# Patient Record
Sex: FEMALE | Race: WHITE | NOT HISPANIC OR LATINO | Employment: UNEMPLOYED | URBAN - METROPOLITAN AREA
[De-identification: names, ages, dates, MRNs, and addresses within clinical notes are randomized per-mention and may not be internally consistent; named-entity substitution may affect disease eponyms.]

---

## 2017-01-18 ENCOUNTER — LAB VISIT (OUTPATIENT)
Dept: LAB | Facility: HOSPITAL | Age: 32
End: 2017-01-18
Attending: INTERNAL MEDICINE
Payer: COMMERCIAL

## 2017-01-18 ENCOUNTER — OFFICE VISIT (OUTPATIENT)
Dept: INTERNAL MEDICINE | Facility: CLINIC | Age: 32
End: 2017-01-18
Payer: COMMERCIAL

## 2017-01-18 VITALS
SYSTOLIC BLOOD PRESSURE: 124 MMHG | OXYGEN SATURATION: 97 % | HEIGHT: 65 IN | BODY MASS INDEX: 41.22 KG/M2 | HEART RATE: 70 BPM | WEIGHT: 247.38 LBS | DIASTOLIC BLOOD PRESSURE: 78 MMHG

## 2017-01-18 DIAGNOSIS — F41.0 PANIC ATTACKS: Primary | ICD-10-CM

## 2017-01-18 DIAGNOSIS — R94.6 THYROID FUNCTION STUDY ABNORMALITY: ICD-10-CM

## 2017-01-18 DIAGNOSIS — F41.0 PANIC ATTACKS: ICD-10-CM

## 2017-01-18 DIAGNOSIS — O21.0: ICD-10-CM

## 2017-01-18 DIAGNOSIS — R73.03 BORDERLINE DIABETES: ICD-10-CM

## 2017-01-18 DIAGNOSIS — R63.2 BINGE EATING: ICD-10-CM

## 2017-01-18 LAB
T3 SERPL-MCNC: 108 NG/DL
T4 FREE SERPL-MCNC: 1.2 NG/DL
TSH SERPL DL<=0.005 MIU/L-ACNC: 0.48 UIU/ML

## 2017-01-18 PROCEDURE — 84480 ASSAY TRIIODOTHYRONINE (T3): CPT

## 2017-01-18 PROCEDURE — 84443 ASSAY THYROID STIM HORMONE: CPT

## 2017-01-18 PROCEDURE — 36415 COLL VENOUS BLD VENIPUNCTURE: CPT | Mod: PO

## 2017-01-18 PROCEDURE — 84439 ASSAY OF FREE THYROXINE: CPT

## 2017-01-18 PROCEDURE — 1159F MED LIST DOCD IN RCRD: CPT | Mod: S$GLB,,, | Performed by: INTERNAL MEDICINE

## 2017-01-18 PROCEDURE — 99203 OFFICE O/P NEW LOW 30 MIN: CPT | Mod: S$GLB,,, | Performed by: INTERNAL MEDICINE

## 2017-01-18 PROCEDURE — 99999 PR PBB SHADOW E&M-EST. PATIENT-LVL III: CPT | Mod: PBBFAC,,, | Performed by: INTERNAL MEDICINE

## 2017-01-18 RX ORDER — LORAZEPAM 1 MG/1
1 TABLET ORAL EVERY 12 HOURS PRN
Qty: 30 TABLET | Refills: 0 | Status: SHIPPED | OUTPATIENT
Start: 2017-01-18 | End: 2017-12-19 | Stop reason: ALTCHOICE

## 2017-01-19 NOTE — PROGRESS NOTES
Portions of this note are generated with voice recognition software. Typographical errors may exist.       Patient Name:MACHO CARR  Patient MRN:   [id number]    History of Present Illness   ================================================================  MACHO CARR is a 31 y.o. female here for primary care visit for  Chief Complaint   Patient presents with    Panic Attack       Past Medical History   Diagnosis Date    Hypertension      with pregnancy       Past Surgical History   Procedure Laterality Date    Cholecystectomy         Review of patient's allergies indicates:   Allergen Reactions    Ciprofloxacin Nausea And Vomiting    Sulfa (sulfonamide antibiotics) Hives       Current Outpatient Prescriptions on File Prior to Visit   Medication Sig Dispense Refill    cetirizine (ZYRTEC) 5 MG tablet Take 1 tablet (5 mg total) by mouth once daily. 30 tablet 3    fluticasone (FLONASE) 50 mcg/actuation nasal spray USE 2 SPRAY EACH NOSTRIL QD  0    [DISCONTINUED] lorazepam (ATIVAN) 2 MG Tab TK 1 T PO BID TO TID PRF ANXIETY  1     No current facility-administered medications on file prior to visit.        No family history on file.    Social History     Social History    Marital status:      Spouse name: N/A    Number of children: N/A    Years of education: N/A     Occupational History    Not on file.     Social History Main Topics    Smoking status: Never Smoker    Smokeless tobacco: Not on file    Alcohol use No    Drug use: No    Sexual activity: Yes     Birth control/ protection: None     Other Topics Concern    Not on file     Social History Narrative    Dr. Montserrat Anderson Germain gynecology [facility]       History   Sexual Activity    Sexual activity: Yes    Birth control/ protection: None         SUBJECTIVE:    Panic attacks.  Patient states that for the last several weeks she has been having frequent severe panic attacks.  States that she has had multiple psychosocial stressors in the  "last several months.  States that she had high risk pregnancy resulting in hyperemesis gravidarum.  In addition her boyfriend has type 1 diabetes mellitus and has deteriorating health.  She also had the death of her mother at the time of her delivery of her current sun.  This was 3 months ago.  States that she had other family members recently passed away in the last 6 months.  She is grieving and she is having overwhelming anxiety.  States that she was prescribed alprazolam in October immediately following the death of her mother.  States she has completed this medication.  She was given 2 mg alprazolam tablets.  States that this did not cause excessive sedation.  She denies any history of prescription drug abuse or alcohol and drug abuse.  Patient denies suicidal ideation.    Hyperemesis gravidarum.  Patient states that towards the end of her pregnancy she was on multiple anti-medics to simply control her symptoms.  She [facility].  States that since the pregnancy the nausea and vomiting has improved however she still has problems that she describes as "binge eating like him still pregnant."  Unclear if the patient has an underlying eating disorder.  Patient states that she still feels that she needs support for her eating habits and gastrointestinal symptoms.  States she still has Zofran and other anti-medics from the pregnancy.  She has not talk to her gynecologist about her lingering symptoms.    Gestational diabetes.  Patient was told that during her pregnancy she had developed elevated glucose.  States that she has nobody monitoring after her pregnancy.    Thyroid abnormalities.  Patient states that she was unaware of her most recent thyroid blood work.    Medications Reviewed and Updated    Past medical, family, and social histories were reviewed and updated.    Review of Systems negative unless otherwise noted in history of present " illness-   General ROS: negative  Psychological ROS: negative  ENT ROS: negative  Allergy and Immunology ROS: negative  Cardiovascular ROS: negative  Gastrointestinal ROS: negative  Genito-Urinary ROS: negative  Musculoskeletal ROS: negative  Neurological ROS: negative  Dermatological ROS: negative    Allergic:    Review of patient's allergies indicates:   Allergen Reactions    Ciprofloxacin Nausea And Vomiting    Sulfa (sulfonamide antibiotics) Hives       OBJECTIVE:  BP: 124/78 Pulse: 70    Wt Readings from Last 3 Encounters:   01/18/17 112.2 kg (247 lb 5.7 oz)   12/16/16 111.6 kg (246 lb 0.5 oz)   09/30/15 113.4 kg (250 lb)    Body mass index is 41.16 kg/(m^2).  Previous Blood Pressure Readings :   BP Readings from Last 3 Encounters:   01/18/17 124/78   12/16/16 114/74   09/30/15 135/83     GEN: healthy appearing  HEENT: sclera non-icteric, conjunctiva clear  CV: no peripheral edema regular rate and rhythm no significant murmurs.  PULM: breathing non-labored  ABD: obese no epigastric tenderness.  PSYCH: appropriate affect  MSK: able to rise from chair without assistance  SKIN: normal skin turgor      Pertinent Labs Reviewed       ASSESSMENT/PLAN:    Panic attacks.  Not optimally controlled.  Patient at risk for hospitalization.  Recommend stabilization with benzodiazepine therapy.  Risks benefits and alternatives discussed. educated patient on common and serious side effects of this medication. educated patient if any signs or symptoms of serious medication side effects develop to stop taking it immediately and call the clinic for further medical instructions.  Recommend patient return to clinic for close follow-up.  Recommend patient enroll in grief counseling program near her home.  States that there is a [employer] enrolling grief counseling course.    Nausea and vomiting.  Not optimally controlled.  Etiology likely multifactorial.  Resolving hyperemesis gravidarum.  Cannot rule out binge eating disorder.  Plan  to continue following clinically.  In the meantime recommend patient utilize anti-medic medication utilize during pregnancy.     Gestational diabetes.  Clinical follow-up warranted.  Overt diabetes not apparent on recent blood work.  Plan continue monitoring.    Thyroid abnormalities on blood work.  Clinical follow-up warranted.  Plan to repeat TFTs at upcoming appointment.    Future Appointments  Date Time Provider Department Center   2/7/2017 2:00 PM Vikash Dyer MD [facility]       Vikash Dyer  1/18/2017  6:02 PM

## 2017-01-22 PROBLEM — R63.2 BINGE EATING: Status: ACTIVE | Noted: 2017-01-22

## 2017-01-22 PROBLEM — O21.0: Status: ACTIVE | Noted: 2017-01-22

## 2017-01-22 PROBLEM — F41.0 PANIC ATTACKS: Status: ACTIVE | Noted: 2017-01-22

## 2017-01-23 ENCOUNTER — DOCUMENTATION ONLY (OUTPATIENT)
Dept: ADMINISTRATIVE | Facility: OTHER | Age: 32
End: 2017-01-23

## 2017-01-23 NOTE — PROGRESS NOTES
Please note the following patients information has been forwarded to [facility] for Case Management or .    Please see the media section in patient's chart for additional details.    Please contact Outpatient Complex care Management at ext [phone] with any questions.    Thank you,    Violette Sibley, SSC

## 2017-02-01 ENCOUNTER — PATIENT MESSAGE (OUTPATIENT)
Dept: INTERNAL MEDICINE | Facility: CLINIC | Age: 32
End: 2017-02-01

## 2017-02-01 DIAGNOSIS — B00.1 HERPES SIMPLEX LABIALIS: Primary | ICD-10-CM

## 2017-02-01 RX ORDER — ACYCLOVIR 200 MG/1
400 CAPSULE ORAL 3 TIMES DAILY
Qty: 30 CAPSULE | Refills: 0 | Status: SHIPPED | OUTPATIENT
Start: 2017-02-01 | End: 2017-06-07

## 2017-02-07 ENCOUNTER — OFFICE VISIT (OUTPATIENT)
Dept: INTERNAL MEDICINE | Facility: CLINIC | Age: 32
End: 2017-02-07
Payer: COMMERCIAL

## 2017-02-07 VITALS
HEIGHT: 66 IN | DIASTOLIC BLOOD PRESSURE: 76 MMHG | SYSTOLIC BLOOD PRESSURE: 110 MMHG | TEMPERATURE: 98 F | WEIGHT: 249.81 LBS | HEART RATE: 83 BPM | OXYGEN SATURATION: 98 % | BODY MASS INDEX: 40.15 KG/M2

## 2017-02-07 DIAGNOSIS — F41.0 PANIC ATTACKS: Primary | ICD-10-CM

## 2017-02-07 DIAGNOSIS — E16.2 HYPOGLYCEMIA: ICD-10-CM

## 2017-02-07 DIAGNOSIS — F43.29 POST-TRAUMA RESPONSE: ICD-10-CM

## 2017-02-07 DIAGNOSIS — R63.2 BINGE EATING: ICD-10-CM

## 2017-02-07 PROCEDURE — 99999 PR PBB SHADOW E&M-EST. PATIENT-LVL IV: CPT | Mod: PBBFAC,,, | Performed by: INTERNAL MEDICINE

## 2017-02-07 PROCEDURE — 99214 OFFICE O/P EST MOD 30 MIN: CPT | Mod: S$GLB,,, | Performed by: INTERNAL MEDICINE

## 2017-02-07 NOTE — PATIENT INSTRUCTIONS
Recommendations for today  · Continue checking blood sugar every time you have symptoms compatible with low sugar.  · Document sugar numbers and come prepared with sugar numbers when you arrive to your endocrine appointment.    Hypoglycemia (Low Blood Sugar)     Fast-acting sugar includes a cup of nonfat milk.     Too little sugar (glucose) in your blood is called hypoglycemia or low blood sugar. Low blood sugar usually means anything lower than 70 mg/dL. Talk with your healthcare provider about your target range and what level is too low for you. Diabetes itself doesnt cause low blood sugar. But some of the treatments for diabetes, such as pills or insulin, may raise your risk for it. Low blood sugar may cause you to pass out or have a seizure. So always treat low blood sugar right away, but don't overeat.  Special note: Always carry a source of fast-acting sugar and a snack in case of hypoglycemia.   What you may notice  If you have low blood sugar, you may have one or more of these symptoms:  · Shakiness or dizziness  · Cold, clammy skin or sweating  · Feelings of hunger  · Headache  · Nervousness  · A hard, fast heartbeat  · Weakness  · Confusion or irritability  · Blurred vision  · Having nightmares or waking up confused or sweating  · Numbness or tingling in the lips or tongue  What you should do  Here are tips to follow if you have hypoglycemia:   · First check your blood sugar. If it is too low (out of your target range), eat or drink 15 to 20 grams of fast-acting sugar. This may be 3 to 4 glucose tablets, 4 ounces (half a cup) of fruit juice or regular (nondiet) soda, 8 ounces (1 cup) of fat-free milk, or 1 tablespoon of honey. Dont take more than this, or your blood sugar may go too high.  · Wait 15 minutes. Then recheck your blood sugar if you can.  · If your blood sugar is still too low, repeat the steps above and check your blood sugar again. If your blood sugar still has not returned to your target  range, contact your healthcare provider or seek emergency care.  · Once your blood sugar returns to target range, eat a snack or meal.  Preventing low blood sugar  Things you can do include the following:   · If your condition needs a strict treatment plan, eat your meals and snacks at the same times each day. Dont skip meals!  · If your treatment plan lets you change when you eat and what you eat, learn how to change the time and dose of your rapid-acting insulin to match this.   · Ask your healthcare provider if it is safe for you to drink alcohol. Never drink on an empty stomach.  · Take your medicine at the prescribed times.  · Always carry a source of fast-acting sugar and a snack when youre away from home.  Other things to do  Additional tips include the following:  · Carry a medical ID card, a compact USB drive, or wear a medical alert bracelet or necklace. It should say that you have diabetes. It should also say what to do if you pass out or have a seizure.  · Make sure your family, friends, and coworkers know the signs of low blood sugar. Tell them what to do if your blood sugar falls very low and you cant treat yourself.  · Keep a glucagon emergency kit handy. Be sure your family, friends, and coworkers know how and when to use it. Check it regularly and replace the glucagon before it expires.  · Talk with your health care team about other things you can do to prevent low blood sugar.     If you have unexplained hypoglycemia or hypoglycemia several times, call your healthcare provider.   Date Last Reviewed: 5/1/2016 © 2000-2016 [employer], [facility]. [address] [place] [address], [place] [address]. All rights reserved. This information is not intended as a substitute for professional medical care. Always follow your healthcare professional's instructions.          Anxiety Reaction  Anxiety is the feeling we all get when we think something bad might happen. It is a normal response to stress and usually  causes only a mild reaction. When anxiety becomes more severe, it can interfere with daily life. In some cases, you may not even be aware of what it is youre anxious about. There may also be a genetic link or it may be a learned behavior in the home.  Both psychological and physical triggers cause stress reaction. It's often a response to fear or emotional stress, real or imagined. This stress may come from home, family, work, or social relationships.  During an anxiety reaction, you may feel:  · Helpless  · Nervous  · Depressed  · Irritable  Your body may show signs of anxiety in many ways. You may experience:  · Dry mouth  · Shakiness  · Dizziness  · Weakness  · Trouble breathing  · Breathing fast (hyperventilating)  · Chest pressure  · Sweating  · Headache  · Nausea  · Diarrhea  · Tiredness  · Inability to sleep  · Sexual problems  Home care  · Try to locate the sources of stress in your life. They may not be obvious. These may include:  ¨ Daily hassles of life (traffic jams, missed appointments, car troubles, etc.)  ¨ Major life changes, both good (new baby, job promotion) and bad (loss of job, loss of loved one)  ¨ Overload: feeling that you have too many responsibilities and can't take care of all of them at once  ¨ Feeling helpless, feeling that your problems are beyond what youre able to solve  · Notice how your body reacts to stress. Learn to listen to your body signals. This will help you take action before the stress becomes severe.  · When you can, do something about the source of your stress. (Avoid hassles, limit the amount of change that happens in your life at one time and take a break when you feel overloaded).  · Unfortunately, many stressful situations can't be avoided. It is necessary to learn how to better manage stress. There are many proven methods that will reduce your anxiety. These include simple things like exercise, good nutrition and adequate rest. Also, there are certain techniques  that are helpful:  ¨ Relaxation  ¨ Breathing exercises  ¨ Visualization  ¨ Biofeedback  ¨ Meditation  For more information about this, consult your doctor or go to a local bookstore and review the many books and tapes available on this subject.  Follow-up care  If you feel that your anxiety is not responding to self-help measures, contact your doctor or make an appointment with a counselor. You may need short-term psychological counseling and temporary medicine to help you manage stress.  Call 911  Call your healthcare provider right away if any of these occur:  · Trouble breathing  · Confusion  · Drowsiness or trouble wakening  · Fainting or loss of consciousness  · Rapid heart rate  · Seizure  · New chest pain that becomes more severe, lasts longer, or spreads into your shoulder, arm, neck, jaw, or back  When to seek medical advice  Call your healthcare provider right away if any of these occur:  · Your symptoms get worse  · Severe headache not relieved by rest and mild pain reliever  Date Last Reviewed: 9/29/2015  © 9309-4788 [employer], [employer]. [address] [place] [address], [place] [address]. All rights reserved. This information is not intended as a substitute for professional medical care. Always follow your healthcare professional's instructions.

## 2017-02-07 NOTE — MR AVS SNAPSHOT
[facility] Internal Medicine   [address]  [place] [address]  Phone: [phone]  Fax: [phone]                  Anette Villa   2017 2:00 PM   Office Visit    Description:  Female : 1985   Provider:  Vikash Dyer MD   Department:  [facility]           Reason for Visit     Follow-up           Diagnoses this Visit        Comments    Hypoglycemia    -  Primary     Post-trauma response         Binge eating                To Do List           Future Appointments        Provider Department Dept Phone    3/7/2017 1:40 PM Vikash Dyer MD [facility] Medicine [phone]      Goals (5 Years of Data)     None      [facility]     [facility] On Call Nurse Care Line -  Assistance  Registered nurses in the [facility] On Call Center provide clinical advisement, health education, appointment booking, and other advisory services.  Call for this free service at [phone].             Medications           Message regarding Medications     Verify the changes and/or additions to your medication regime listed below are the same as discussed with your clinician today.  If any of these changes or additions are incorrect, please notify your healthcare provider.             Verify that the below list of medications is an accurate representation of the medications you are currently taking.  If none reported, the list may be blank. If incorrect, please contact your healthcare provider. Carry this list with you in case of emergency.           Current Medications     acyclovir (ZOVIRAX) 200 MG capsule Take 2 capsules (400 mg total) by mouth 3 (three) times daily.    cetirizine (ZYRTEC) 5 MG tablet Take 1 tablet (5 mg total) by mouth once daily.    fluticasone (FLONASE) 50 mcg/actuation nasal spray USE 2 SPRAY EACH NOSTRIL QD    lorazepam (ATIVAN) 1 MG tablet Take 1 tablet (1 mg total) by mouth every 12 (twelve) hours as needed (panic attacks).          "  Clinical Reference Information           Your Vitals Were     BP Pulse Temp Height Weight SpO2    110/76 (BP Location: Right arm, Patient Position: Sitting, BP Method: Manual) 83 98.4 °F (36.9 °C) (Oral) 5' 6" (1.676 m) 113.3 kg (249 lb 12.5 oz) 98%    BMI                40.32 kg/m2          Blood Pressure          Most Recent Value    BP  110/76      Allergies as of 2/7/2017     Ciprofloxacin    Sulfa (Sulfonamide Antibiotics)      Immunizations Administered on Date of Encounter - 2/7/2017     None      Orders Placed During Today's Visit      Normal Orders This Visit    Ambulatory Referral to Endocrinology       Instructions      Recommendations for today  · Continue checking blood sugar every time you have symptoms compatible with low sugar.  · Document sugar numbers and come prepared with sugar numbers when you arrive to your endocrine appointment.    Hypoglycemia (Low Blood Sugar)     Fast-acting sugar includes a cup of nonfat milk.     Too little sugar (glucose) in your blood is called hypoglycemia or low blood sugar. Low blood sugar usually means anything lower than 70 mg/dL. Talk with your healthcare provider about your target range and what level is too low for you. Diabetes itself doesnt cause low blood sugar. But some of the treatments for diabetes, such as pills or insulin, may raise your risk for it. Low blood sugar may cause you to pass out or have a seizure. So always treat low blood sugar right away, but don't overeat.  Special note: Always carry a source of fast-acting sugar and a snack in case of hypoglycemia.   What you may notice  If you have low blood sugar, you may have one or more of these symptoms:  · Shakiness or dizziness  · Cold, clammy skin or sweating  · Feelings of hunger  · Headache  · Nervousness  · A hard, fast heartbeat  · Weakness  · Confusion or irritability  · Blurred vision  · Having nightmares or waking up confused or sweating  · Numbness or tingling in the lips or " tongue  What you should do  Here are tips to follow if you have hypoglycemia:   · First check your blood sugar. If it is too low (out of your target range), eat or drink 15 to 20 grams of fast-acting sugar. This may be 3 to 4 glucose tablets, 4 ounces (half a cup) of fruit juice or regular (nondiet) soda, 8 ounces (1 cup) of fat-free milk, or 1 tablespoon of honey. Dont take more than this, or your blood sugar may go too high.  · Wait 15 minutes. Then recheck your blood sugar if you can.  · If your blood sugar is still too low, repeat the steps above and check your blood sugar again. If your blood sugar still has not returned to your target range, contact your healthcare provider or seek emergency care.  · Once your blood sugar returns to target range, eat a snack or meal.  Preventing low blood sugar  Things you can do include the following:   · If your condition needs a strict treatment plan, eat your meals and snacks at the same times each day. Dont skip meals!  · If your treatment plan lets you change when you eat and what you eat, learn how to change the time and dose of your rapid-acting insulin to match this.   · Ask your healthcare provider if it is safe for you to drink alcohol. Never drink on an empty stomach.  · Take your medicine at the prescribed times.  · Always carry a source of fast-acting sugar and a snack when youre away from home.  Other things to do  Additional tips include the following:  · Carry a medical ID card, a compact USB drive, or wear a medical alert bracelet or necklace. It should say that you have diabetes. It should also say what to do if you pass out or have a seizure.  · Make sure your family, friends, and coworkers know the signs of low blood sugar. Tell them what to do if your blood sugar falls very low and you cant treat yourself.  · Keep a glucagon emergency kit handy. Be sure your family, friends, and coworkers know how and when to use it. Check it regularly and replace the  glucagon before it expires.  · Talk with your health care team about other things you can do to prevent low blood sugar.     If you have unexplained hypoglycemia or hypoglycemia several times, call your healthcare provider.   Date Last Reviewed: 5/1/2016 © 2000-2016 [employer]. [address] [place] [address]. All rights reserved. This information is not intended as a substitute for professional medical care. Always follow your healthcare professional's instructions.          Anxiety Reaction  Anxiety is the feeling we all get when we think something bad might happen. It is a normal response to stress and usually causes only a mild reaction. When anxiety becomes more severe, it can interfere with daily life. In some cases, you may not even be aware of what it is youre anxious about. There may also be a genetic link or it may be a learned behavior in the home.  Both psychological and physical triggers cause stress reaction. It's often a response to fear or emotional stress, real or imagined. This stress may come from home, family, work, or social relationships.  During an anxiety reaction, you may feel:  · Helpless  · Nervous  · Depressed  · Irritable  Your body may show signs of anxiety in many ways. You may experience:  · Dry mouth  · Shakiness  · Dizziness  · Weakness  · Trouble breathing  · Breathing fast (hyperventilating)  · Chest pressure  · Sweating  · Headache  · Nausea  · Diarrhea  · Tiredness  · Inability to sleep  · Sexual problems  Home care  · Try to locate the sources of stress in your life. They may not be obvious. These may include:  ¨ Daily hassles of life (traffic jams, missed appointments, car troubles, etc.)  ¨ Major life changes, both good (new baby, job promotion) and bad (loss of job, loss of loved one)  ¨ Overload: feeling that you have too many responsibilities and can't take care of all of them at once  ¨ Feeling helpless, feeling that your problems are beyond  what youre able to solve  · Notice how your body reacts to stress. Learn to listen to your body signals. This will help you take action before the stress becomes severe.  · When you can, do something about the source of your stress. (Avoid hassles, limit the amount of change that happens in your life at one time and take a break when you feel overloaded).  · Unfortunately, many stressful situations can't be avoided. It is necessary to learn how to better manage stress. There are many proven methods that will reduce your anxiety. These include simple things like exercise, good nutrition and adequate rest. Also, there are certain techniques that are helpful:  ¨ Relaxation  ¨ Breathing exercises  ¨ Visualization  ¨ Biofeedback  ¨ Meditation  For more information about this, consult your doctor or go to a local bookstore and review the many books and tapes available on this subject.  Follow-up care  If you feel that your anxiety is not responding to self-help measures, contact your doctor or make an appointment with a counselor. You may need short-term psychological counseling and temporary medicine to help you manage stress.  Call 911  Call your healthcare provider right away if any of these occur:  · Trouble breathing  · Confusion  · Drowsiness or trouble wakening  · Fainting or loss of consciousness  · Rapid heart rate  · Seizure  · New chest pain that becomes more severe, lasts longer, or spreads into your shoulder, arm, neck, jaw, or back  When to seek medical advice  Call your healthcare provider right away if any of these occur:  · Your symptoms get worse  · Severe headache not relieved by rest and mild pain reliever  Date Last Reviewed: 9/29/2015 © 2000-2016 [employer]. [address] [place] [address], [place] [address]. All rights reserved. This information is not intended as a substitute for professional medical care. Always follow your healthcare professional's instructions.             Language  Assistance Services     ATTENTION: Language assistance services are available, free of charge. Please call [phone].      ATENCIÓN: Si habla adriel, tiene a vigil disposición servicios gratuitos de asistencia lingüística. Llame al [phone].     CHÚ Ý: N?u b?n nói Ti?ng Vi?t, có các d?ch v? h? tr? ngôn ng? mi?n phí dành cho b?n. G?i s? [phone].         [facility] complies with applicable Federal civil rights laws and does not discriminate on the basis of race, color, national origin, age, disability, or sex.

## 2017-02-07 NOTE — PROGRESS NOTES
Portions of this note are generated with voice recognition software. Typographical errors may exist.     SUBJECTIVE:    This is a/an 31 y.o. female here for primary care visit for  Chief Complaint   Patient presents with    Follow-up     panic attacks     Hypoglycemic symptoms.  Patient states approximately 2 weeks ago she was having symptoms of palpitations flushing and lightheadedness.  She thought that this was anxiety but her  recommended that she check her blood glucose.  Reports that glucometer gave result of 56.  States that she had food and symptoms improved.  She denies utilization of hypoglycemic medication.  Patient has not checked her blood glucose previously to know if the symptoms are typically associated with low sugar.    Panic attacks.  Patient states that she has panic symptoms at least once weekly.  Endorses palpitations and feeling overwhelmed.  She has been reluctant to take alprazolam because she is fearful that it will get into the breast milk.  She committed to use alprazolam if she was able to take it at least 2 hours before a breast-feeding session but states that breast-feeding sessions have been so close together that she has not been able to achieve this personal goal.  As a result she has chosen not to pursue alprazolam.    Recurring nausea.  Patient states that nausea is modest and not comparable to her symptoms during pregnancy which resulted in severe vomiting.  She is not taking anti-medic or nausea medication presently.  States that symptoms are cyclical.  States that nausea does correspond with anxiety symptoms.  She is not sure if the anxiety sets off the nausea or if the nausea sets off anticipatory anxiety.  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  States that she was pregnant concurrently.  During 18 month.  She suffered significantly with emesis.  As a result she adapted to this problem by binge eating  whenever she developed a small window of opportunity.      Medications Reviewed and Updated    Past medical, family, and social histories were reviewed and updated.    Review of Systems negative unless noted otherwise in history of present illness-  General ROS: negative  Psychological ROS: negative  Endocrine ROS: negative  Cardiovascular ROS: negative  Gastrointestinal ROS: negative    Allergic:    Review of patient's allergies indicates:   Allergen Reactions    Ciprofloxacin Nausea And Vomiting    Sulfa (sulfonamide antibiotics) Hives       OBJECTIVE:  BP: 110/76 Pulse: 83 Temp: 98.4 °F (36.9 °C)  Wt Readings from Last 3 Encounters:   02/07/17 113.3 kg (249 lb 12.5 oz)   01/18/17 112.2 kg (247 lb 5.7 oz)   12/16/16 111.6 kg (246 lb 0.5 oz)    Body mass index is 40.32 kg/(m^2).  Previous Blood Pressure Readings :   BP Readings from Last 3 Encounters:   02/07/17 110/76   01/18/17 124/78   12/16/16 114/74     GEN: No apparent distress  HEENT: sclera non-icteric, conjunctiva clear  CV: no peripheral edema  PULM: breathing non-labored  ABD: Obese, protuberant abdomen.  PSYCH: appropriate affect  MSK: able to rise from chair without assistance  SKIN: normal skin turgor    Pertinent Labs Reviewed       ASSESSMENT/PLAN:    Hypoglycemic episode.  According to patient report.  Not verified with serum glucose evaluation.  Etiology likely represents measurement error caused by home meter.  However, given her symptoms endocrine evaluation is reasonable.  Schedule with endocrinology exclude rare causes of hypoglycemia in nondiabetic patient.    Panic attacks.  Not optimally controlled.  Patient reluctant to pursue pharmacotherapy in light of breast-feeding.  This is reasonable however I strongly encourage the patient to establish with therapist to address coping strategies.    Recurring nausea and binge eating.  Disordered eating behavior.  Etiology likely multifactorial.  Eating disorder possible.  Compensatory polyphagia  due to hypoglycemia possible but needs to be verified.  Recommend patient receive evaluation by counselor for possible pre-existing eating disorder.       Future Appointments  Date Time Provider Department Center   2/21/2017 2:00 PM Dominga Borrero, PhD [facility] PSYCHOL [facility]   3/7/2017 1:40 PM Vikash Dyer MD [facility]       Vikash Dyer  2/7/2017  2:38 PM

## 2017-02-14 ENCOUNTER — OFFICE VISIT (OUTPATIENT)
Dept: ENDOCRINOLOGY | Facility: CLINIC | Age: 32
End: 2017-02-14
Payer: COMMERCIAL

## 2017-02-14 VITALS
HEIGHT: 66 IN | DIASTOLIC BLOOD PRESSURE: 78 MMHG | SYSTOLIC BLOOD PRESSURE: 116 MMHG | HEART RATE: 88 BPM | BODY MASS INDEX: 39.69 KG/M2 | WEIGHT: 246.94 LBS

## 2017-02-14 DIAGNOSIS — R42 DIZZINESS: Primary | ICD-10-CM

## 2017-02-14 PROCEDURE — 99999 PR PBB SHADOW E&M-EST. PATIENT-LVL III: CPT | Mod: PBBFAC,,, | Performed by: INTERNAL MEDICINE

## 2017-02-14 PROCEDURE — 99203 OFFICE O/P NEW LOW 30 MIN: CPT | Mod: S$GLB,,, | Performed by: INTERNAL MEDICINE

## 2017-02-14 NOTE — PROGRESS NOTES
"Subjective:     Patient ID: Anette Villa is a 31 y.o. female.    Chief Complaint: No chief complaint on file.    HPI:   Ms. Villa is a 31 y.o. female who is here for a consult visit for evaluation hypoglycemia. Reports low blood sugars and symptoms as a teen.  has type 1 DM and she has a family history of diabetes (mother with T2DM).   Reports mom used to check BG and were in the low end.       Last episode was three weeks ago. During pregnancy was getting a few more episodes a month. She has had these episodes for many years, average was six episodes a year.     She describes the most recent episode as: feeling dizzy while out in the yard, just got back from eating out (roast beef with Italian bread). This occurred 45 - 60 minutes after a meal. Then remembers feeling tremulous, hot and sweaty, felt she had tunnel vision and confusion. She checked her BG it was 56.     Ate a "low sugar food" and felt better.     Weight has been about the same for many years.     Medical history:  Hyperemesis with both pregnancy - used reglan with hospital admissions for IVF/dehydration  Sinus     Medications:  Takes zofran infrequently right now, but during pregnancy was using zofran phenergan and reglan (stopped taking these four months ago).   uses insulin on a pump    Social history:  Four months post partum  No other drugs, claritin (very rarely), OTC, prescription or otherwise    Review of Systems   Constitutional: Negative for chills and fever.   HENT: Negative for congestion and sinus pressure.    Eyes: Negative for visual disturbance.   Respiratory: Negative for chest tightness and shortness of breath.    Cardiovascular: Negative for chest pain, palpitations and leg swelling.   Gastrointestinal: Negative for abdominal pain and vomiting.        Stomach bug last week.    Genitourinary: Negative for dysuria.   Musculoskeletal: Negative for arthralgias.   Skin: Negative for rash.   Neurological: Negative for " "weakness.   Hematological: Does not bruise/bleed easily.   Psychiatric/Behavioral: Negative for sleep disturbance.        Objective:     Physical Exam   Constitutional: She is oriented to person, place, and time. She appears well-developed and well-nourished. No distress.   HENT:   Head: Normocephalic and atraumatic.   Nose: Nose normal.   Mouth/Throat: Oropharynx is clear and moist. No oropharyngeal exudate.   Eyes: Conjunctivae and EOM are normal. Pupils are equal, round, and reactive to light. No scleral icterus.   Neck: Normal range of motion. Neck supple. No thyromegaly present.   Cardiovascular: Normal rate, regular rhythm, normal heart sounds and intact distal pulses.    Pulmonary/Chest: Effort normal and breath sounds normal.   Abdominal: Soft. Bowel sounds are normal. She exhibits no distension.   Musculoskeletal: Normal range of motion. She exhibits no edema or tenderness.   Lymphadenopathy:     She has no cervical adenopathy.   Neurological: She is alert and oriented to person, place, and time. She has normal reflexes.   Skin: Skin is warm and dry.   Psychiatric: She has a normal mood and affect.   \  Vitals:    02/14/17 1454   BP: 116/78   Pulse: 88   Weight: 112 kg (246 lb 14.6 oz)   Height: 5' 6" (1.676 m)         Results for MACHO CARR (MRN [id number]) as of 2/14/2017 14:24   Ref. Range 1/18/2017 14:40   TSH Latest Ref Range: 0.400 - 4.000 uIU/mL 0.481   T3, Total Latest Ref Range: 60 - 180 ng/dL 108   Free T4 Latest Ref Range: 0.71 - 1.51 ng/dL 1.20     Results for MACHO CARR (MRN [id number]) as of 2/14/2017 14:24   Ref. Range 12/16/2016 10:05   Hemoglobin A1C Latest Ref Range: 4.5 - 6.2 % 5.0   Estimated Avg Glucose Latest Ref Range: 68 - 131 mg/dL 97     Assessment/Plan:     Ms. Carr is a 31 year old woman with family history of type 2 diabetes and obesity with recent episode of dizziness that was asssociated with a BG of 56 on a home glucometer. This "low Blood sugar symptom" has been " on going for many years and has not progressed.     Low carb, lean protein, vegetables, occasional fruit.   High fiber carbohydrates  Keep a dietary and symptom journal   To send to me in 1 - 2 months.

## 2017-02-14 NOTE — PATIENT INSTRUCTIONS
Low carb, lean protein, vegetables, occasional fruit.   High fiber carbohydrates  Keep a dietary and symptom journal   To send to me in 1 - 2 months.  Weigh yourself once weekly  20 - 30 minutes brisk walking three to four times per week.

## 2017-02-14 NOTE — LETTER
February 15, 2017      Vikash Dyer MD  [address] [place] [address]           [facility] - Endo/Diab/Metab  [address]  [facility] [address]  Phone: [phone]  Fax: [phone]          Patient: Anette Villa   MR Number: [id number]   YOB: 1985   Date of Visit: 2/14/2017       Dear Dr. Vikash Dyer:    Thank you for referring Anette Villa to me for evaluation. Attached you will find relevant portions of my assessment and plan of care.    If you have questions, please do not hesitate to call me. I look forward to following Anette Villa along with you.    Sincerely,    Keeley Estrada MD    Enclosure  CC:  No Recipients    If you would like to receive this communication electronically, please contact [email or web address] or [phone] to request more information on [employer] access.    For providers and/or their staff who would like to refer a patient to [facility], please contact us through our one-stop-shop provider referral line, [facility], at [phone].    If you feel you have received this communication in error or would no longer like to receive these types of communications, please e-mail [email or web address]

## 2017-02-20 ENCOUNTER — PATIENT MESSAGE (OUTPATIENT)
Dept: INTERNAL MEDICINE | Facility: CLINIC | Age: 32
End: 2017-02-20

## 2017-02-21 ENCOUNTER — OFFICE VISIT (OUTPATIENT)
Dept: PSYCHIATRY | Facility: CLINIC | Age: 32
End: 2017-02-21
Payer: COMMERCIAL

## 2017-02-21 DIAGNOSIS — F41.9 ANXIETY: ICD-10-CM

## 2017-02-21 DIAGNOSIS — F41.0 PANIC ATTACKS: Primary | ICD-10-CM

## 2017-02-21 PROCEDURE — 90791 PSYCH DIAGNOSTIC EVALUATION: CPT | Mod: S$GLB,,, | Performed by: PSYCHOLOGIST
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
## 2017-03-07 ENCOUNTER — OFFICE VISIT (OUTPATIENT)
Dept: INTERNAL MEDICINE | Facility: CLINIC | Age: 32
End: 2017-03-07
Payer: COMMERCIAL

## 2017-03-07 VITALS
WEIGHT: 249.56 LBS | DIASTOLIC BLOOD PRESSURE: 78 MMHG | OXYGEN SATURATION: 97 % | HEIGHT: 66 IN | BODY MASS INDEX: 40.11 KG/M2 | HEART RATE: 88 BPM | SYSTOLIC BLOOD PRESSURE: 126 MMHG

## 2017-03-07 DIAGNOSIS — R63.2 BINGE EATING: ICD-10-CM

## 2017-03-07 DIAGNOSIS — F41.0 PANIC ATTACKS: Primary | ICD-10-CM

## 2017-03-07 DIAGNOSIS — J06.9 URTI (ACUTE UPPER RESPIRATORY INFECTION): ICD-10-CM

## 2017-03-07 DIAGNOSIS — J30.9 ALLERGIC RHINITIS, UNSPECIFIED ALLERGIC RHINITIS TRIGGER, UNSPECIFIED RHINITIS SEASONALITY: ICD-10-CM

## 2017-03-07 PROCEDURE — 1160F RVW MEDS BY RX/DR IN RCRD: CPT | Mod: S$GLB,,, | Performed by: INTERNAL MEDICINE

## 2017-03-07 PROCEDURE — 99999 PR PBB SHADOW E&M-EST. PATIENT-LVL III: CPT | Mod: PBBFAC,,, | Performed by: INTERNAL MEDICINE

## 2017-03-07 PROCEDURE — 99214 OFFICE O/P EST MOD 30 MIN: CPT | Mod: S$GLB,,, | Performed by: INTERNAL MEDICINE

## 2017-03-07 RX ORDER — FLUTICASONE PROPIONATE 50 MCG
2 SPRAY, SUSPENSION (ML) NASAL DAILY
Qty: 16 G | Refills: 12 | Status: SHIPPED | OUTPATIENT
Start: 2017-03-07 | End: 2017-04-06

## 2017-03-07 NOTE — PATIENT INSTRUCTIONS
Recommendations for today  · Regarding upper respiratory tract symptoms, there is no clear indication for anti-biotic today.  However the expectation with a upper respiratory viral infection is that it will improve within 7-10 days of the start of symptoms.  To be proactive, contact the clinic first thing Friday morning if your symptoms have worsened.  If they are getting better, simply continue with over-the-counter measures for now.  · To prevent sinusitis from worsening you should start Flonase nasal spray once daily for at least the next 7-14 days.  Make sure to gargle with water and spit it out after using Flonase to avoid irritating the throat due to Flonase.    Anxiety treatment  · It is highly recommended to continue appointments with counselor to reduce anxiety symptoms and to further explore problems with binge eating.  · Only utilize Ativan for severe panic attack symptoms.    Hypoglycemia symptoms  · Maintain a journal of symptoms you suspect might be related to hypoglycemia along with sugar numbers at the time of her symptoms.  If any sugar numbers less than 70 occur at the same time as symptoms please contact the clinic ASAP.

## 2017-03-07 NOTE — MR AVS SNAPSHOT
[facility]   [address]  [place] [address]  Phone: [phone]  Fax: [phone]                  Anette Villa   3/7/2017 1:40 PM   Office Visit    Description:  Female : 1985   Provider:  Vikash Dyer MD   Department:  [facility]           Reason for Visit     Follow-up           Diagnoses this Visit        Comments    Allergic rhinitis, unspecified allergic rhinitis trigger, unspecified rhinitis seasonality    -  Primary            To Do List           Goals (5 Years of Data)     None       These Medications        Disp Refills Start End    fluticasone (FLONASE) 50 mcg/actuation nasal spray 16 g 12 3/7/2017 2017    2 sprays by Each Nare route once daily. - Each Nare    Pharmacy: [facility] [id number]  [place], [place] [facility] [address] AT [facility] #: [phone]         [facility]     [facility] On Call Nurse Care Line -  Assistance  Registered nurses in the [facility] provide clinical advisement, health education, appointment booking, and other advisory services.  Call for this free service at [phone].             Medications           Message regarding Medications     Verify the changes and/or additions to your medication regime listed below are the same as discussed with your clinician today.  If any of these changes or additions are incorrect, please notify your healthcare provider.        START taking these NEW medications        Refills    fluticasone (FLONASE) 50 mcg/actuation nasal spray 12    Si sprays by Each Nare route once daily.    Class: Normal    Route: Each Nare           Verify that the below list of medications is an accurate representation of the medications you are currently taking.  If none reported, the list may be blank. If incorrect, please contact your healthcare provider. Carry this list with you in case of emergency.           Current Medications   "   acyclovir (ZOVIRAX) 200 MG capsule Take 2 capsules (400 mg total) by mouth 3 (three) times daily.    cetirizine (ZYRTEC) 5 MG tablet Take 1 tablet (5 mg total) by mouth once daily.    lorazepam (ATIVAN) 1 MG tablet Take 1 tablet (1 mg total) by mouth every 12 (twelve) hours as needed (panic attacks).    fluticasone (FLONASE) 50 mcg/actuation nasal spray 2 sprays by Each Nare route once daily.           Clinical Reference Information           Your Vitals Were     BP Pulse Height Weight Last Period SpO2    126/78 (BP Location: Left arm, Patient Position: Sitting, BP Method: Manual) 88 5' 6" (1.676 m) 113.2 kg (249 lb 9 oz) 08/01/2014 97%    BMI                40.28 kg/m2          Blood Pressure          Most Recent Value    BP  126/78      Allergies as of 3/7/2017     Ciprofloxacin    Sulfa (Sulfonamide Antibiotics)      Immunizations Administered on Date of Encounter - 3/7/2017     None      Instructions    Recommendations for today  · Regarding upper respiratory tract symptoms, there is no clear indication for anti-biotic today.  However the expectation with a upper respiratory viral infection is that it will improve within 7-10 days of the start of symptoms.  To be proactive, contact the clinic first thing Friday morning if your symptoms have worsened.  If they are getting better, simply continue with over-the-counter measures for now.  · To prevent sinusitis from worsening you should start Flonase nasal spray once daily for at least the next 7-14 days.  Make sure to gargle with water and spit it out after using Flonase to avoid irritating the throat due to Flonase.    Anxiety treatment  · It is highly recommended to continue appointments with counselor to reduce anxiety symptoms and to further explore problems with binge eating.  · Only utilize Ativan for severe panic attack symptoms.    Hypoglycemia symptoms  · Maintain a journal of symptoms you suspect might be related to hypoglycemia along with sugar " numbers at the time of her symptoms.  If any sugar numbers less than 70 occur at the same time as symptoms please contact the clinic ASAP.             Language Assistance Services     ATTENTION: Language assistance services are available, free of charge. Please call [phone].      ATENCIÓN: Si martha morel, tiene a vigil disposición servicios gratuitos de asistencia lingüística. Llame al [phone].     CHÚ Ý: N?u b?n nói Ti?ng Vi?t, có các d?ch v? h? tr? ngôn ng? mi?n phí dành cho b?n. G?i s? [phone].         [facility] complies with applicable Federal civil rights laws and does not discriminate on the basis of race, color, national origin, age, disability, or sex.
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
## 2017-03-09 ENCOUNTER — TELEPHONE (OUTPATIENT)
Dept: PSYCHIATRY | Facility: CLINIC | Age: 32
End: 2017-03-09

## 2017-03-09 NOTE — TELEPHONE ENCOUNTER
Ms. Villa cancelled her appointment with this provider for 03/03/2017.  A voicemail message was left for Ms. Villa with the department of psychiatry appointment line if she would like to reschedule a new appointment.

## 2017-04-11 ENCOUNTER — OFFICE VISIT (OUTPATIENT)
Dept: PSYCHIATRY | Facility: CLINIC | Age: 32
End: 2017-04-11
Payer: COMMERCIAL

## 2017-04-11 DIAGNOSIS — F41.0 PANIC ATTACKS: ICD-10-CM

## 2017-04-11 DIAGNOSIS — F41.9 ANXIETY: Primary | ICD-10-CM

## 2017-04-11 PROCEDURE — 90832 PSYTX W PT 30 MINUTES: CPT | Mod: S$GLB,,, | Performed by: PSYCHOLOGIST

## 2017-04-11 PROCEDURE — 99999 PR PBB SHADOW E&M-EST. PATIENT-LVL I: CPT | Mod: PBBFAC,,, | Performed by: PSYCHOLOGIST

## 2017-04-11 NOTE — PROGRESS NOTES
"Individual Psychotherapy (PhD/LCSW)    4/11/2017    Site:  [facility]         Therapeutic Intervention: Met with patient.  Outpatient - Insight oriented psychotherapy 30 min - CPT code 70293    Chief complaint/reason for encounter: anxiety     Interval history and content of current session:  Ms. Villa arrived on time for her scheduled appointment.  She reports that her symptoms have greatly improved.  She has not experienced a panic attack since our last session on 02/21/2017, and has not used her prescribed Xanax since that time.  She does report anxiety "that comes along with being a new mom," but denied significant symptoms that she previously experienced.  The stressors related to pregnancy and delivery, her 's medical problems, and recent family/friend deaths have subsided.  She did endorse problems with eating that originated during problematic pregnancies, in which she vomited up to 70 times per day and experienced 60-lb. weight loss.  She had small time frames in which she did not feel nauseous and ate large amounts of unhealthy food in order to make up for her weight loss.  These eating patterns persisted for a short time after delivery; however, she has prioritized balanced eating and engages in healthy strategies currently.  Although she only eats two meals per day (due to schedules with her children), she does snack on protein-rich foods and yogurt.  She is working on developing a routine to eat three regular meals per day.  She has been avoiding fast food and unhealthy food and increasing protein and vegetable intake.  She has started meal preparations and planning.  Because she has been managing her symptoms well, a plan was made for her to seek individual psychotherapy PRN in the future.  We discussed relapse prevention.  She generated a list of strategies that have helped her successfully cope with anxiety and panic including self-care, exercise, gardening, spending time with " friends, asking for help, and improved sleep and eating.  She also attended a support group for grief, worked on coping strategies, and reframed negative beliefs/interpretations to be positive.  If she finds herself becoming overwhelmed, out of control, or juggling too many activities and responsibilities, she will ask for help from others, employ coping strategies, and return to treatment.      Treatment plan:  · Target symptoms: anxiety   · Why chosen therapy is appropriate versus another modality: relevant to diagnosis  · Outcome monitoring methods: self-report  · Therapeutic intervention type: insight oriented psychotherapy    Risk parameters:  Patient reports no suicidal ideation  Patient reports no homicidal ideation  Patient reports no self-injurious behavior  Patient reports no violent behavior    Verbal deficits: None    Patient's response to intervention:  The patient's response to intervention is accepting.    Progress toward goals and other mental status changes:  The patient's progress toward goals is good.    Diagnosis:     ICD-10-CM ICD-9-CM   1. Anxiety F41.9 300.00   2. Panic attacks F41.0 300.01       Plan:  individual psychotherapy    Return to clinic: as needed    Length of Service (minutes): 30

## 2017-06-07 ENCOUNTER — LAB VISIT (OUTPATIENT)
Dept: LAB | Facility: HOSPITAL | Age: 32
End: 2017-06-07
Attending: INTERNAL MEDICINE
Payer: COMMERCIAL

## 2017-06-07 ENCOUNTER — OFFICE VISIT (OUTPATIENT)
Dept: INTERNAL MEDICINE | Facility: CLINIC | Age: 32
End: 2017-06-07
Payer: COMMERCIAL

## 2017-06-07 VITALS
HEART RATE: 82 BPM | BODY MASS INDEX: 41.2 KG/M2 | SYSTOLIC BLOOD PRESSURE: 124 MMHG | OXYGEN SATURATION: 96 % | HEIGHT: 66 IN | DIASTOLIC BLOOD PRESSURE: 82 MMHG | WEIGHT: 256.38 LBS

## 2017-06-07 DIAGNOSIS — R74.01 TRANSAMINITIS: Primary | ICD-10-CM

## 2017-06-07 DIAGNOSIS — R11.0 NAUSEA IN ADULT: ICD-10-CM

## 2017-06-07 DIAGNOSIS — Z13.220 ENCOUNTER FOR LIPID SCREENING FOR CARDIOVASCULAR DISEASE: ICD-10-CM

## 2017-06-07 DIAGNOSIS — R74.01 TRANSAMINITIS: ICD-10-CM

## 2017-06-07 DIAGNOSIS — Z13.6 ENCOUNTER FOR LIPID SCREENING FOR CARDIOVASCULAR DISEASE: ICD-10-CM

## 2017-06-07 DIAGNOSIS — D50.8 IRON DEFICIENCY ANEMIA SECONDARY TO INADEQUATE DIETARY IRON INTAKE: ICD-10-CM

## 2017-06-07 DIAGNOSIS — K76.0 NAFLD (NONALCOHOLIC FATTY LIVER DISEASE): ICD-10-CM

## 2017-06-07 LAB
ALBUMIN SERPL BCP-MCNC: 3.9 G/DL
ALP SERPL-CCNC: 108 U/L
ALT SERPL W/O P-5'-P-CCNC: 19 U/L
ANION GAP SERPL CALC-SCNC: 10 MMOL/L
AST SERPL-CCNC: 18 U/L
BASOPHILS # BLD AUTO: 0.03 K/UL
BASOPHILS NFR BLD: 0.4 %
BILIRUB SERPL-MCNC: 0.3 MG/DL
BUN SERPL-MCNC: 18 MG/DL
CALCIUM SERPL-MCNC: 9.4 MG/DL
CHLORIDE SERPL-SCNC: 107 MMOL/L
CO2 SERPL-SCNC: 23 MMOL/L
CREAT SERPL-MCNC: 0.8 MG/DL
DIFFERENTIAL METHOD: NORMAL
EOSINOPHIL # BLD AUTO: 0.3 K/UL
EOSINOPHIL NFR BLD: 4.1 %
ERYTHROCYTE [DISTWIDTH] IN BLOOD BY AUTOMATED COUNT: 14.2 %
EST. GFR  (AFRICAN AMERICAN): >60 ML/MIN/1.73 M^2
EST. GFR  (NON AFRICAN AMERICAN): >60 ML/MIN/1.73 M^2
FERRITIN SERPL-MCNC: 24 NG/ML
GLUCOSE SERPL-MCNC: 82 MG/DL
HCT VFR BLD AUTO: 42.7 %
HDLC SERPL-MCNC: 165 MG/DL
HDLC SERPL-MCNC: 38 MG/DL
HGB BLD-MCNC: 13.8 G/DL
LYMPHOCYTES # BLD AUTO: 2 K/UL
LYMPHOCYTES NFR BLD: 26.5 %
MCH RBC QN AUTO: 27.2 PG
MCHC RBC AUTO-ENTMCNC: 32.3 %
MCV RBC AUTO: 84 FL
MONOCYTES # BLD AUTO: 0.6 K/UL
MONOCYTES NFR BLD: 8 %
NEUTROPHILS # BLD AUTO: 4.5 K/UL
NEUTROPHILS NFR BLD: 60.9 %
PLATELET # BLD AUTO: 191 K/UL
PMV BLD AUTO: 11.7 FL
POTASSIUM SERPL-SCNC: 4.1 MMOL/L
PROT SERPL-MCNC: 7.9 G/DL
RBC # BLD AUTO: 5.08 M/UL
SODIUM SERPL-SCNC: 140 MMOL/L
WBC # BLD AUTO: 7.47 K/UL

## 2017-06-07 PROCEDURE — 80053 COMPREHEN METABOLIC PANEL: CPT

## 2017-06-07 PROCEDURE — 99999 PR PBB SHADOW E&M-EST. PATIENT-LVL III: CPT | Mod: PBBFAC,,, | Performed by: INTERNAL MEDICINE

## 2017-06-07 PROCEDURE — 82728 ASSAY OF FERRITIN: CPT

## 2017-06-07 PROCEDURE — 36415 COLL VENOUS BLD VENIPUNCTURE: CPT | Mod: PO

## 2017-06-07 PROCEDURE — 99214 OFFICE O/P EST MOD 30 MIN: CPT | Mod: S$GLB,,, | Performed by: INTERNAL MEDICINE

## 2017-06-07 PROCEDURE — 83718 ASSAY OF LIPOPROTEIN: CPT

## 2017-06-07 PROCEDURE — 82465 ASSAY BLD/SERUM CHOLESTEROL: CPT

## 2017-06-07 PROCEDURE — 85025 COMPLETE CBC W/AUTO DIFF WBC: CPT

## 2017-06-07 PROCEDURE — 86706 HEP B SURFACE ANTIBODY: CPT

## 2017-06-07 PROCEDURE — 87340 HEPATITIS B SURFACE AG IA: CPT

## 2017-06-07 PROCEDURE — 86803 HEPATITIS C AB TEST: CPT

## 2017-06-07 RX ORDER — FLUTICASONE PROPIONATE 50 MCG
SPRAY, SUSPENSION (ML) NASAL
Refills: 11 | COMMUNITY
Start: 2017-04-07 | End: 2018-08-29 | Stop reason: SDUPTHER

## 2017-06-07 NOTE — PROGRESS NOTES
Portions of this note are generated with voice recognition software. Typographical errors may exist.     SUBJECTIVE:    This is a/an 31 y.o. female here for primary care visit for  Chief Complaint   Patient presents with    Nausea     3 months               Medications Reviewed and Updated    Past medical, family, and social histories were reviewed and updated.    Review of Systems negative unless otherwise noted in history of present illness-   General ROS: negative  Psychological ROS: negative  ENT ROS: negative  Allergy and Immunology ROS: negative  Cardiovascular ROS: negative  Gastrointestinal ROS: negative  Genito-Urinary ROS: negative  Musculoskeletal ROS: negative  Neurological ROS: negative  Dermatological ROS: negative    Allergic:    Review of patient's allergies indicates:   Allergen Reactions    Ciprofloxacin Nausea And Vomiting    Sulfa (sulfonamide antibiotics) Hives       OBJECTIVE:  BP: 124/82 Pulse: 82    Wt Readings from Last 3 Encounters:   06/07/17 116.3 kg (256 lb 6.3 oz)   03/07/17 113.2 kg (249 lb 9 oz)   02/14/17 112 kg (246 lb 14.6 oz)    Body mass index is 41.38 kg/m².  Previous Blood Pressure Readings :   BP Readings from Last 3 Encounters:   06/07/17 124/82   03/07/17 126/78   02/14/17 116/78       GEN: No apparent distress  HEENT: sclera non-icteric, conjunctiva clear  CV: no peripheral edema  PULM: breathing non-labored  ABD: Obese, protuberant abdomen.  Supple abdomen.  No focal tenderness  PSYCH: appropriate affect  MSK: able to rise from chair without assistance  SKIN: normal skin turgor    Pertinent Labs Reviewed       ASSESSMENT/PLAN:    Transaminitis.  Etiology unclear.  Further evaluation warranted  -     Comprehensive metabolic panel; Future; Expected date: 06/07/2017  -     Hepatitis B surface antigen; Future; Expected date: 06/07/2017  -     Hepatitis B surface antibody; Future; Expected date: 06/07/2017  -     Hepatitis C antibody; Future; Expected date: 06/07/2017  -      Cancel: US Abdomen Limited; Future; Expected date: 06/07/2017    Iron deficiency anemia secondary to inadequate dietary iron intake  -     CBC auto differential; Future; Expected date: 06/07/2017  -     Ferritin; Future; Expected date: 06/07/2017     Encounter for lipid screening for cardiovascular disease  -     Cholesterol, total; Future; Expected date: 06/07/2017  -     HDL CHOLESTEROL; Future; Expected date: 06/07/2017    Nausea in adult.  Stable. Plan to follow clinically          Future Appointments  Date Time Provider Department Center   7/12/2017 1:40 PM Vikash Dyer MD [facility]   7/14/2017 4:00 PM Dominga Borrero, PhD [facility] PSYCHOL Gurvinder bashir Dyer  6/11/2017  3:13 PM

## 2017-06-07 NOTE — PATIENT INSTRUCTIONS
Recommendations for today    If his nausea symptoms persist within a 7 day or if you have a vomiting episode you should contact my clinic for additional evaluation.     Avoid any over-the-counter medication that has a liver warning if you plan to take it for several days in a row.        Non-Alcoholic Fatty Liver Disease (NAFLD)  NAFLD is a common disease of the liver. It occurs when there is too much fat in the liver. If NAFLD is severe, it can cause liver damage that appears similar to the damage caused by drinking too much alcohol. However, NAFLD is not caused by drinking alcohol. This sheet tells you more about NAFLD and how it can be managed.    How the Liver Works  The liver is an organ located in the upper right side of the abdomen. It has many important functions. These include:  · Metabolizing proteins, carbohydrates, and fats  · Making a substance called bile that helps break down fats  · Storing and releasing sugar (glucose) into the blood to give the body energy  · Removing toxins from the blood  · Helping with the clotting of blood  Understanding NAFLD  A healthy liver may contain some fat. But if too much fat builds up in the liver, this causes NAFLD. NAFLD can be mild, causing fatty liver. Or it can be more severe and show inflammation, as well as the fat, and cause non-alcoholic steatohepatitis (COLUNGA). COLUNGA can lead to cirrhosis. With fatty liver, the liver simply contains more fat than normal. This extra fat usually causes no damage to the liver. With COLUNGA, the fatty liver becomes inflamed over time. COLUNGA is serious because it can lead to scarring of the liver (fibrosis). Over time, the scarring may lead to cirrhosis, which can eventually cause liver failure or liver cancer.  Causes and Risk Factors of NAFLD  The cause of NAFLD is unknown. But certain risk factors make the problem more likely to occur. These include:  · Obesity  · Prediabetes or diabetes  · High levels of cholesterol and  triglycerides (types of fat found in the blood)  · Exposure to certain medications   Symptoms of NAFLD  Most people with NAFLD have no symptoms. If symptoms do occur, they can include:  · Tiredness  · Weakness  · Weight loss  · Loss of appetite  · Nausea and vomiting  · Abdominal pain and cramping  · Yellowing of the skin and eyes (jaundice); dark urine, or light-colored stools  · Swelling in the abdomen or legs  Diagnosing NAFLD  Your health care provider may suspect you have NAFLD if routine blood tests show elevated levels of liver enzymes. This may mean that a liver problem is possible. One or more imaging tests, such as an ultrasound, CT scan, or MRI scan, may be done. Additional blood tests may be done to look for other causes of liver disease. A liver biopsy may also be done. During this test, a hollow needle is used to remove a tiny amount of tissue from the liver. This tissue is then studied in a lab. This test can detect signs of damage involving liver tissue. It can also help determine the cause of the damage and tell the difference between fatty liver and COLUNGA.  Treating NAFLD  Treatment for NAFLD varies for each person. Your doctor will monitor your health and treat any symptoms or underlying health problems you have. Your doctor will also work with you to control your risk factors so that damage to your liver is less likely. Your plan may include:  · Losing excess weight  · Getting regular exercise  · Controlling diabetes and high cholesterol or triglyceride levels  · Taking medications and vitamins as prescribed by your doctor  · Quitting smoking  · Avoiding drinking alcohol  · Eating a healthy and balanced diet  Living with NAFLD  If NAFLD is caught early, it can be managed with treatment. Your health care provider will discuss further treatment options with you as needed.  Date Last Reviewed: 11/26/2014  © 1499-3700 [employer]. [address] [place] [address], [place] [address]. All rights  reserved. This information is not intended as a substitute for professional medical care. Always follow your healthcare professional's instructions.

## 2017-06-08 LAB
HBV SURFACE AB SER-ACNC: NEGATIVE M[IU]/ML
HBV SURFACE AG SERPL QL IA: NEGATIVE
HCV AB SERPL QL IA: NEGATIVE

## 2017-06-09 ENCOUNTER — TELEPHONE (OUTPATIENT)
Dept: INTERNAL MEDICINE | Facility: CLINIC | Age: 32
End: 2017-06-09

## 2017-06-09 NOTE — TELEPHONE ENCOUNTER
----- Message from Vikash Dyer MD sent at 6/9/2017 10:40 AM CDT -----  Contact patient to cancel liver ultrasound.  Liver blood work has returned to normal.  Therefore there is no clear indication to complete liver ultrasound at this time.

## 2017-07-12 ENCOUNTER — TELEPHONE (OUTPATIENT)
Dept: INTERNAL MEDICINE | Facility: CLINIC | Age: 32
End: 2017-07-12

## 2017-10-09 ENCOUNTER — PATIENT MESSAGE (OUTPATIENT)
Dept: INTERNAL MEDICINE | Facility: CLINIC | Age: 32
End: 2017-10-09

## 2017-10-09 DIAGNOSIS — F41.0 PANIC ATTACKS: ICD-10-CM

## 2017-10-09 RX ORDER — LORAZEPAM 1 MG/1
1 TABLET ORAL EVERY 12 HOURS PRN
Qty: 30 TABLET | Refills: 0 | OUTPATIENT
Start: 2017-10-09 | End: 2017-10-24

## 2017-10-12 ENCOUNTER — OFFICE VISIT (OUTPATIENT)
Dept: INTERNAL MEDICINE | Facility: CLINIC | Age: 32
End: 2017-10-12
Payer: COMMERCIAL

## 2017-10-12 VITALS
SYSTOLIC BLOOD PRESSURE: 126 MMHG | BODY MASS INDEX: 42.48 KG/M2 | DIASTOLIC BLOOD PRESSURE: 88 MMHG | WEIGHT: 264.31 LBS | HEIGHT: 66 IN | HEART RATE: 84 BPM | OXYGEN SATURATION: 96 %

## 2017-10-12 DIAGNOSIS — E66.01 MORBID OBESITY WITH BMI OF 40.0-44.9, ADULT: ICD-10-CM

## 2017-10-12 DIAGNOSIS — F41.9 ANXIETY: ICD-10-CM

## 2017-10-12 DIAGNOSIS — Z63.4 BEREAVEMENT: ICD-10-CM

## 2017-10-12 DIAGNOSIS — F32.A DEPRESSION, UNSPECIFIED DEPRESSION TYPE: Primary | ICD-10-CM

## 2017-10-12 PROCEDURE — 99999 PR PBB SHADOW E&M-EST. PATIENT-LVL III: CPT | Mod: PBBFAC,,, | Performed by: INTERNAL MEDICINE

## 2017-10-12 PROCEDURE — 99213 OFFICE O/P EST LOW 20 MIN: CPT | Mod: S$GLB,,, | Performed by: INTERNAL MEDICINE

## 2017-10-12 RX ORDER — SERTRALINE HYDROCHLORIDE 50 MG/1
TABLET, FILM COATED ORAL
Qty: 180 TABLET | Refills: 0 | Status: SHIPPED | OUTPATIENT
Start: 2017-10-12 | End: 2017-12-19 | Stop reason: SDUPTHER

## 2017-10-12 SDOH — SOCIAL DETERMINANTS OF HEALTH (SDOH): DISSAPEARANCE AND DEATH OF FAMILY MEMBER: Z63.4

## 2017-10-12 NOTE — PROGRESS NOTES
Portions of this note are generated with voice recognition software. Typographical errors may exist.     SUBJECTIVE:    This is a/an 32 y.o. female here for primary care visit for  Chief Complaint   Patient presents with    Medication Refill       Continues to have significant depressive and anxiety symptoms.  Denies severe symptoms such as suicidality.  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  She understands the benefits risks and alternatives to SSRI therapy and chooses to pursue this therapy at this time.    States that depressive symptoms have resulted in overeating and stress eating.  She denies substance misuse or abuse    Answers for HPI/ROS submitted by the patient on 10/12/2017   activity change: No  unexpected weight change: No  rhinorrhea: No  trouble swallowing: No  visual disturbance: No  chest tightness: No  polyuria: No  difficulty urinating: No  menstrual problem: No  joint swelling: No  arthralgias: No  confusion: No  dysphoric mood: No      Medications Reviewed and Updated    Past medical, family, and social histories were reviewed and updated.    Review of Systems negative unless otherwise noted in history of present illness-  Review of Systems   HENT: Negative for hearing loss.    Eyes: Negative for discharge.   Respiratory: Negative for wheezing.    Cardiovascular: Negative for chest pain and palpitations.   Gastrointestinal: Negative for blood in stool, constipation, diarrhea and vomiting.   Genitourinary: Negative for dysuria and hematuria.   Musculoskeletal: Negative for neck pain.   Neurological: Negative for weakness and headaches.   Endo/Heme/Allergies: Negative for polydipsia.           Allergic:    Review of patient's allergies indicates:   Allergen Reactions    Ciprofloxacin Nausea And Vomiting    Sulfa (sulfonamide antibiotics) Hives        OBJECTIVE:  BP: 126/88 Pulse: 84    Wt Readings from Last 3 Encounters:   10/12/17 119.9 kg (264 lb 5.3 oz)   06/07/17 116.3 kg (256 lb 6.3 oz)   03/07/17 113.2 kg (249 lb 9 oz)    Body mass index is 42.66 kg/m².  Previous Blood Pressure Readings :   BP Readings from Last 3 Encounters:   10/12/17 126/88   06/07/17 124/82   03/07/17 126/78       Physical Exam    GEN: No apparent distress  HEENT: sclera non-icteric, conjunctiva clear  CV: no peripheral edema  PULM: breathing non-labored  ABD: Obese, protuberant abdomen.  PSYCH: appropriate affect  MSK: able to rise from chair without assistance  SKIN: normal skin turgor    Pertinent Labs Reviewed       ASSESSMENT/PLAN:    Depression, unspecified depression type.Condition not optimally controlled. Detailed counseling on self care measures. Plan to monitor clinically in addition to plan below.   -     sertraline (ZOLOFT) 50 MG tablet; Take 1 tab daily in AM. After 2 weeks take 2 tabs daily in AM  Dispense: 180 tablet; Refill: 0    Anxiety.Condition not optimally controlled. Detailed counseling on self care measures. Plan to monitor clinically in addition to plan below.   -     sertraline (ZOLOFT) 50 MG tablet; Take 1 tab daily in AM. After 2 weeks take 2 tabs daily in AM  Dispense: 180 tablet; Refill: 0    Bereavement.Condition not optimally controlled. Detailed counseling on self care measures. Plan to monitor clinically in addition to plan below.   - as above     Morbid obesity with BMI of 40.0-44.9, adult.Condition not optimally controlled.  Patient declines definitive treatment plan.  Plan to readdress at every clinic visit.      No future appointments.    Vikash Dyer  10/12/2017  9:15 AM

## 2017-10-12 NOTE — PATIENT INSTRUCTIONS
AFTER CARE INSTRUCTIONS   · The name of your medicine is Zoloft (Sertraline) . Take it morning to start out.     · You do not become addicted to this medicine.  However your body does become dependent on it after taking it more than 21 days.  Therefore do not stop taking it abruptly once you have been taking it more than 21 days.  If you need to address discontinuing the medicine call the office so we can give you special instructions.    · You need to be on some form of birth control if you're going to be on this medicine.  This medication may carry risk of adverse effects to an unborn fetus.    · This medicine can cause you to either have more energy or make you sleepy. If it makes you sleepy take it at night. If it makes you have energy take it in the morning    · You will start on a low-dose of this medication.  This is to avoid any unwanted side effects or to let yourbody get use to mild side effects until they go away.     · Depression\anxiety symptoms do not typically get better until you have been on the medication for at least 3-4 weeks.  Therefore we will likely need to communicate with you after 3-4 weeks of therapy to determine if you need increased dosage of medication.    · This medicine may give you some stomach upset . Give this some time. If it is minor it should go away on it's own.     · If it does not go away or it bothers you, please call our office. Do NOT stop taking it all of a sudden. Stopping this medicine suddenly can cause your mood symptoms to come back in an unpleasant way.    · This is not generally a life threatening problem but if your mood symptoms worsen and you have persistent thoughts of hurting yourself or others, please call [phone] TALK or [phone]      Never let this medicine bottle . You don't want to risk losing your supply of medicine.

## 2017-10-19 ENCOUNTER — PATIENT MESSAGE (OUTPATIENT)
Dept: INTERNAL MEDICINE | Facility: CLINIC | Age: 32
End: 2017-10-19

## 2017-12-19 ENCOUNTER — OFFICE VISIT (OUTPATIENT)
Dept: INTERNAL MEDICINE | Facility: CLINIC | Age: 32
End: 2017-12-19
Payer: COMMERCIAL

## 2017-12-19 VITALS
SYSTOLIC BLOOD PRESSURE: 124 MMHG | HEIGHT: 66 IN | BODY MASS INDEX: 42.59 KG/M2 | WEIGHT: 265 LBS | HEART RATE: 74 BPM | DIASTOLIC BLOOD PRESSURE: 86 MMHG | OXYGEN SATURATION: 97 %

## 2017-12-19 DIAGNOSIS — F41.9 ANXIETY: ICD-10-CM

## 2017-12-19 DIAGNOSIS — F32.A DEPRESSION, UNSPECIFIED DEPRESSION TYPE: Primary | ICD-10-CM

## 2017-12-19 PROCEDURE — 99213 OFFICE O/P EST LOW 20 MIN: CPT | Mod: S$GLB,,, | Performed by: INTERNAL MEDICINE

## 2017-12-19 PROCEDURE — 99999 PR PBB SHADOW E&M-EST. PATIENT-LVL III: CPT | Mod: PBBFAC,,, | Performed by: INTERNAL MEDICINE

## 2017-12-19 RX ORDER — SERTRALINE HYDROCHLORIDE 100 MG/1
100 TABLET, FILM COATED ORAL NIGHTLY
Qty: 90 TABLET | Refills: 1 | Status: SHIPPED | OUTPATIENT
Start: 2017-12-19 | End: 2018-06-04 | Stop reason: SDUPTHER

## 2017-12-19 NOTE — PATIENT INSTRUCTIONS
Recommendations for today    Anxiety and depression symptoms have improved.  We recommend continuing Zoloft 100 mg every evening.  New prescription to the pharmacy will be 100 mg tablets instead of 50 mg tablets.  Be careful when picking up the new prescription so that you do not accidentally take 2 tablets of 100 mg Zoloft.    Should anxiety and depression symptoms worsen and failed to improve on their own for more than 7 days we recommend coming back to change treatment plan sooner than scheduled.

## 2017-12-19 NOTE — PROGRESS NOTES
Portions of this note are generated with voice recognition software. Typographical errors may exist.     SUBJECTIVE:    This is a/an 32 y.o. female here for primary care visit for  Chief Complaint   Patient presents with    Anxiety     follow up     When starting medication sertraline\Zoloft patient began taking 50 mg daily at bedtime.  States that she had significant nausea with vomiting but she did not inform Dr. Koehler about this significant adverse reaction.  States that within a matter of days the nausea and vomiting stopped.  Denies any problems with agitation or insomnia taking the medication at nighttime.  Patient having multiple opposite response.  Sedation with the medication.  This is not excessive.  Patient overall very satisfied with reaction to the medicine.  Patient continues to abstain from alcohol beverages.    Psychosocial factors helping him cope with anxiety and depression include healthy distraction.  Patient enrolled in education program which is providing healthy distraction.  States that although studying produces some anticipatory stress, overall this is stress that she values.  She also enjoys having socialization with other adults which was not possible when she was exclusively at home with her young children.          Answers for HPI/ROS submitted by the patient on 12/19/2017   activity change: No  unexpected weight change: No  rhinorrhea: No  trouble swallowing: No  visual disturbance: No  chest tightness: No  polyuria: No  difficulty urinating: No  menstrual problem: No  joint swelling: No  arthralgias: No  confusion: No  dysphoric mood: No      Medications Reviewed and Updated    Past medical, family, and social histories were reviewed and updated.    Review of Systems negative unless otherwise noted in history of present illness-  Review of Systems   HENT: Negative for hearing loss.    Eyes: Negative for discharge.   Respiratory: Negative for wheezing.    Cardiovascular: Negative for  chest pain and palpitations.   Gastrointestinal: Negative for blood in stool, constipation, diarrhea and vomiting.   Genitourinary: Negative for dysuria and hematuria.   Musculoskeletal: Negative for neck pain.   Neurological: Negative for weakness and headaches.   Endo/Heme/Allergies: Negative for polydipsia.           Allergic:    Review of patient's allergies indicates:   Allergen Reactions    Ciprofloxacin Nausea And Vomiting    Sulfa (sulfonamide antibiotics) Hives       OBJECTIVE:  BP: 124/86 Pulse: 74    Wt Readings from Last 3 Encounters:   12/19/17 120.2 kg (264 lb 15.9 oz)   10/12/17 119.9 kg (264 lb 5.3 oz)   06/07/17 116.3 kg (256 lb 6.3 oz)    Body mass index is 42.77 kg/m².  Previous Blood Pressure Readings :   BP Readings from Last 3 Encounters:   12/19/17 124/86   10/12/17 126/88   06/07/17 124/82       Physical Exam    GEN: No apparent distress  HEENT: sclera non-icteric, conjunctiva clear  CV: no peripheral edema  PULM: breathing non-labored  ABD: Obese, protuberant abdomen.  PSYCH: appropriate affect  MSK: able to rise from chair without assistance  SKIN: normal skin turgor    Pertinent Labs Reviewed       ASSESSMENT/PLAN:    Depression, unspecified depression type.Condition stable.  Counseling on self-care measures. Plan to monitor clinically. Continue current medical plan.   -     sertraline (ZOLOFT) 100 MG tablet; Take 1 tablet (100 mg total) by mouth every evening.  Dispense: 90 tablet; Refill: 1    Anxiety.Condition stable.  Counseling on self-care measures. Plan to monitor clinically. Continue current medical plan.   -     sertraline (ZOLOFT) 100 MG tablet; Take 1 tablet (100 mg total) by mouth every evening.  Dispense: 90 tablet; Refill: 1          No future appointments.    Vikash Dyer  12/19/2017  9:06 AM

## 2018-06-04 ENCOUNTER — OFFICE VISIT (OUTPATIENT)
Dept: INTERNAL MEDICINE | Facility: CLINIC | Age: 33
End: 2018-06-04
Payer: COMMERCIAL

## 2018-06-04 ENCOUNTER — LAB VISIT (OUTPATIENT)
Dept: LAB | Facility: HOSPITAL | Age: 33
End: 2018-06-04
Attending: INTERNAL MEDICINE
Payer: COMMERCIAL

## 2018-06-04 VITALS
SYSTOLIC BLOOD PRESSURE: 128 MMHG | DIASTOLIC BLOOD PRESSURE: 80 MMHG | BODY MASS INDEX: 43.05 KG/M2 | HEART RATE: 83 BPM | OXYGEN SATURATION: 97 % | WEIGHT: 267.88 LBS | HEIGHT: 66 IN

## 2018-06-04 DIAGNOSIS — F41.9 ANXIETY: Primary | ICD-10-CM

## 2018-06-04 DIAGNOSIS — Z23 NEED FOR HEPATITIS B VACCINATION: ICD-10-CM

## 2018-06-04 DIAGNOSIS — Z78.9 HEPATITIS VACCINATION STATUS UNKNOWN: ICD-10-CM

## 2018-06-04 DIAGNOSIS — Z11.1 SCREENING-PULMONARY TB: ICD-10-CM

## 2018-06-04 DIAGNOSIS — Z78.9 MEASLES, MUMPS, RUBELLA (MMR) VACCINATION STATUS UNKNOWN: ICD-10-CM

## 2018-06-04 DIAGNOSIS — F32.A DEPRESSION, UNSPECIFIED DEPRESSION TYPE: ICD-10-CM

## 2018-06-04 PROCEDURE — 3008F BODY MASS INDEX DOCD: CPT | Mod: CPTII,S$GLB,, | Performed by: INTERNAL MEDICINE

## 2018-06-04 PROCEDURE — 86580 TB INTRADERMAL TEST: CPT | Mod: S$GLB,,, | Performed by: INTERNAL MEDICINE

## 2018-06-04 PROCEDURE — 99214 OFFICE O/P EST MOD 30 MIN: CPT | Mod: 25,S$GLB,, | Performed by: INTERNAL MEDICINE

## 2018-06-04 PROCEDURE — 90471 IMMUNIZATION ADMIN: CPT | Mod: S$GLB,,, | Performed by: INTERNAL MEDICINE

## 2018-06-04 PROCEDURE — 86787 VARICELLA-ZOSTER ANTIBODY: CPT

## 2018-06-04 PROCEDURE — 3079F DIAST BP 80-89 MM HG: CPT | Mod: CPTII,S$GLB,, | Performed by: INTERNAL MEDICINE

## 2018-06-04 PROCEDURE — 3074F SYST BP LT 130 MM HG: CPT | Mod: CPTII,S$GLB,, | Performed by: INTERNAL MEDICINE

## 2018-06-04 PROCEDURE — 90746 HEPB VACCINE 3 DOSE ADULT IM: CPT | Mod: S$GLB,,, | Performed by: INTERNAL MEDICINE

## 2018-06-04 PROCEDURE — 86765 RUBEOLA ANTIBODY: CPT

## 2018-06-04 PROCEDURE — 36415 COLL VENOUS BLD VENIPUNCTURE: CPT | Mod: PO

## 2018-06-04 PROCEDURE — 99999 PR PBB SHADOW E&M-EST. PATIENT-LVL III: CPT | Mod: PBBFAC,,, | Performed by: INTERNAL MEDICINE

## 2018-06-04 PROCEDURE — 86735 MUMPS ANTIBODY: CPT

## 2018-06-04 PROCEDURE — 86762 RUBELLA ANTIBODY: CPT

## 2018-06-04 RX ORDER — SERTRALINE HYDROCHLORIDE 50 MG/1
50 TABLET, FILM COATED ORAL NIGHTLY
Qty: 90 TABLET | Refills: 1 | Status: SHIPPED | OUTPATIENT
Start: 2018-06-04 | End: 2018-08-07 | Stop reason: SDUPTHER

## 2018-06-04 NOTE — PROGRESS NOTES
Portions of this note are generated with voice recognition software. Typographical errors may exist.     SUBJECTIVE:    This is a/an 32 y.o. female here for primary care visit for  Chief Complaint   Patient presents with    Immunizations    Letter for School/Work     Patient states that mood symptoms have been gradually improving.  Anniversaries for the death of her father have been difficult but overal she feels that she is coping better than last year.  She has had some frustration with the current dosage of sertraline.  When she accidentally fails to take the medicine she has a withdrawal symptoms.      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  She is very pleased about this.    Answers for HPI/ROS submitted by the patient on 6/1/2018   activity change: No  unexpected weight change: No  rhinorrhea: No  trouble swallowing: No  visual disturbance: No  chest tightness: No  polyuria: No  difficulty urinating: No  menstrual problem: No  joint swelling: No  arthralgias: No  confusion: No  dysphoric mood: No      Medications Reviewed and Updated    Past medical, family, and social histories were reviewed and updated.    Review of Systems negative unless otherwise noted in history of present illness-  Review of Systems   HENT: Negative for hearing loss.    Eyes: Negative for discharge.   Respiratory: Negative for wheezing.    Cardiovascular: Negative for chest pain and palpitations.   Gastrointestinal: Negative for blood in stool, constipation, diarrhea and vomiting.   Genitourinary: Negative for dysuria and hematuria.   Musculoskeletal: Negative for neck pain.   Neurological: Negative for weakness and headaches.   Endo/Heme/Allergies: Negative for polydipsia.       Allergic:    Review of patient's allergies indicates:   Allergen Reactions    Ciprofloxacin Nausea And Vomiting    Sulfa (sulfonamide antibiotics) Hives       OBJECTIVE:  BP: 128/80 Pulse: 83    Wt Readings from Last 3 Encounters:    06/04/18 121.5 kg (267 lb 13.7 oz)   12/19/17 120.2 kg (264 lb 15.9 oz)   10/12/17 119.9 kg (264 lb 5.3 oz)    Body mass index is 43.23 kg/m².  Previous Blood Pressure Readings :   BP Readings from Last 3 Encounters:   06/04/18 128/80   12/19/17 124/86   10/12/17 126/88       Physical Exam    GEN: No apparent distress  HEENT: sclera non-icteric, conjunctiva clear  CV: no peripheral edema  PULM: breathing non-labored  ABD: Obese, protuberant abdomen.  PSYCH: appropriate affect  MSK: able to rise from chair without assistance  SKIN: normal skin turgor    Pertinent Labs Reviewed       ASSESSMENT/PLAN:    Anxiety.Condition stable.  Counseling on self-care measures. Plan to monitor clinically. Continue current medical plan.   -     sertraline (ZOLOFT) 50 MG tablet; Take 1 tablet (50 mg total) by mouth every evening.  Dispense: 90 tablet; Refill: 1    Hepatitis vaccination status unknown  -     Hepatitis B Surface Antibody, Qual/Quant; Future; Expected date: 06/04/2018    Depression, unspecified depression type.Condition stable.  Counseling on self-care measures. Plan to monitor clinically. Continue current medical plan.   -     sertraline (ZOLOFT) 50 MG tablet; Take 1 tablet (50 mg total) by mouth every evening.  Dispense: 90 tablet; Refill: 1    Measles, mumps, rubella (MMR) vaccination status unknown  -     Rubella antibody, IgG; Future; Expected date: 06/04/2018  -     Rubeola antibody IgG; Future; Expected date: 06/04/2018  -     Mumps, IgG Screen; Future; Expected date: 06/04/2018  -     Varicella zoster antibody, IgG; Future; Expected date: 06/04/2018    Need for hepatitis B vaccination  -     (In Office Administered) Hepatitis B Vaccine (Adult) (IM)    Screening-pulmonary TB  -     POCT TB Skin Test Read      No future appointments.    Vikash Dyer  6/4/2018  3:47 PM

## 2018-06-05 LAB
RUBV IGG SER-ACNC: 98.7 IU/ML
RUBV IGG SER-IMP: REACTIVE

## 2018-06-06 LAB
MUMPS IGG INTERPRETATION: POSITIVE
MUMPS IGG SCREEN: 1.18 ISR
RUBEOLA IGG ANTIBODY: 2.01 ISR
RUBEOLA INTERPRETATION: POSITIVE
VARICELLA INTERPRETATION: POSITIVE
VARICELLA ZOSTER IGG: 3.95 ISR

## 2018-07-05 ENCOUNTER — LAB VISIT (OUTPATIENT)
Dept: LAB | Facility: HOSPITAL | Age: 33
End: 2018-07-05
Attending: INTERNAL MEDICINE
Payer: COMMERCIAL

## 2018-07-05 DIAGNOSIS — Z78.9 HEPATITIS VACCINATION STATUS UNKNOWN: ICD-10-CM

## 2018-07-05 PROCEDURE — 36415 COLL VENOUS BLD VENIPUNCTURE: CPT | Mod: PO

## 2018-07-05 PROCEDURE — 86706 HEP B SURFACE ANTIBODY: CPT

## 2018-07-07 LAB
HEP. B SURF AB, QUAL: NEGATIVE
HEP. B SURF AB, QUANT.: <3 MIU/ML

## 2018-07-09 ENCOUNTER — TELEPHONE (OUTPATIENT)
Dept: INTERNAL MEDICINE | Facility: CLINIC | Age: 33
End: 2018-07-09

## 2018-07-09 NOTE — TELEPHONE ENCOUNTER
----- Message from Natty Reynolds sent at 7/9/2018  9:26 AM CDT -----  Contact: [phone]/self   Pt its requesting to schedule a hep vaccine . Please advise

## 2018-07-10 ENCOUNTER — CLINICAL SUPPORT (OUTPATIENT)
Dept: FAMILY MEDICINE | Facility: CLINIC | Age: 33
End: 2018-07-10
Payer: COMMERCIAL

## 2018-07-10 DIAGNOSIS — Z23 NEED FOR HEPATITIS B VACCINATION: Primary | ICD-10-CM

## 2018-07-10 PROCEDURE — 90471 IMMUNIZATION ADMIN: CPT | Mod: S$GLB,,, | Performed by: INTERNAL MEDICINE

## 2018-07-10 PROCEDURE — 90746 HEPB VACCINE 3 DOSE ADULT IM: CPT | Mod: S$GLB,,, | Performed by: INTERNAL MEDICINE

## 2018-08-03 ENCOUNTER — TRANSFERRED RECORDS (OUTPATIENT)
Dept: MULTI SPECIALTY CLINIC | Facility: CLINIC | Age: 33
End: 2018-08-03

## 2018-08-03 LAB
HPV ABSTRACT: NORMAL
PAP-ABSTRACT: NORMAL

## 2018-08-07 ENCOUNTER — PATIENT MESSAGE (OUTPATIENT)
Dept: INTERNAL MEDICINE | Facility: CLINIC | Age: 33
End: 2018-08-07

## 2018-08-07 DIAGNOSIS — F32.A DEPRESSION, UNSPECIFIED DEPRESSION TYPE: ICD-10-CM

## 2018-08-07 DIAGNOSIS — F41.9 ANXIETY: ICD-10-CM

## 2018-08-07 RX ORDER — SERTRALINE HYDROCHLORIDE 100 MG/1
100 TABLET, FILM COATED ORAL NIGHTLY
Qty: 90 TABLET | Refills: 0 | Status: SHIPPED | OUTPATIENT
Start: 2018-08-07 | End: 2018-11-06 | Stop reason: SDUPTHER

## 2018-08-29 ENCOUNTER — OFFICE VISIT (OUTPATIENT)
Dept: FAMILY MEDICINE | Facility: CLINIC | Age: 33
End: 2018-08-29
Payer: COMMERCIAL

## 2018-08-29 VITALS
BODY MASS INDEX: 41.52 KG/M2 | SYSTOLIC BLOOD PRESSURE: 120 MMHG | HEART RATE: 79 BPM | TEMPERATURE: 98 F | OXYGEN SATURATION: 99 % | WEIGHT: 258.38 LBS | HEIGHT: 66 IN | DIASTOLIC BLOOD PRESSURE: 76 MMHG

## 2018-08-29 DIAGNOSIS — J01.90 ACUTE BACTERIAL SINUSITIS: Primary | ICD-10-CM

## 2018-08-29 DIAGNOSIS — B96.89 ACUTE BACTERIAL SINUSITIS: Primary | ICD-10-CM

## 2018-08-29 PROCEDURE — 99999 PR PBB SHADOW E&M-EST. PATIENT-LVL III: CPT | Mod: PBBFAC,,, | Performed by: FAMILY MEDICINE

## 2018-08-29 PROCEDURE — 3074F SYST BP LT 130 MM HG: CPT | Mod: CPTII,S$GLB,, | Performed by: FAMILY MEDICINE

## 2018-08-29 PROCEDURE — 99214 OFFICE O/P EST MOD 30 MIN: CPT | Mod: S$GLB,,, | Performed by: FAMILY MEDICINE

## 2018-08-29 PROCEDURE — 3078F DIAST BP <80 MM HG: CPT | Mod: CPTII,S$GLB,, | Performed by: FAMILY MEDICINE

## 2018-08-29 PROCEDURE — 3008F BODY MASS INDEX DOCD: CPT | Mod: CPTII,S$GLB,, | Performed by: FAMILY MEDICINE

## 2018-08-29 RX ORDER — AMOXICILLIN 250 MG/1
250 CAPSULE ORAL EVERY 12 HOURS
Qty: 20 CAPSULE | Refills: 0 | Status: SHIPPED | OUTPATIENT
Start: 2018-08-29 | End: 2018-09-08

## 2018-08-29 RX ORDER — FLUTICASONE PROPIONATE 50 MCG
2 SPRAY, SUSPENSION (ML) NASAL DAILY
Qty: 1 BOTTLE | Refills: 11 | Status: SHIPPED | OUTPATIENT
Start: 2018-08-29 | End: 2018-09-05
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
## 2018-09-04 ENCOUNTER — TELEPHONE (OUTPATIENT)
Dept: INTERNAL MEDICINE | Facility: CLINIC | Age: 33
End: 2018-09-04

## 2018-09-04 ENCOUNTER — PATIENT MESSAGE (OUTPATIENT)
Dept: INTERNAL MEDICINE | Facility: CLINIC | Age: 33
End: 2018-09-04

## 2018-09-04 NOTE — TELEPHONE ENCOUNTER
----- Message from Kristi Mcgregor sent at 9/4/2018  1:01 PM CDT -----  No. [phone]    Patient returned your call.

## 2018-09-04 NOTE — TELEPHONE ENCOUNTER
Spoke with pt to offer a same day appointment. Pt declined. Pt was offered to be scheduled with another provider but pt also declined. Understanding voiced.

## 2018-11-06 DIAGNOSIS — F32.A DEPRESSION, UNSPECIFIED DEPRESSION TYPE: ICD-10-CM

## 2018-11-06 DIAGNOSIS — F41.9 ANXIETY: ICD-10-CM

## 2018-11-06 RX ORDER — SERTRALINE HYDROCHLORIDE 100 MG/1
TABLET, FILM COATED ORAL
Qty: 90 TABLET | Refills: 0 | Status: SHIPPED | OUTPATIENT
Start: 2018-11-06 | End: 2019-02-02 | Stop reason: SDUPTHER

## 2018-12-11 ENCOUNTER — CLINICAL SUPPORT (OUTPATIENT)
Dept: FAMILY MEDICINE | Facility: CLINIC | Age: 33
End: 2018-12-11
Payer: COMMERCIAL

## 2018-12-11 DIAGNOSIS — Z92.29 HAS RECEIVED THIRD DOSE OF HEPATITIS B VACCINE: Primary | ICD-10-CM

## 2018-12-11 PROCEDURE — 90471 IMMUNIZATION ADMIN: CPT | Mod: S$GLB,,, | Performed by: INTERNAL MEDICINE

## 2018-12-11 PROCEDURE — 90746 HEPB VACCINE 3 DOSE ADULT IM: CPT | Mod: S$GLB,,, | Performed by: INTERNAL MEDICINE

## 2019-01-03 DIAGNOSIS — R76.0 ABNORMAL ANTIBODY TITER: Primary | ICD-10-CM

## 2019-01-07 ENCOUNTER — PATIENT MESSAGE (OUTPATIENT)
Dept: INTERNAL MEDICINE | Facility: CLINIC | Age: 34
End: 2019-01-07

## 2019-01-07 DIAGNOSIS — Z78.9 HEPATITIS VACCINATION STATUS UNKNOWN: Primary | ICD-10-CM

## 2019-01-11 ENCOUNTER — PATIENT MESSAGE (OUTPATIENT)
Dept: INTERNAL MEDICINE | Facility: CLINIC | Age: 34
End: 2019-01-11

## 2019-01-19 ENCOUNTER — LAB VISIT (OUTPATIENT)
Dept: LAB | Facility: HOSPITAL | Age: 34
End: 2019-01-19
Attending: INTERNAL MEDICINE
Payer: COMMERCIAL

## 2019-01-19 DIAGNOSIS — Z78.9 HEPATITIS VACCINATION STATUS UNKNOWN: ICD-10-CM

## 2019-01-19 PROCEDURE — 86706 HEP B SURFACE ANTIBODY: CPT

## 2019-01-19 PROCEDURE — 36415 COLL VENOUS BLD VENIPUNCTURE: CPT | Mod: PO

## 2019-01-23 LAB
HBV SURFACE AB SER QL IA: POSITIVE
HBV SURFACE AB SERPL IA-ACNC: 477 MIU/ML

## 2019-02-02 DIAGNOSIS — F32.A DEPRESSION, UNSPECIFIED DEPRESSION TYPE: ICD-10-CM

## 2019-02-02 DIAGNOSIS — F41.9 ANXIETY: ICD-10-CM

## 2019-02-04 RX ORDER — SERTRALINE HYDROCHLORIDE 100 MG/1
TABLET, FILM COATED ORAL
Qty: 90 TABLET | Refills: 0 | Status: SHIPPED | OUTPATIENT
Start: 2019-02-04 | End: 2019-05-21 | Stop reason: SDUPTHER

## 2019-05-21 DIAGNOSIS — F32.A DEPRESSION, UNSPECIFIED DEPRESSION TYPE: ICD-10-CM

## 2019-05-21 DIAGNOSIS — F41.9 ANXIETY: ICD-10-CM

## 2019-05-21 RX ORDER — SERTRALINE HYDROCHLORIDE 100 MG/1
100 TABLET, FILM COATED ORAL DAILY
Qty: 30 TABLET | Refills: 0 | Status: SHIPPED | OUTPATIENT
Start: 2019-05-21 | End: 2023-04-20

## 2019-07-09 PROBLEM — R10.13 ABDOMINAL PAIN, EPIGASTRIC: Status: ACTIVE | Noted: 2019-07-09

## 2019-08-06 ENCOUNTER — OFFICE VISIT (OUTPATIENT)
Dept: INTERNAL MEDICINE | Facility: CLINIC | Age: 34
End: 2019-08-06
Payer: COMMERCIAL

## 2019-08-06 ENCOUNTER — LAB VISIT (OUTPATIENT)
Dept: LAB | Facility: HOSPITAL | Age: 34
End: 2019-08-06
Attending: INTERNAL MEDICINE
Payer: COMMERCIAL

## 2019-08-06 VITALS
DIASTOLIC BLOOD PRESSURE: 80 MMHG | HEART RATE: 82 BPM | BODY MASS INDEX: 47.19 KG/M2 | HEIGHT: 64 IN | OXYGEN SATURATION: 97 % | WEIGHT: 276.44 LBS | SYSTOLIC BLOOD PRESSURE: 128 MMHG

## 2019-08-06 DIAGNOSIS — Z11.4 ENCOUNTER FOR SCREENING FOR HIV: ICD-10-CM

## 2019-08-06 DIAGNOSIS — Z11.1 VISIT FOR TB SKIN TEST: ICD-10-CM

## 2019-08-06 DIAGNOSIS — R14.0 GENERALIZED BLOATING: ICD-10-CM

## 2019-08-06 DIAGNOSIS — Z00.00 ANNUAL PHYSICAL EXAM: ICD-10-CM

## 2019-08-06 DIAGNOSIS — Z00.00 ANNUAL PHYSICAL EXAM: Primary | ICD-10-CM

## 2019-08-06 LAB
ALBUMIN SERPL BCP-MCNC: 4.3 G/DL (ref 3.5–5.2)
ALP SERPL-CCNC: 72 U/L (ref 55–135)
ALT SERPL W/O P-5'-P-CCNC: 20 U/L (ref 10–44)
ANION GAP SERPL CALC-SCNC: 9 MMOL/L (ref 8–16)
AST SERPL-CCNC: 21 U/L (ref 10–40)
BILIRUB SERPL-MCNC: 0.3 MG/DL (ref 0.1–1)
BUN SERPL-MCNC: 16 MG/DL (ref 6–20)
CALCIUM SERPL-MCNC: 10 MG/DL (ref 8.7–10.5)
CHLORIDE SERPL-SCNC: 105 MMOL/L (ref 95–110)
CHOLEST SERPL-MCNC: 153 MG/DL (ref 120–199)
CHOLEST/HDLC SERPL: 4.3 {RATIO} (ref 2–5)
CO2 SERPL-SCNC: 24 MMOL/L (ref 23–29)
CREAT SERPL-MCNC: 0.8 MG/DL (ref 0.5–1.4)
EST. GFR  (AFRICAN AMERICAN): >60 ML/MIN/1.73 M^2
EST. GFR  (NON AFRICAN AMERICAN): >60 ML/MIN/1.73 M^2
GLUCOSE SERPL-MCNC: 89 MG/DL (ref 70–110)
HDLC SERPL-MCNC: 36 MG/DL (ref 40–75)
HDLC SERPL: 23.5 % (ref 20–50)
LDLC SERPL CALC-MCNC: 88 MG/DL (ref 63–159)
NONHDLC SERPL-MCNC: 117 MG/DL
POTASSIUM SERPL-SCNC: 4.4 MMOL/L (ref 3.5–5.1)
PROT SERPL-MCNC: 7.8 G/DL (ref 6–8.4)
SODIUM SERPL-SCNC: 138 MMOL/L (ref 136–145)
TRIGL SERPL-MCNC: 145 MG/DL (ref 30–150)
TSH SERPL DL<=0.005 MIU/L-ACNC: 0.45 UIU/ML (ref 0.4–4)

## 2019-08-06 PROCEDURE — 99999 PR PBB SHADOW E&M-EST. PATIENT-LVL III: CPT | Mod: PBBFAC,,, | Performed by: INTERNAL MEDICINE

## 2019-08-06 PROCEDURE — 80061 LIPID PANEL: CPT

## 2019-08-06 PROCEDURE — 36415 COLL VENOUS BLD VENIPUNCTURE: CPT | Mod: PO

## 2019-08-06 PROCEDURE — 99395 PREV VISIT EST AGE 18-39: CPT | Mod: S$GLB,,, | Performed by: INTERNAL MEDICINE

## 2019-08-06 PROCEDURE — 3079F PR MOST RECENT DIASTOLIC BLOOD PRESSURE 80-89 MM HG: ICD-10-PCS | Mod: CPTII,S$GLB,, | Performed by: INTERNAL MEDICINE

## 2019-08-06 PROCEDURE — 99999 PR PBB SHADOW E&M-EST. PATIENT-LVL III: ICD-10-PCS | Mod: PBBFAC,,, | Performed by: INTERNAL MEDICINE

## 2019-08-06 PROCEDURE — 3079F DIAST BP 80-89 MM HG: CPT | Mod: CPTII,S$GLB,, | Performed by: INTERNAL MEDICINE

## 2019-08-06 PROCEDURE — 99395 PR PREVENTIVE VISIT,EST,18-39: ICD-10-PCS | Mod: S$GLB,,, | Performed by: INTERNAL MEDICINE

## 2019-08-06 PROCEDURE — 86580 TB INTRADERMAL TEST: CPT | Mod: S$GLB,,, | Performed by: INTERNAL MEDICINE

## 2019-08-06 PROCEDURE — 3074F SYST BP LT 130 MM HG: CPT | Mod: CPTII,S$GLB,, | Performed by: INTERNAL MEDICINE

## 2019-08-06 PROCEDURE — 82784 ASSAY IGA/IGD/IGG/IGM EACH: CPT

## 2019-08-06 PROCEDURE — 84443 ASSAY THYROID STIM HORMONE: CPT

## 2019-08-06 PROCEDURE — 86580 POCT TB SKIN TEST: ICD-10-PCS | Mod: S$GLB,,, | Performed by: INTERNAL MEDICINE

## 2019-08-06 PROCEDURE — 83516 IMMUNOASSAY NONANTIBODY: CPT

## 2019-08-06 PROCEDURE — 80053 COMPREHEN METABOLIC PANEL: CPT

## 2019-08-06 PROCEDURE — 86703 HIV-1/HIV-2 1 RESULT ANTBDY: CPT

## 2019-08-06 PROCEDURE — 3074F PR MOST RECENT SYSTOLIC BLOOD PRESSURE < 130 MM HG: ICD-10-PCS | Mod: CPTII,S$GLB,, | Performed by: INTERNAL MEDICINE

## 2019-08-06 NOTE — PROGRESS NOTES
Portions of this note are generated with voice recognition software. Typographical errors may exist.     SUBJECTIVE:    This is a/an 33 y.o. female here for primary care visit for  Chief Complaint   Patient presents with    Testing     for school     TB Skin Test     Patient states that after her last evaluation in this clinic for symptoms of recurring vomiting and nausea she was seen by Gynecology which determined that the symptoms were unlikely to be related to a gynecologic cause and she was referred to Gastroenterology.  She completed EGD which was nondiagnostic for the cause of the symptoms.  She feels that she has had significant symptomatic improvement with a strict low-carbohydrate diet.  States that especially having bloating and excess gas has improved on low-carbohydrate diet.  Return to carbohydrate has resulted in relapse of symptoms.  She has never been formally evaluated for celiac disease. She does not have any family members with the condition.  Patient continues on sertraline.  States that mood is doing better than in the past.  Not interested to change pharmacotherapy.    Answers for HPI/ROS submitted by the patient on 8/6/2019   activity change: No  unexpected weight change: No  rhinorrhea: No  trouble swallowing: No  visual disturbance: No  chest tightness: No  polyuria: No  difficulty urinating: No  menstrual problem: No  joint swelling: No  arthralgias: No  confusion: No  dysphoric mood: No      Medications Reviewed and Updated    Past medical, family, and social histories were reviewed and updated.    Review of Systems negative unless otherwise noted in history of present illness-  ROS    General ROS: negative  Psychological ROS: negative  ENT ROS: negative  Endocrine ROS: Negative  Allergy and Immunology ROS: negative  Cardiovascular ROS: negative  Pulmonary ROS: Negative  Gastrointestinal ROS: negative  Genito-Urinary ROS: negative  Musculoskeletal ROS: negative  Neurological ROS:  negative  Dermatological ROS: negative        Allergic:    Review of patient's allergies indicates:   Allergen Reactions    Ciprofloxacin Nausea And Vomiting     Other reaction(s): GI Intolerance    Sulfa (sulfonamide antibiotics) Hives and Rash       OBJECTIVE:  BP: 128/80 Pulse: 82    Wt Readings from Last 3 Encounters:   08/06/19 125.4 kg (276 lb 7.3 oz)   07/08/19 127.5 kg (281 lb)   08/29/18 117.2 kg (258 lb 6.1 oz)    Body mass index is 47.45 kg/m².  Previous Blood Pressure Readings :   BP Readings from Last 3 Encounters:   08/06/19 128/80   07/09/19 139/82   08/29/18 120/76       Physical Exam    GEN: No apparent distress  HEENT: sclera non-icteric, conjunctiva clear  CV: no peripheral edema regular rate and rhythm. No murmurs  PULM: breathing non-labored  ABD: Obese, protuberant abdomen.  PSYCH: appropriate affect  MSK: able to rise from chair without assistance  SKIN: normal skin turgor    Pertinent Labs Reviewed       ASSESSMENT/PLAN:    Annual physical exam  -     TSH; Standing  -     Comprehensive metabolic panel; Standing  -     Lipid panel; Standing  -     HIV 1/2 Ag/Ab (4th Gen); Future; Expected date: 08/06/2019    Visit for TB skin test  -     POCT TB Skin Test Read    Encounter for screening for HIV  -     HIV 1/2 Ag/Ab (4th Gen); Future; Expected date: 08/06/2019    Generalized bloating  -     Tissue transglutaminase, IgA; Future; Expected date: 08/06/2019  -     IgA; Future; Expected date: 08/06/2019    BMI 40.0-44.9, adult.Condition not optimally controlled. Detailed counseling on self care measures. Plan to monitor clinically in addition to plan below or as listed on After Visit Summary.    - RTC disucss medical weight loss          Future Appointments   Date Time Provider Department Center   9/12/2019  1:40 PM Vikash Dyer MD [facility]       Vikash Dyer  8/6/2019  5:31 PM

## 2019-08-07 LAB
HIV 1+2 AB+HIV1 P24 AG SERPL QL IA: NEGATIVE
IGA SERPL-MCNC: 188 MG/DL (ref 40–350)

## 2019-08-08 LAB — TTG IGA SER-ACNC: 6 UNITS

## 2019-12-10 ENCOUNTER — TELEPHONE (OUTPATIENT)
Dept: FAMILY MEDICINE | Facility: CLINIC | Age: 34
End: 2019-12-10

## 2019-12-10 DIAGNOSIS — Z11.1 SCREENING-PULMONARY TB: Primary | ICD-10-CM

## 2019-12-31 ENCOUNTER — TELEPHONE (OUTPATIENT)
Dept: INTERNAL MEDICINE | Facility: CLINIC | Age: 34
End: 2019-12-31

## 2019-12-31 ENCOUNTER — DOCUMENTATION ONLY (OUTPATIENT)
Dept: INTERNAL MEDICINE | Facility: CLINIC | Age: 34
End: 2019-12-31

## 2019-12-31 NOTE — TELEPHONE ENCOUNTER
----- Message from Phil Means sent at 12/31/2019  4:05 PM CST -----  Contact: [phone] / self  Patient would like to have a TB test scheduled. Please Advise.

## 2020-01-02 ENCOUNTER — PATIENT MESSAGE (OUTPATIENT)
Dept: INTERNAL MEDICINE | Facility: CLINIC | Age: 35
End: 2020-01-02

## 2020-03-04 ENCOUNTER — TELEPHONE (OUTPATIENT)
Dept: OPHTHALMOLOGY | Facility: CLINIC | Age: 35
End: 2020-03-04

## 2020-03-04 NOTE — TELEPHONE ENCOUNTER
----- Message from Lola Santoro sent at 2/12/2020  4:14 PM CST -----  Contact: Kinjal(Dr. Phil Christensen office)  Ms. Layton called to schedule a ERG for the patient. She says the patient can be reached at [phone] to schedule. If you need the doctor's office, the number is [phone]

## 2020-03-04 NOTE — TELEPHONE ENCOUNTER
Left Message that I was calling to schedule an ERG.  Gave department call back number for her to call at her convenience

## 2020-03-24 NOTE — MR AVS SNAPSHOT
[facility] Internal Medicine   [facility] [place] [address]  Phone: [phone]  Fax: [phone]                  Anette Villa   2017 1:40 PM   Office Visit    Description:  Female : 1985   Provider:  Vikash Dyer MD   Department:  [facility]           Diagnoses this Visit        Comments    Panic attacks    -  Primary            To Do List           Future Appointments        Provider Department Dept Phone    2017 2:45 PM [facility] [phone]    2017 2:00 PM Vikash Dyer MD [facility] [phone]      Goals (5 Years of Data)     None       These Medications        Disp Refills Start End    lorazepam (ATIVAN) 1 MG tablet 30 tablet 0 2017    Take 1 tablet (1 mg total) by mouth every 12 (twelve) hours as needed (panic attacks). - Oral    [facility] [address] [place] [address] VIVIENNE AGGARWAL [facility] [address] AT [facility] Ph #: [phone]         [facility]     [facility] Care Line -  Assistance  Registered nurses in the [facility] provide clinical advisement, health education, appointment booking, and other advisory services.  Call for this free service at [phone].             Medications           Message regarding Medications     Verify the changes and/or additions to your medication regime listed below are the same as discussed with your clinician today.  If any of these changes or additions are incorrect, please notify your healthcare provider.        START taking these NEW medications        Refills    lorazepam (ATIVAN) 1 MG tablet 0    Sig: Take 1 tablet (1 mg total) by mouth every 12 (twelve) hours as needed (panic attacks).    Class: Normal    Route: Oral           Verify that the below list of medications is an accurate representation of the medications you are currently taking.  If none  "reported, the list may be blank. If incorrect, please contact your healthcare provider. Carry this list with you in case of emergency.           Current Medications     cetirizine (ZYRTEC) 5 MG tablet Take 1 tablet (5 mg total) by mouth once daily.    fluticasone (FLONASE) 50 mcg/actuation nasal spray USE 2 SPRAY EACH NOSTRIL QD    lorazepam (ATIVAN) 1 MG tablet Take 1 tablet (1 mg total) by mouth every 12 (twelve) hours as needed (panic attacks).           Clinical Reference Information           Vital Signs - Last Recorded  Most recent update: 1/18/2017  2:10 PM by Karmen Edwards MA    BP Pulse Ht Wt SpO2 BMI    124/78 (BP Location: Right arm, Patient Position: Sitting, BP Method: Manual) 70 5' 5" (1.651 m) 112.2 kg (247 lb 5.7 oz) 97% 41.16 kg/m2      Blood Pressure          Most Recent Value    BP  124/78      Allergies as of 1/18/2017     Ciprofloxacin    Sulfa (Sulfonamide Antibiotics)      Immunizations Administered on Date of Encounter - 1/18/2017     None      Orders Placed During Today's Visit     Future Labs/Procedures Expected by Expires    T3  1/18/2017 4/18/2017    T4, free  1/18/2017 4/18/2017    TSH  1/18/2017 3/19/2018      " Risks/benefits discussed with patient/surrogate/Vaccine Information Sheet (VIS) provided-VIS date: 8/15/19

## 2020-05-05 ENCOUNTER — PATIENT MESSAGE (OUTPATIENT)
Dept: OPHTHALMOLOGY | Facility: CLINIC | Age: 35
End: 2020-05-05

## 2020-09-18 ENCOUNTER — OFFICE VISIT (OUTPATIENT)
Dept: URGENT CARE | Facility: CLINIC | Age: 35
End: 2020-09-18
Payer: COMMERCIAL

## 2020-09-18 VITALS
BODY MASS INDEX: 47.12 KG/M2 | SYSTOLIC BLOOD PRESSURE: 130 MMHG | OXYGEN SATURATION: 98 % | HEART RATE: 79 BPM | HEIGHT: 64 IN | RESPIRATION RATE: 16 BRPM | WEIGHT: 276 LBS | TEMPERATURE: 99 F | DIASTOLIC BLOOD PRESSURE: 76 MMHG

## 2020-09-18 DIAGNOSIS — T78.40XA ALLERGIC REACTION, INITIAL ENCOUNTER: Primary | ICD-10-CM

## 2020-09-18 PROCEDURE — 99214 PR OFFICE/OUTPT VISIT, EST, LEVL IV, 30-39 MIN: ICD-10-PCS | Mod: 25,S$GLB,, | Performed by: NURSE PRACTITIONER

## 2020-09-18 PROCEDURE — 96372 PR INJECTION,THERAP/PROPH/DIAG2ST, IM OR SUBCUT: ICD-10-PCS | Mod: S$GLB,,, | Performed by: NURSE PRACTITIONER

## 2020-09-18 PROCEDURE — 96372 THER/PROPH/DIAG INJ SC/IM: CPT | Mod: S$GLB,,, | Performed by: NURSE PRACTITIONER

## 2020-09-18 PROCEDURE — 99214 OFFICE O/P EST MOD 30 MIN: CPT | Mod: 25,S$GLB,, | Performed by: NURSE PRACTITIONER

## 2020-09-18 RX ORDER — DIPHENHYDRAMINE HYDROCHLORIDE 50 MG/ML
50 INJECTION INTRAMUSCULAR; INTRAVENOUS ONCE
Status: COMPLETED | OUTPATIENT
Start: 2020-09-18 | End: 2020-09-18

## 2020-09-18 RX ORDER — PREDNISONE 20 MG/1
20 TABLET ORAL DAILY
Qty: 3 TABLET | Refills: 0 | Status: SHIPPED | OUTPATIENT
Start: 2020-09-19 | End: 2020-09-22

## 2020-09-18 RX ADMIN — DIPHENHYDRAMINE HYDROCHLORIDE 50 MG: 50 INJECTION INTRAMUSCULAR; INTRAVENOUS at 12:09

## 2020-09-18 NOTE — PROGRESS NOTES
"aSubjective:       Patient ID: Anette Villa is a 35 y.o. female.    Vitals:  height is 5' 4" (1.626 m) and weight is 125.2 kg (276 lb). Her oral temperature is 98.9 °F (37.2 °C). Her blood pressure is 130/76 and her pulse is 79. Her respiration is 16 and oxygen saturation is 98%.     Chief Complaint: Allergic Reaction (LISINOPRIL)    This is a 35 y.o. female who presents today with a chief complaint of generalized rash and cough after taking her daily dose of lisinopril this morning, patient reports she was started on lisinopril one month ago, patient reports she is constantly coughing and itching all over her body, patient denies wheezing or shortness of breath or trouble breathing, denies chest pain or exertional chest pain, denies dizziness or positional lightheadedness, patient denies nausea vomiting diarrhea or abdominal pain, denies dizziness or positional lightheadedness, patient here with mom          Allergic Reaction  This is a new problem. The current episode started today. The problem has been rapidly improving since onset. The problem is moderate. Associated with: LISINOPRIL X1 MONTH. Time of exposure: X1 MONTH. Associated symptoms include coughing and a rash. Pertinent negatives include no chest pain, diarrhea or vomiting. Past treatments include nothing. There is no history of seasonal allergies. There is no swelling present.       Constitution: Negative for chills, fatigue and fever.   HENT: Negative for congestion and sore throat.    Neck: Negative for painful lymph nodes.   Cardiovascular: Negative for chest pain and leg swelling.   Eyes: Negative for double vision and blurred vision.   Respiratory: Positive for cough. Negative for shortness of breath.    Gastrointestinal: Negative for nausea, vomiting and diarrhea.   Genitourinary: Negative for dysuria, frequency, urgency and history of kidney stones.   Musculoskeletal: Negative for joint pain, joint swelling, muscle cramps and muscle ache. "   Skin: Positive for rash. Negative for color change, pale, bruising and hives.   Allergic/Immunologic: Positive for itching. Negative for seasonal allergies and hives.   Neurological: Negative for dizziness, history of vertigo, light-headedness, passing out and headaches.   Hematologic/Lymphatic: Negative for swollen lymph nodes.   Psychiatric/Behavioral: Negative for nervous/anxious, sleep disturbance and depression. The patient is not nervous/anxious.        Objective:      Physical Exam   Constitutional: She is oriented to person, place, and time. She appears well-developed. She is cooperative.  Non-toxic appearance. She does not appear ill. No distress.      Comments:Initial exam, patient with generalized pruritus and constant cough    After the administration of Benadryl and Solu-Medrol, Patient sitting comfortably on the exam table, non toxic appearance  and answering questions appropriately, no acute distress     HENT:   Head: Normocephalic and atraumatic.   Ears:   Right Ear: Hearing, tympanic membrane, external ear and ear canal normal.   Left Ear: Hearing, tympanic membrane, external ear and ear canal normal.   Nose: Nose normal. No mucosal edema, rhinorrhea or nasal deformity. No epistaxis. Right sinus exhibits no maxillary sinus tenderness and no frontal sinus tenderness. Left sinus exhibits no maxillary sinus tenderness and no frontal sinus tenderness.   Mouth/Throat: Uvula is midline, oropharynx is clear and moist and mucous membranes are normal. No trismus in the jaw. Normal dentition. No uvula swelling. No oropharyngeal exudate, posterior oropharyngeal edema, posterior oropharyngeal erythema, tonsillar abscesses or cobblestoning.   No oropharyngeal erythema or edema noted, able to visualize uvula      Comments: No oropharyngeal erythema or edema noted, able to visualize uvula  Eyes: Conjunctivae and lids are normal. No scleral icterus.   Neck: Trachea normal, full passive range of motion without  pain and phonation normal. Neck supple. No neck rigidity. No edema and no erythema present.   Cardiovascular: Normal rate, regular rhythm, normal heart sounds and normal pulses.   Pulmonary/Chest: Effort normal and breath sounds normal. No stridor. No respiratory distress. She has no decreased breath sounds. She has no wheezes. She has no rhonchi. She has no rales.   No wheezing throughout all lung fields, cough resolved after the administration of Solu-Medrol    Comments: No wheezing throughout all lung fields, cough resolved after the administration of Solu-Medrol    Abdominal: Normal appearance.   Musculoskeletal: Normal range of motion.         General: No deformity.   Neurological: She is alert and oriented to person, place, and time. She exhibits normal muscle tone. Coordination normal.   Skin: Skin is warm, dry, intact, not diaphoretic and not pale. Psychiatric: Her speech is normal and behavior is normal. Judgment and thought content normal.   Nursing note and vitals reviewed.           Patient in no acute distress and nontoxic appearing.  Vitals reassuring.  Patient in no acute respiratory distress.  Patient sent message to her primary for follow-up while in clinic.  Detailed education provided about allergic reaction and plan of care.  Oral Prednisone given, I strongly advised patient to initiate oral prednisone if needed.  Patient discharge with mom.  Patient will follow-up with her primary for blood pressure medication management.    Discussed results/diagnosis/plan in depth with patient in clinic. Strict precautions given to patient to monitor for worsening signs and symptoms. Advised to follow up with primary.All questions answered. Strict ER precautions given. If your symptoms worsens of fail to improve you should go to the Emergency Room. Discharge and follow-up instructions given verbally/printed. Patient voiced understanding and in agreement with current treatment plan.        Assessment:       1.  Allergic reaction, initial encounter        Plan:         Allergic reaction, initial encounter  -     diphenhydrAMINE injection 50 mg  -     methylPREDNISolone sod suc(PF) injection 125 mg  -     Ambulatory referral/consult to Allergy    Other orders  -     predniSONE (DELTASONE) 20 MG tablet; Take 1 tablet (20 mg total) by mouth once daily. for 3 days  Dispense: 3 tablet; Refill: 0      Patient Instructions                                                        PLEASE READ YOUR DISCHARGE INSTRUCTIONS ENTIRELY AS IT CONTAINS IMPORTANT INFORMATION.      Please drink plenty of fluids.  Please get plenty of rest.  Please return here or go to the Emergency Department for any concerns or worsening of condition (worsening rash, difficulty swallowing, shortness of breath, passing out).    If you were prescribed a narcotic medication, do not drive or operate heavy equipment or machinery while taking these medications.    Please take over the counter Zantac as directed for the next 24-72hours as needed.  You received a steroid today - this can elevate your blood pressure, elevate your blood sugar, water weight gain, nervous energy, redness to the face and dimpling of the skin where the shot goes in if you had an injection.   Do not use steroids more than 3 times per year.   If you have diabetes, please check you blood sugar frequently.  If you have high blood pressure, please check your blood pressure frequently.      If you were given a steroid shot in the clinic and have also been given a prescription for a steroid such as Prednisone or a Medrol Dose Pack, please begin taking them tomorrow.    If you have a localized reaction it is ok to apply OTC  topical creams  as directed to the affected area.    Please take an over the counter antihistamine medication (allegra/Claritin/Zyrtec) of your choice as directed.  Benadryl at night - may make you drowsy do not drive after    Please follow up with your primary care doctor or  specialist as needed.    If you  smoke, please stop smoking.    Please arrange follow up with your primary medical clinic as soon as possible. You must understand that you've received an Urgent Care treatment only and that you may be released before all of your medical problems are known or treated. You, the patient, will arrange for follow up as instructed. If your symptoms worsen or fail to improve you should go to the Emergency Room.  Medicine Reaction: Allergic  You are having an allergic reaction to a medicine you have taken. This causes an itchy rash and sometimes swelling of various parts of the body. It may also cause trouble swallowing or breathing. The rash may take a few hours or up to 2 weeks to go away. In the future, remember to tell your healthcare provider about your allergy to this medicine so that medicines of this type won't be used again.  Any medicine can cause an allergic reaction. But the most allergic reactions are caused by:  · Penicillin and related medicines  · Aspirin  · Ibuprofen  · Seizure medicines  Vaccines may also trigger allergies. People whose parents or siblings have allergies are at a higher risk of developing a medicine allergy. Allergy testing may sometimes be needed to figure out the cause.  Symptoms may occur within minutes, hours, or even weeks after exposure to the medicine. It can be a mild or severe reaction, or potentially life threatening. Most of us think of allergic reactions when we have a rash or itchy skin. Symptoms can include:  · Rash, hives, redness, welts, blisters  · Itching, burning, stinging, pain  · Dry, flaky, cracking, scaly skin  · Belly (abdominal) cramps or nausea or stomach pain  · Fever.  Sometimes fever is the only symptom of a drug reaction. In older adults, the risk of fever increases with the number of medicines the person takes.  More severe symptoms include:  · Swelling of the face or lips, or drooling  · Trouble swallowing, feeling like your  throat is closing  · Trouble breathing, wheezing  · Hoarse voice or trouble speaking  · Severe nausea or vomiting or diarrhea    · Feeling faint or lightheaded, rapid heart rate  Home care    The goal of treatment is to help relieve the symptoms, and get you feeling better. Mild to medium medicine reactions usually respond quickly to antihistamines and steroids. The rash will usually fade over several days. But it can sometimes last a couple of weeks. Over the next couple of days, there may be times when it is gets a little worse, and then better again. Here are some things to do:  · Throw the medicine away and dont take it again. The next reaction may be much worse.  · Add this medicine reaction to your electronic medical record.  · When getting a new medicine, always tell the healthcare provider that you are allergic to this medicine. Make certain the provider writes it down in your medical record.  · Avoid tight clothing and anything that heats up your skin (hot showers or baths, direct sunlight). Heat will make itching worse.  · An ice pack will relieve local areas of intense itching and redness. To make an ice pack, put ice cubes in a plastic bag that seals at the top. Wrap the bag in a clean, thin towel or cloth. Dont put ice directly on the skin.  · To help prevent an infection, don't scratch the affected area. Scratching may worsen the reaction. It can damage your skin and lead to an infection. Always check the affected site for signs of an infection.  · Your provider may give you a prescription antihistamine.  · If you are not given a prescription antihistamine, oral diphenhydramine is an over-the-counter antihistamine available at pharmacies and grocery stores. This may be used to reduce itching if large areas of the skin are involved. This antihistamine may make you sleepy, so be careful using it in the daytime or when going to school, working, or driving. Note: Dont use diphenhydramine if you have  glaucoma or if you are a man with trouble urinating due to an enlarged prostate. There are other antihistamines that cause less drowsiness and are a good choice for daytime use. Ask your pharmacist for suggestions.  · Don't use diphenhydramine cream on your skin. It can cause a further skin reaction for some people.  · Contact your healthcare provider and ask what can be used on the affected area to help decrease the itching.  Follow-up care  Follow up with your healthcare provider, or as advised if your symptoms do not continue to improve or they get worse.  Call 911  Call 911 if any of these occur:  · Shortness of breath  · Cool, moist, pale skin  · Swelling in the face, eyelids, mouth, tongue, or lips  · Drooling  · Trouble breathing or swallowing, wheezing  · New or worsening swelling in the mouth, throat, or tongue  · Hoarse voice or trouble speaking   · Fainting or loss of consciousness  · Rapid heart rate  · Feeling of dizziness or weakness or a sudden drop in blood pressure  · Feeling of doom  · Feeling lightheaded  · Severe nausea, vomiting, or diarrhea  When to seek medical advice  Call your healthcare provider right away if any of these occur:  · Continuing or recurring symptoms  · Nausea, abdominal cramps or stomach pain  · Spreading areas of itching, redness or swelling  · Signs of infection:  ¨ Spreading redness  ¨ Increased pain or swelling  ¨ Fever of 100.4°F (38°C) or above lasting for 24 to 48 hours, or as directed by your provider  ¨ Fluid or colored drainage from the affected area  Date Last Reviewed: 3/1/2017  © 8765-7315 [employer]. [address] [place] [address], [place] [address]. All rights reserved. This information is not intended as a substitute for professional medical care. Always follow your healthcare professional's instructions.

## 2020-09-18 NOTE — PATIENT INSTRUCTIONS
PLEASE READ YOUR DISCHARGE INSTRUCTIONS ENTIRELY AS IT CONTAINS IMPORTANT INFORMATION.      Please drink plenty of fluids.  Please get plenty of rest.  Please return here or go to the Emergency Department for any concerns or worsening of condition (worsening rash, difficulty swallowing, shortness of breath, passing out).    If you were prescribed a narcotic medication, do not drive or operate heavy equipment or machinery while taking these medications.    Please take over the counter Zantac as directed for the next 24-72hours as needed.  You received a steroid today - this can elevate your blood pressure, elevate your blood sugar, water weight gain, nervous energy, redness to the face and dimpling of the skin where the shot goes in if you had an injection.   Do not use steroids more than 3 times per year.   If you have diabetes, please check you blood sugar frequently.  If you have high blood pressure, please check your blood pressure frequently.      If you were given a steroid shot in the clinic and have also been given a prescription for a steroid such as Prednisone or a Medrol Dose Pack, please begin taking them tomorrow.    If you have a localized reaction it is ok to apply OTC  topical creams  as directed to the affected area.    Please take an over the counter antihistamine medication (allegra/Claritin/Zyrtec) of your choice as directed.  Benadryl at night - may make you drowsy do not drive after    Please follow up with your primary care doctor or specialist as needed.    If you  smoke, please stop smoking.    Please arrange follow up with your primary medical clinic as soon as possible. You must understand that you've received an Urgent Care treatment only and that you may be released before all of your medical problems are known or treated. You, the patient, will arrange for follow up as instructed. If your symptoms worsen or fail to improve you should  go to the Emergency Room.  Medicine Reaction: Allergic  You are having an allergic reaction to a medicine you have taken. This causes an itchy rash and sometimes swelling of various parts of the body. It may also cause trouble swallowing or breathing. The rash may take a few hours or up to 2 weeks to go away. In the future, remember to tell your healthcare provider about your allergy to this medicine so that medicines of this type won't be used again.  Any medicine can cause an allergic reaction. But the most allergic reactions are caused by:  · Penicillin and related medicines  · Aspirin  · Ibuprofen  · Seizure medicines  Vaccines may also trigger allergies. People whose parents or siblings have allergies are at a higher risk of developing a medicine allergy. Allergy testing may sometimes be needed to figure out the cause.  Symptoms may occur within minutes, hours, or even weeks after exposure to the medicine. It can be a mild or severe reaction, or potentially life threatening. Most of us think of allergic reactions when we have a rash or itchy skin. Symptoms can include:  · Rash, hives, redness, welts, blisters  · Itching, burning, stinging, pain  · Dry, flaky, cracking, scaly skin  · Belly (abdominal) cramps or nausea or stomach pain  · Fever.  Sometimes fever is the only symptom of a drug reaction. In older adults, the risk of fever increases with the number of medicines the person takes.  More severe symptoms include:  · Swelling of the face or lips, or drooling  · Trouble swallowing, feeling like your throat is closing  · Trouble breathing, wheezing  · Hoarse voice or trouble speaking  · Severe nausea or vomiting or diarrhea    · Feeling faint or lightheaded, rapid heart rate  Home care    The goal of treatment is to help relieve the symptoms, and get you feeling better. Mild to medium medicine reactions usually respond quickly to antihistamines and steroids. The rash will usually fade over several days. But  it can sometimes last a couple of weeks. Over the next couple of days, there may be times when it is gets a little worse, and then better again. Here are some things to do:  · Throw the medicine away and dont take it again. The next reaction may be much worse.  · Add this medicine reaction to your electronic medical record.  · When getting a new medicine, always tell the healthcare provider that you are allergic to this medicine. Make certain the provider writes it down in your medical record.  · Avoid tight clothing and anything that heats up your skin (hot showers or baths, direct sunlight). Heat will make itching worse.  · An ice pack will relieve local areas of intense itching and redness. To make an ice pack, put ice cubes in a plastic bag that seals at the top. Wrap the bag in a clean, thin towel or cloth. Dont put ice directly on the skin.  · To help prevent an infection, don't scratch the affected area. Scratching may worsen the reaction. It can damage your skin and lead to an infection. Always check the affected site for signs of an infection.  · Your provider may give you a prescription antihistamine.  · If you are not given a prescription antihistamine, oral diphenhydramine is an over-the-counter antihistamine available at pharmacies and grocery stores. This may be used to reduce itching if large areas of the skin are involved. This antihistamine may make you sleepy, so be careful using it in the daytime or when going to school, working, or driving. Note: Dont use diphenhydramine if you have glaucoma or if you are a man with trouble urinating due to an enlarged prostate. There are other antihistamines that cause less drowsiness and are a good choice for daytime use. Ask your pharmacist for suggestions.  · Don't use diphenhydramine cream on your skin. It can cause a further skin reaction for some people.  · Contact your healthcare provider and ask what can be used on the affected area to help decrease  the itching.  Follow-up care  Follow up with your healthcare provider, or as advised if your symptoms do not continue to improve or they get worse.  Call 911  Call 911 if any of these occur:  · Shortness of breath  · Cool, moist, pale skin  · Swelling in the face, eyelids, mouth, tongue, or lips  · Drooling  · Trouble breathing or swallowing, wheezing  · New or worsening swelling in the mouth, throat, or tongue  · Hoarse voice or trouble speaking   · Fainting or loss of consciousness  · Rapid heart rate  · Feeling of dizziness or weakness or a sudden drop in blood pressure  · Feeling of doom  · Feeling lightheaded  · Severe nausea, vomiting, or diarrhea  When to seek medical advice  Call your healthcare provider right away if any of these occur:  · Continuing or recurring symptoms  · Nausea, abdominal cramps or stomach pain  · Spreading areas of itching, redness or swelling  · Signs of infection:  ¨ Spreading redness  ¨ Increased pain or swelling  ¨ Fever of 100.4°F (38°C) or above lasting for 24 to 48 hours, or as directed by your provider  ¨ Fluid or colored drainage from the affected area  Date Last Reviewed: 3/1/2017  © 6769-5080 [employer]. [address] [place] [address], [place] [address]. All rights reserved. This information is not intended as a substitute for professional medical care. Always follow your healthcare professional's instructions.

## 2020-11-02 ENCOUNTER — OFFICE VISIT (OUTPATIENT)
Dept: FAMILY MEDICINE | Facility: CLINIC | Age: 35
End: 2020-11-02
Payer: COMMERCIAL

## 2020-11-02 VITALS
DIASTOLIC BLOOD PRESSURE: 70 MMHG | WEIGHT: 176 LBS | SYSTOLIC BLOOD PRESSURE: 126 MMHG | BODY MASS INDEX: 33.23 KG/M2 | HEIGHT: 61 IN | HEART RATE: 74 BPM | TEMPERATURE: 97.5 F

## 2020-11-02 DIAGNOSIS — Z00.00 ROUTINE GENERAL MEDICAL EXAMINATION AT A HEALTH CARE FACILITY: Primary | ICD-10-CM

## 2020-11-02 DIAGNOSIS — F41.9 ANXIETY: ICD-10-CM

## 2020-11-02 DIAGNOSIS — K58.2 IRRITABLE BOWEL SYNDROME WITH BOTH CONSTIPATION AND DIARRHEA: ICD-10-CM

## 2020-11-02 PROCEDURE — 99213 OFFICE O/P EST LOW 20 MIN: CPT | Mod: 25 | Performed by: PHYSICIAN ASSISTANT

## 2020-11-02 PROCEDURE — 99385 PREV VISIT NEW AGE 18-39: CPT | Performed by: PHYSICIAN ASSISTANT

## 2020-11-02 RX ORDER — BUPROPION HYDROCHLORIDE 150 MG/1
150 TABLET ORAL EVERY MORNING
Qty: 60 TABLET | Refills: 0 | Status: SHIPPED | OUTPATIENT
Start: 2020-11-02 | End: 2021-01-18

## 2020-11-02 RX ORDER — NORGESTIMATE AND ETHINYL ESTRADIOL 7DAYSX3 28
1 KIT ORAL
COMMUNITY
Start: 2020-06-04 | End: 2020-11-02

## 2020-11-02 RX ORDER — NORGESTIMATE AND ETHINYL ESTRADIOL 7DAYSX3 28
1 KIT ORAL DAILY
Qty: 84 TABLET | Refills: 3 | Status: SHIPPED | OUTPATIENT
Start: 2020-11-02 | End: 2021-07-29

## 2020-11-02 ASSESSMENT — PAIN SCALES - GENERAL: PAINLEVEL: NO PAIN (0)

## 2020-11-02 ASSESSMENT — MIFFLIN-ST. JEOR: SCORE: 1439.67

## 2020-11-02 NOTE — PROGRESS NOTES
"   SUBJECTIVE:   CC: Tessy Garcia is an 35 year old woman who presents for preventive health visit.       Patient has been advised of split billing requirements and indicates understanding: Yes  Healthy Habits:    Do you get at least three servings of calcium containing foods daily (dairy, green leafy vegetables, etc.)? yes    Amount of exercise or daily activities, outside of work: 3 day(s) per week    Problems taking medications regularly No    Medication side effects: No    Have you had an eye exam in the past two years? no    Do you see a dentist twice per year? no    Do you have sleep apnea, excessive snoring or daytime drowsiness?no      -She wanted to talk about maybe switching her anxiety medication. Zoloft isn't helping as much as before.     Was on something years ago (well before kids) - doesn't remember what it was and eventually stopped it  Was put on something else after her first child (son, 7yo) for post partum mood changes. She isn't exactly sure what it was but thinks it was the sertraline   When she became pregnant with her 2nd (who is 4yo) she stopped it and then started it again sometime after birth  Not really sure it is helping. Mentioned that it didn't seem to be working at her last gyn visit but was referred to PCP as gyn didn't feel comfortable switching     STruggles mostly with anxiety. Denies any significant depression. Feels like she is constantly worrying  Not \"horrible\" but is noticeable  Feels the zoloft has really impacted her sex drive and is impacting their marriage  They are in counseling and it was the counselor who suggested a different class of medication     - IBS?     Also has concerns that she has some IBS or GI issues. Had mentioned that as well at her last gyn visit and a referral was mentioned but then nothing came of it  Says she has had issues starting when she was younger  Alternates between constipation and diarrhea  Is a big struggle with any sort of travel so " traveling itself becomes very stressful for her    Today's PHQ-2 Score:   PHQ-2 ( 1999 [employer]) 11/2/2020   Q1: Little interest or pleasure in doing things 0   Q2: Feeling down, depressed or hopeless 0   PHQ-2 Score 0       Abuse: Current or Past(Physical, Sexual or Emotional)- No  Do you feel safe in your environment? Yes        Social History     Tobacco Use     Smoking status: Never Smoker     Smokeless tobacco: Never Used   Substance Use Topics     Alcohol use: Yes     If you drink alcohol do you typically have >3 drinks per day or >7 drinks per week? No                     Reviewed orders with patient.  Reviewed health maintenance and updated orders accordingly - Yes  BP Readings from Last 3 Encounters:   11/02/20 126/70    Wt Readings from Last 3 Encounters:   11/02/20 79.8 kg (176 lb)                    Mammogram not appropriate for this patient based on age.    Pertinent mammograms are reviewed under the imaging tab.  History of abnormal Pap smear: NO - age 30-65 PAP every 5 years with negative HPV co-testing recommended    PAP done in 2018 through [facility]  Will be due in 2021     Reviewed and updated as needed this visit by clinical staff  Tobacco  Allergies  Meds   Med Hx  Surg Hx  Fam Hx  Soc Hx        Reviewed and updated as needed this visit by Provider                    ROS:  CONSTITUTIONAL: NEGATIVE for fever, chills, change in weight  INTEGUMENTARU/SKIN: NEGATIVE for worrisome rashes, moles or lesions  EYES: NEGATIVE for vision changes or irritation  ENT: NEGATIVE for ear, mouth and throat problems  RESP: NEGATIVE for significant cough or SOB  BREAST: NEGATIVE for masses, tenderness or discharge  CV: NEGATIVE for chest pain, palpitations or peripheral edema  GI: NEGATIVE for nausea, abdominal pain, heartburn, or change in bowel habits  : NEGATIVE for unusual urinary or vaginal symptoms. Periods are regular.  MUSCULOSKELETAL: NEGATIVE for significant arthralgias or myalgia  NEURO:  "NEGATIVE for weakness, dizziness or paresthesias  PSYCHIATRIC: NEGATIVE for changes in mood or affect    OBJECTIVE:   /70   Pulse 74   Temp 97.5  F (36.4  C) (Tympanic)   Ht 1.556 m (5' 1.25\")   Wt 79.8 kg (176 lb)   LMP 10/19/2020   BMI 32.98 kg/m    EXAM:  GENERAL: healthy, alert and no distress  EYES: Eyes grossly normal to inspection, PERRL and conjunctivae and sclerae normal  HENT: ear canals and TM's normal, nose and mouth without ulcers or lesions  NECK: no adenopathy, no asymmetry, masses, or scars and thyroid normal to palpation  RESP: lungs clear to auscultation - no rales, rhonchi or wheezes  BREAST: normal without masses, tenderness or nipple discharge and no palpable axillary masses or adenopathy  CV: regular rate and rhythm, normal S1 S2, no S3 or S4, no murmur, click or rub, no peripheral edema and peripheral pulses strong  ABDOMEN: soft, nontender, no hepatosplenomegaly, no masses and bowel sounds normal  MS: no gross musculoskeletal defects noted, no edema  SKIN: no suspicious lesions or rashes  NEURO: Normal strength and tone, mentation intact and speech normal  PSYCH: mentation appears normal, affect normal/bright    Diagnostic Test Results:  none     ASSESSMENT/PLAN:   (Z00.00) Routine general medical examination at a health care facility  (primary encounter diagnosis)  Comment: refilled birth control  Plan: norgestim-eth estrad triphasic (ORTHO         TRI-CYCLEN) 0.18/0.215/0.25 MG-35 MCG tablet            (F41.9) Anxiety  Comment: discussed different medications - selective serotonin reuptake inhibitor's vs SNRI's vs buspar vs wellbutrin. Given sexual side effects and then in discussion concerns with other side effects (weight gain) we discussed a trial of wellbutrin. Although not typically first line for anxiety alone, can sometimes help with anxiety just by managing other symptoms (alterned mood) If it doesn't work, will consider a different serotonin agent given it sounds like " "she has only tried the zoloft  Plan: buPROPion (WELLBUTRIN XL) 150 MG 24 hr tablet            (K58.2) Irritable bowel syndrome with both constipation and diarrhea  Comment: referral placed  Plan: GASTROENTEROLOGY ADULT REF CONSULT ONLY              Patient has been advised of split billing requirements and indicates understanding: Yes  COUNSELING:   Reviewed preventive health counseling, as reflected in patient instructions    Estimated body mass index is 32.98 kg/m  as calculated from the following:    Height as of this encounter: 1.556 m (5' 1.25\").    Weight as of this encounter: 79.8 kg (176 lb).        She reports that she has never smoked. She has never used smokeless tobacco.      Counseling Resources:  ATP IV Guidelines  Pooled Cohorts Equation Calculator  Breast Cancer Risk Calculator  BRCA-Related Cancer Risk Assessment: FHS-7 Tool  FRAX Risk Assessment  ICSI Preventive Guidelines  Dietary Guidelines for Americans, 2010  USDA's MyPlate  ASA Prophylaxis  Lung CA Screening    Baylee Soria PA-C  [facility]  "

## 2020-11-02 NOTE — PATIENT INSTRUCTIONS
[facility]  ([facility]) - [phone]        Preventive Health Recommendations  Female Ages 26 - 39  Yearly exam:   See your health care provider every year in order to    Review health changes.     Discuss preventive care.      Review your medicines if you your doctor has prescribed any.    Until age 30: Get a Pap test every three years (more often if you have had an abnormal result).    After age 30: Talk to your doctor about whether you should have a Pap test every 3 years or have a Pap test with HPV screening every 5 years.   You do not need a Pap test if your uterus was removed (hysterectomy) and you have not had cancer.  You should be tested each year for STDs (sexually transmitted diseases), if you're at risk.   Talk to your provider about how often to have your cholesterol checked.  If you are at risk for diabetes, you should have a diabetes test (fasting glucose).  Shots: Get a flu shot each year. Get a tetanus shot every 10 years.   Nutrition:     Eat at least 5 servings of fruits and vegetables each day.    Eat whole-grain bread, whole-wheat pasta and brown rice instead of white grains and rice.    Get adequate Calcium and Vitamin D.     Lifestyle    Exercise at least 150 minutes a week (30 minutes a day, 5 days of the week). This will help you control your weight and prevent disease.    Limit alcohol to one drink per day.    No smoking.     Wear sunscreen to prevent skin cancer.    See your dentist every six months for an exam and cleaning.

## 2020-11-14 ENCOUNTER — MYC MEDICAL ADVICE (OUTPATIENT)
Dept: FAMILY MEDICINE | Facility: CLINIC | Age: 35
End: 2020-11-14

## 2020-11-14 DIAGNOSIS — F41.9 ANXIETY: Primary | ICD-10-CM

## 2020-12-21 ENCOUNTER — MYC MEDICAL ADVICE (OUTPATIENT)
Dept: FAMILY MEDICINE | Facility: CLINIC | Age: 35
End: 2020-12-21

## 2021-01-09 ENCOUNTER — HEALTH MAINTENANCE LETTER (OUTPATIENT)
Age: 36
End: 2021-01-09

## 2021-01-18 ENCOUNTER — VIRTUAL VISIT (OUTPATIENT)
Dept: FAMILY MEDICINE | Facility: CLINIC | Age: 36
End: 2021-01-18
Payer: COMMERCIAL

## 2021-01-18 DIAGNOSIS — F41.9 ANXIETY: Primary | ICD-10-CM

## 2021-01-18 DIAGNOSIS — F41.0 PANIC ATTACK: ICD-10-CM

## 2021-01-18 PROCEDURE — 99213 OFFICE O/P EST LOW 20 MIN: CPT | Mod: 95 | Performed by: PHYSICIAN ASSISTANT

## 2021-01-18 NOTE — PROGRESS NOTES
Tessy is a 35 year old who is being evaluated via a billable video visit.      How would you like to obtain your AVS? MyChart  If the video visit is dropped, the invitation should be resent by: telephone  Will anyone else be joining your video visit? No    Video Start Time: 1120  Assessment & Plan     (F41.9) Anxiety  (primary encounter diagnosis)  Comment: plan to continue the zoloft as she notes this has helped overall with her anxiety and I suspect the nighttime panic attacks may have more of a physiologic origin than due to her anxiety as discussed below  Plan: sertraline (ZOLOFT) 50 MG tablet            (F41.0) Panic attack  Comment: only occurring at night - waking her up. Had 1 month where it was better and now occurred again a few nights ago. She did have really bad heartburn during pregnancy - better after although does note the other day she was burping more and has known GI issues/suspected IBS that we had discussed at her last visit. Discussed the possibility that her panic attacks may actually be some silent reflux triggering a response in her body that is interpreted as panic, especially as she notes that at least for most of the episodes it has been preceded but an upset or nauseated stomach  Plan: Trial of omeprazole 20mg in the morning and pepcid 20mg at bedtime  She is making some dietary changes (cutting out sugar)         Return in about 4 weeks (around 2/15/2021) for If not improving or worsening.    MAR[facility]IN [facility]connell [facility] Still is a 35 year old who presents to clinic today for the following health issues     HPI       Medication Followup of Sertraline 50 mg tablet     Taking Medication as prescribed: yes    Side Effects:  Trouble sleeping, sweating during the night, anxiety during the night     Medication Helping Symptoms:  NO-maybe waning to try something new      Middle of December at some time had an upset stomach and woke up in a panic  "attack  Tried to make an appointment but could not get in so went back to her old provider  Was advised to increase sertraline to 1.5 tabs/day  Had also met with the GI doctor around that time - wasn't sure what was going on but recommended miralax  She tried that and it just \"messed her stomach up\"     Gave up alcohol a month ago as well, thinking that it was contributing to it    Was doing really well for the last 3-4 weeks until a few nights ago (Thursday)  Stomach was a little upset before going to bed  Woke up that night feeling panicked - racing heart, upset stomach        Review of Systems   Remainder of ROS obtained and found to be negative other than that which was documented above        Objective           Vitals:  No vitals were obtained today due to virtual visit.    Physical Exam   GENERAL: Healthy, alert and no distress  EYES: Eyes grossly normal to inspection.  No discharge or erythema, or obvious scleral/conjunctival abnormalities.  RESP: No audible wheeze, cough, or visible cyanosis.  No visible retractions or increased work of breathing.    SKIN: Visible skin clear. No significant rash, abnormal pigmentation or lesions.  NEURO: Cranial nerves grossly intact.  Mentation and speech appropriate for age.  PSYCH: Mentation appears normal, affect normal/bright, judgement and insight intact, normal speech and appearance well-groomed.        Video-Visit Details    Type of service:  Video Visit    Video End Time:1140    Originating Location (pt. Location): Home    Distant Location (provider location):  [facility]     Platform used for Video Visit: Ericka"

## 2021-01-18 NOTE — PATIENT INSTRUCTIONS
"To treat a possible \"stomach\" component to the panic symptoms, try the following:     Omeprazole (prilosec) 20mg every morning     Famotidine (pepcid) 20mg at bedtime or every evening    Take both daily for the next 3-4 weeks at least to see if the panic attacks continue to occur. If they do NOT, I may suggest staying on this for another 4 weeks and then if still doing well, taper off which we can discuss at that time    If no change or the frequency increases, check back with me sooner  "

## 2021-01-19 ENCOUNTER — TRANSFERRED RECORDS (OUTPATIENT)
Dept: HEALTH INFORMATION MANAGEMENT | Facility: CLINIC | Age: 36
End: 2021-01-19

## 2021-02-02 ENCOUNTER — TRANSFERRED RECORDS (OUTPATIENT)
Dept: HEALTH INFORMATION MANAGEMENT | Facility: CLINIC | Age: 36
End: 2021-02-02

## 2021-03-23 ENCOUNTER — VIRTUAL VISIT (OUTPATIENT)
Dept: FAMILY MEDICINE | Facility: CLINIC | Age: 36
End: 2021-03-23
Payer: COMMERCIAL

## 2021-03-23 DIAGNOSIS — Z11.59 ENCOUNTER FOR SCREENING FOR OTHER VIRAL DISEASES: ICD-10-CM

## 2021-03-23 DIAGNOSIS — K58.2 IRRITABLE BOWEL SYNDROME WITH BOTH CONSTIPATION AND DIARRHEA: ICD-10-CM

## 2021-03-23 DIAGNOSIS — F41.9 ANXIETY: ICD-10-CM

## 2021-03-23 DIAGNOSIS — K21.9 GASTROESOPHAGEAL REFLUX DISEASE, UNSPECIFIED WHETHER ESOPHAGITIS PRESENT: Primary | ICD-10-CM

## 2021-03-23 PROCEDURE — 99214 OFFICE O/P EST MOD 30 MIN: CPT | Mod: 95 | Performed by: PHYSICIAN ASSISTANT

## 2021-03-23 ASSESSMENT — ANXIETY QUESTIONNAIRES
5. BEING SO RESTLESS THAT IT IS HARD TO SIT STILL: NOT AT ALL
6. BECOMING EASILY ANNOYED OR IRRITABLE: NOT AT ALL
1. FEELING NERVOUS, ANXIOUS, OR ON EDGE: SEVERAL DAYS
7. FEELING AFRAID AS IF SOMETHING AWFUL MIGHT HAPPEN: NOT AT ALL
IF YOU CHECKED OFF ANY PROBLEMS ON THIS QUESTIONNAIRE, HOW DIFFICULT HAVE THESE PROBLEMS MADE IT FOR YOU TO DO YOUR WORK, TAKE CARE OF THINGS AT HOME, OR GET ALONG WITH OTHER PEOPLE: NOT DIFFICULT AT ALL
2. NOT BEING ABLE TO STOP OR CONTROL WORRYING: NOT AT ALL
GAD7 TOTAL SCORE: 2
3. WORRYING TOO MUCH ABOUT DIFFERENT THINGS: SEVERAL DAYS

## 2021-03-23 ASSESSMENT — PATIENT HEALTH QUESTIONNAIRE - PHQ9: 5. POOR APPETITE OR OVEREATING: NOT AT ALL

## 2021-03-23 NOTE — PROGRESS NOTES
Tessy is a 35 year old who is being evaluated via a billable video visit.      How would you like to obtain your AVS? MyChart  If the video visit is dropped, the invitation should be resent by: [phone]  Will anyone else be joining your video visit? No    Video Start Time: unable to connect. I could not hear patient. She could see and hear me but then I froze on her screen. Changed to telephone visit    Assessment & Plan     (K21.9) Gastroesophageal reflux disease, unspecified whether esophagitis present  (primary encounter diagnosis)  Comment: did have improvement in symptoms with 1 month of PPI/H2 blocker. Stopped after 1 month. Worried because symptoms seemed to start abruptly in Nov/Dec. Discussed EGD to rule out underlying pathology. Would continue H2 blocker but daily or two times daily vs as needed at this time.   Plan: GASTROENTEROLOGY ADULT REF PROCEDURE ONLY            (K58.2) Irritable bowel syndrome with both constipation and diarrhea  Comment: saw [facility] for this within the year. She feels like has can manage this with dietary modifications  Plan: continue diet modifications    (F41.9) Anxiety  Comment: on zoloft. Anxiety had been better although she notes increased anxiety lately over GI issues. Consider possibility of zoloft contributing to change in gut symptoms although she has been on this longer than the recent GI issues.   Plan: work up with EGD as noted above. Medications adjustments to follow pending results      Return in about 4 weeks (around 4/20/2021) for EGD results, follow up.    Baylee Soria PA-C  [facility]    Brendan Still is a 35 year old who presents for the following health issues     HPI     Chief Complaint   Patient presents with     RECHECK     reflux/stomach issues     - This was last discuss at her virtual visit back in January. She says it seems to be getting worse.    Anxiety Follow-Up    How are you doing with your anxiety since your last  "visit? Improved     Are you having other symptoms that might be associated with anxiety? No    Have you had a significant life event? No     Are you feeling depressed? No    Do you have any concerns with your use of alcohol or other drugs? No    Social History     Tobacco Use     Smoking status: Never Smoker     Smokeless tobacco: Never Used   Substance Use Topics     Alcohol use: Yes     Drug use: Never     BIRDIE-7 SCORE 3/23/2021   Total Score 2     No flowsheet data found.        How many servings of fruits and vegetables do you eat daily?  2-3    On average, how many sweetened beverages do you drink each day (Examples: soda, juice, sweet tea, etc.  Do NOT count diet or artificially sweetened beverages)?   0    How many days per week do you exercise enough to make your heart beat faster? 4    How many minutes a day do you exercise enough to make your heart beat faster? 30 - 60    How many days per week do you miss taking your medication? 0    All of this seemed to come out of nowhere last Nov/Dec  Having more burping, nausea, filling quickly    At last visit (Nov) was started on reflux medication because she was waking up at night with panic attacks  She was surprised but states it did help with the panic and symptoms did improve    Now off the medications - did them for about a month    Does work with a dietician  Has cut alcohol out completely as she thought that was a trigger  Can trigger it at times but other times okay  Had a small glass with dinner the other night and was up overnight vomiting    Did  some pepcid to take \"as needed\"   Has taken it about 3-4x in the last week  Hard to know when to take it because her symptoms are atypical        Review of Systems   Remainder of ROS obtained and found to be negative other than that which was documented above        Objective           Vitals:  No vitals were obtained today due to virtual visit.    Physical Exam   GENERAL: Healthy, alert and no " distress  EYES: Eyes grossly normal to inspection.  No discharge or erythema, or obvious scleral/conjunctival abnormalities.  RESP: No audible wheeze, cough, or visible cyanosis.  No visible retractions or increased work of breathing.    SKIN: Visible skin clear. No significant rash, abnormal pigmentation or lesions.  NEURO: Cranial nerves grossly intact.  Mentation and speech appropriate for age.  PSYCH: Mentation appears normal, affect normal/bright, judgement and insight intact, normal speech and appearance well-groomed.        Telephone Duration (unable to connect over video): 15 minutes

## 2021-03-24 ASSESSMENT — ANXIETY QUESTIONNAIRES: GAD7 TOTAL SCORE: 2

## 2021-03-29 DIAGNOSIS — Z11.59 ENCOUNTER FOR SCREENING FOR OTHER VIRAL DISEASES: ICD-10-CM

## 2021-03-29 LAB
SARS-COV-2 RNA RESP QL NAA+PROBE: NORMAL
SPECIMEN SOURCE: NORMAL

## 2021-03-29 PROCEDURE — U0003 INFECTIOUS AGENT DETECTION BY NUCLEIC ACID (DNA OR RNA); SEVERE ACUTE RESPIRATORY SYNDROME CORONAVIRUS 2 (SARS-COV-2) (CORONAVIRUS DISEASE [COVID-19]), AMPLIFIED PROBE TECHNIQUE, MAKING USE OF HIGH THROUGHPUT TECHNOLOGIES AS DESCRIBED BY CMS-2020-01-R: HCPCS | Performed by: SURGERY

## 2021-03-29 PROCEDURE — U0005 INFEC AGEN DETEC AMPLI PROBE: HCPCS | Performed by: SURGERY

## 2021-03-30 ENCOUNTER — ANESTHESIA EVENT (OUTPATIENT)
Dept: GASTROENTEROLOGY | Facility: CLINIC | Age: 36
End: 2021-03-30
Payer: COMMERCIAL

## 2021-03-30 LAB
LABORATORY COMMENT REPORT: NORMAL
SARS-COV-2 RNA RESP QL NAA+PROBE: NEGATIVE
SPECIMEN SOURCE: NORMAL

## 2021-03-30 NOTE — ANESTHESIA PREPROCEDURE EVALUATION
Anesthesia Pre-Procedure Evaluation    Patient: Tessy Garcia   MRN: [id number] : 1985        Preoperative Diagnosis: Gastroesophageal reflux disease, unspecified whether esophagitis present [K21.9]   Procedure : Procedure(s):  ESOPHAGOGASTRODUODENOSCOPY (EGD)     History reviewed. No pertinent past medical history.   History reviewed. No pertinent surgical history.   No Known Allergies   Social History     Tobacco Use     Smoking status: Never Smoker     Smokeless tobacco: Never Used   Substance Use Topics     Alcohol use: Yes      Wt Readings from Last 1 Encounters:   20 79.8 kg (176 lb)        Anesthesia Evaluation            ROS/MED HX  ENT/Pulmonary:  - neg pulmonary ROS     Neurologic:  - neg neurologic ROS     Cardiovascular:  - neg cardiovascular ROS     METS/Exercise Tolerance:     Hematologic:  - neg hematologic  ROS     Musculoskeletal:  - neg musculoskeletal ROS     GI/Hepatic: Comment: IBS  Increased risk of aspiration due to GERD    (+) GERD, Symptomatic, Inflammatory bowel disease,     Renal/Genitourinary:  - neg Renal ROS     Endo:     (+) Obesity,     Psychiatric/Substance Use:     (+) psychiatric history anxiety     Infectious Disease:  - neg infectious disease ROS     Malignancy:  - neg malignancy ROS     Other:  - neg other ROS          Physical Exam    Airway        Mallampati: I   TM distance: > 3 FB   Neck ROM: full   Mouth opening: > 3 cm    Respiratory Devices and Support         Dental  no notable dental history         Cardiovascular   cardiovascular exam normal          Pulmonary   pulmonary exam normal                OUTSIDE LABS:  CBC: No results found for: WBC, HGB, HCT, PLT  BMP: No results found for: NA, POTASSIUM, CHLORIDE, CO2, BUN, CR, GLC  COAGS: No results found for: PTT, INR, FIBR  POC: No results found for: BGM, HCG, HCGS  HEPATIC: No results found for: ALBUMIN, PROTTOTAL, ALT, AST, GGT, ALKPHOS, BILITOTAL, BILIDIRECT, CLEOPATRA  OTHER: No results found for: PH,  LACT, A1C, SHREYAS, PHOS, MAG, LIPASE, AMYLASE, TSH, T4, T3, CRP, SED    Anesthesia Plan    ASA Status:  2   NPO Status:  NPO Appropriate    Anesthesia Type: General.   Induction: Propofol.   Maintenance: TIVA.        Consents    Anesthesia Plan(s) and associated risks, benefits, and realistic alternatives discussed. Questions answered and patient/representative(s) expressed understanding.     - Discussed with:  Patient         Postoperative Care    Pain management: IV analgesics, Oral pain medications, Multi-modal analgesia.   PONV prophylaxis: Ondansetron (or other 5HT-3), Dexamethasone or Solumedrol     Comments:                NEERAJ Welsh CRNA

## 2021-03-31 ENCOUNTER — HOSPITAL ENCOUNTER (OUTPATIENT)
Facility: CLINIC | Age: 36
Discharge: HOME | End: 2021-03-31
Attending: SURGERY | Admitting: SURGERY
Payer: COMMERCIAL

## 2021-03-31 ENCOUNTER — ANESTHESIA (OUTPATIENT)
Dept: GASTROENTEROLOGY | Facility: CLINIC | Age: 36
End: 2021-03-31
Payer: COMMERCIAL

## 2021-03-31 VITALS
HEIGHT: 61 IN | DIASTOLIC BLOOD PRESSURE: 61 MMHG | BODY MASS INDEX: 30.78 KG/M2 | WEIGHT: 163 LBS | HEART RATE: 64 BPM | OXYGEN SATURATION: 96 % | RESPIRATION RATE: 16 BRPM | SYSTOLIC BLOOD PRESSURE: 113 MMHG | TEMPERATURE: 97.9 F

## 2021-03-31 DIAGNOSIS — K29.70 GASTRITIS WITHOUT BLEEDING, UNSPECIFIED CHRONICITY, UNSPECIFIED GASTRITIS TYPE: Primary | ICD-10-CM

## 2021-03-31 LAB
HCG UR QL: NEGATIVE
UPPER GI ENDOSCOPY: NORMAL

## 2021-03-31 PROCEDURE — 43239 EGD BIOPSY SINGLE/MULTIPLE: CPT | Performed by: SURGERY

## 2021-03-31 PROCEDURE — 258N000003 HC RX IP 258 OP 636: Performed by: SURGERY

## 2021-03-31 PROCEDURE — 88305 TISSUE EXAM BY PATHOLOGIST: CPT | Mod: TC | Performed by: SURGERY

## 2021-03-31 PROCEDURE — 88305 TISSUE EXAM BY PATHOLOGIST: CPT | Mod: 26 | Performed by: PATHOLOGY

## 2021-03-31 PROCEDURE — 370N000017 HC ANESTHESIA TECHNICAL FEE, PER MIN: Performed by: SURGERY

## 2021-03-31 PROCEDURE — 250N000009 HC RX 250: Performed by: NURSE ANESTHETIST, CERTIFIED REGISTERED

## 2021-03-31 PROCEDURE — 250N000009 HC RX 250: Performed by: SURGERY

## 2021-03-31 PROCEDURE — 250N000011 HC RX IP 250 OP 636: Performed by: NURSE ANESTHETIST, CERTIFIED REGISTERED

## 2021-03-31 PROCEDURE — 81025 URINE PREGNANCY TEST: CPT | Performed by: SURGERY

## 2021-03-31 RX ORDER — OMEPRAZOLE 40 MG/1
40 CAPSULE, DELAYED RELEASE ORAL DAILY
Qty: 30 CAPSULE | Refills: 0 | Status: SHIPPED | OUTPATIENT
Start: 2021-03-31 | End: 2021-04-23

## 2021-03-31 RX ORDER — LIDOCAINE HYDROCHLORIDE 10 MG/ML
INJECTION, SOLUTION INFILTRATION; PERINEURAL PRN
Status: DISCONTINUED | OUTPATIENT
Start: 2021-03-31 | End: 2021-03-31

## 2021-03-31 RX ORDER — SODIUM CHLORIDE, SODIUM LACTATE, POTASSIUM CHLORIDE, CALCIUM CHLORIDE 600; 310; 30; 20 MG/100ML; MG/100ML; MG/100ML; MG/100ML
INJECTION, SOLUTION INTRAVENOUS CONTINUOUS
Status: DISCONTINUED | OUTPATIENT
Start: 2021-03-31 | End: 2021-03-31 | Stop reason: HOSPADM

## 2021-03-31 RX ORDER — NALOXONE HYDROCHLORIDE 0.4 MG/ML
0.4 INJECTION, SOLUTION INTRAMUSCULAR; INTRAVENOUS; SUBCUTANEOUS
Status: CANCELLED | OUTPATIENT
Start: 2021-03-31

## 2021-03-31 RX ORDER — PROPOFOL 10 MG/ML
INJECTION, EMULSION INTRAVENOUS PRN
Status: DISCONTINUED | OUTPATIENT
Start: 2021-03-31 | End: 2021-03-31

## 2021-03-31 RX ORDER — NALOXONE HYDROCHLORIDE 0.4 MG/ML
0.2 INJECTION, SOLUTION INTRAMUSCULAR; INTRAVENOUS; SUBCUTANEOUS
Status: CANCELLED | OUTPATIENT
Start: 2021-03-31

## 2021-03-31 RX ORDER — ONDANSETRON 2 MG/ML
4 INJECTION INTRAMUSCULAR; INTRAVENOUS
Status: DISCONTINUED | OUTPATIENT
Start: 2021-03-31 | End: 2021-03-31 | Stop reason: HOSPADM

## 2021-03-31 RX ORDER — GLYCOPYRROLATE 0.2 MG/ML
INJECTION, SOLUTION INTRAMUSCULAR; INTRAVENOUS PRN
Status: DISCONTINUED | OUTPATIENT
Start: 2021-03-31 | End: 2021-03-31

## 2021-03-31 RX ORDER — FLUMAZENIL 0.1 MG/ML
0.2 INJECTION, SOLUTION INTRAVENOUS
Status: CANCELLED | OUTPATIENT
Start: 2021-03-31 | End: 2021-03-31

## 2021-03-31 RX ORDER — LIDOCAINE 40 MG/G
CREAM TOPICAL
Status: DISCONTINUED | OUTPATIENT
Start: 2021-03-31 | End: 2021-03-31 | Stop reason: HOSPADM

## 2021-03-31 RX ADMIN — TOPICAL ANESTHETIC 1 SPRAY: 200 SPRAY DENTAL; PERIODONTAL at 10:57

## 2021-03-31 RX ADMIN — PROPOFOL 150 MG: 10 INJECTION, EMULSION INTRAVENOUS at 10:58

## 2021-03-31 RX ADMIN — LIDOCAINE HYDROCHLORIDE 100 MG: 10 INJECTION, SOLUTION INFILTRATION; PERINEURAL at 10:58

## 2021-03-31 RX ADMIN — SODIUM CHLORIDE, POTASSIUM CHLORIDE, SODIUM LACTATE AND CALCIUM CHLORIDE 30 ML: 600; 310; 30; 20 INJECTION, SOLUTION INTRAVENOUS at 10:04

## 2021-03-31 RX ADMIN — SODIUM CHLORIDE, POTASSIUM CHLORIDE, SODIUM LACTATE AND CALCIUM CHLORIDE: 600; 310; 30; 20 INJECTION, SOLUTION INTRAVENOUS at 10:54

## 2021-03-31 RX ADMIN — PROPOFOL 150 MG: 10 INJECTION, EMULSION INTRAVENOUS at 11:02

## 2021-03-31 RX ADMIN — LIDOCAINE HYDROCHLORIDE 1 ML: 10 INJECTION, SOLUTION EPIDURAL; INFILTRATION; INTRACAUDAL; PERINEURAL at 10:05

## 2021-03-31 RX ADMIN — GLYCOPYRROLATE 0.1 MG: 0.2 INJECTION, SOLUTION INTRAMUSCULAR; INTRAVENOUS at 10:58

## 2021-03-31 ASSESSMENT — MIFFLIN-ST. JEOR: SCORE: 1380.7

## 2021-03-31 NOTE — H&P
35 year old year old female here for upper endoscopy for reflux.  Ongoing last several months.  She is on pepcid with minimal improvement.  Worse at night. Wakes her from sleep.          Patient Active Problem List   Diagnosis     Irritable bowel syndrome with both constipation and diarrhea     Anxiety       History reviewed. No pertinent past medical history.    History reviewed. No pertinent surgical history.    History reviewed. No pertinent family history.    No current outpatient medications on file.       No Known Allergies    Pt reports that she has never smoked. She has never used smokeless tobacco. She reports current alcohol use. She reports that she does not use drugs.    Exam:    Awake, Alert OX3  Lungs - CTA bilaterally  CV - RRR, no murmurs, distal pulses intact  Abd - soft, non-distended, non-tender, +BS  Extr - No cyanosis or edema    A/P 35 year old year old female in need of upper endoscopy for reflux. Risks, benefits, alternatives, and complications were discussed including the possibility of perforation and the patient agreed to proceed.    Gerry Chiu, DO on 3/31/2021 at 10:59 AM

## 2021-03-31 NOTE — ANESTHESIA POSTPROCEDURE EVALUATION
Patient: Tessy Garcia    Procedure(s):  ESOPHAGOGASTRODUODENOSCOPY, WITH BIOPSY    Diagnosis:Gastroesophageal reflux disease, unspecified whether esophagitis present [K21.9]  Diagnosis Additional Information: No value filed.    Anesthesia Type:  General    Note:  Disposition: Outpatient   Postop Pain Control: Uneventful            Sign Out: Well controlled pain   PONV: No   Neuro/Psych: Uneventful            Sign Out: Acceptable/Baseline neuro status   Airway/Respiratory: Uneventful            Sign Out: Acceptable/Baseline resp. status   CV/Hemodynamics: Uneventful            Sign Out: Acceptable CV status   Other NRE: NONE   DID A NON-ROUTINE EVENT OCCUR? No         Last vitals:  Vitals:    03/31/21 0939 03/31/21 1109   BP: 132/77    Pulse: 68    Resp: 16 (P) 16   Temp: 36.6  C (97.9  F)    SpO2: 98% (P) 97%       Last vitals prior to Anesthesia Care Transfer:  CRNA VITALS  3/31/2021 1036 - 3/31/2021 1111      3/31/2021             Pulse:  74    Ht Rate:  76    SpO2:  98 %          Electronically Signed By: NEERAJ Lamas CRNA  March 31, 2021  11:11 AM

## 2021-03-31 NOTE — ANESTHESIA CARE TRANSFER NOTE
Patient: Tessy Garcia    Procedure(s):  ESOPHAGOGASTRODUODENOSCOPY, WITH BIOPSY    Diagnosis: Gastroesophageal reflux disease, unspecified whether esophagitis present [K21.9]  Diagnosis Additional Information: No value filed.    Anesthesia Type:   General     Note:    Oropharynx: oropharynx clear of all foreign objects  Level of Consciousness: drowsy  Oxygen Supplementation: nasal cannula    Independent Airway: airway patency satisfactory and stable  Dentition: dentition unchanged  Vital Signs Stable: post-procedure vital signs reviewed and stable  Report to RN Given: handoff report given  Patient transferred to: Phase II    Handoff Report: Identifed the Patient, Identified the Reponsible Provider, Reviewed the pertinent medical history, Discussed the surgical course, Reviewed Intra-OP anesthesia mangement and issues during anesthesia, Set expectations for post-procedure period and Allowed opportunity for questions and acknowledgement of understanding      Vitals: (Last set prior to Anesthesia Care Transfer)  CRNA VITALS  3/31/2021 1036 - 3/31/2021 1107      3/31/2021             Pulse:  74    Ht Rate:  76    SpO2:  98 %        Electronically Signed By: NEERAJ Lamas CRNA  March 31, 2021  11:07 AM

## 2021-04-04 LAB — COPATH REPORT: NORMAL

## 2021-04-06 ENCOUNTER — MYC MEDICAL ADVICE (OUTPATIENT)
Dept: FAMILY MEDICINE | Facility: CLINIC | Age: 36
End: 2021-04-06

## 2021-04-06 DIAGNOSIS — F41.9 ANXIETY: Primary | ICD-10-CM

## 2021-04-07 RX ORDER — SERTRALINE HYDROCHLORIDE 25 MG/1
25 TABLET, FILM COATED ORAL DAILY
Qty: 90 TABLET | Refills: 0 | Status: SHIPPED | OUTPATIENT
Start: 2021-04-07 | End: 2021-07-05

## 2021-04-22 DIAGNOSIS — K29.70 GASTRITIS WITHOUT BLEEDING, UNSPECIFIED CHRONICITY, UNSPECIFIED GASTRITIS TYPE: ICD-10-CM

## 2021-04-22 NOTE — TELEPHONE ENCOUNTER
Requested Prescriptions   Pending Prescriptions Disp Refills     omeprazole (PRILOSEC) 40 MG DR capsule 30 capsule 0     Sig: Take 1 capsule (40 mg) by mouth daily       There is no refill protocol information for this order        Last office visit: Visit date not found with prescribing provider:  Dr. Chiu   Future Office Visit:          Denise Behrendt  Specialty CSS

## 2021-04-23 RX ORDER — OMEPRAZOLE 40 MG/1
40 CAPSULE, DELAYED RELEASE ORAL DAILY
Qty: 90 CAPSULE | Refills: 0 | Status: SHIPPED | OUTPATIENT
Start: 2021-04-23 | End: 2021-07-29

## 2021-05-07 ENCOUNTER — VIRTUAL VISIT (OUTPATIENT)
Dept: FAMILY MEDICINE | Facility: CLINIC | Age: 36
End: 2021-05-07
Payer: COMMERCIAL

## 2021-05-07 DIAGNOSIS — K29.80 DUODENITIS: ICD-10-CM

## 2021-05-07 DIAGNOSIS — K58.2 IRRITABLE BOWEL SYNDROME WITH BOTH CONSTIPATION AND DIARRHEA: ICD-10-CM

## 2021-05-07 DIAGNOSIS — K29.70 GASTRITIS WITHOUT BLEEDING, UNSPECIFIED CHRONICITY, UNSPECIFIED GASTRITIS TYPE: Primary | ICD-10-CM

## 2021-05-07 PROCEDURE — 99213 OFFICE O/P EST LOW 20 MIN: CPT | Mod: 95 | Performed by: PHYSICIAN ASSISTANT

## 2021-05-07 NOTE — PROGRESS NOTES
"Tessy is a 35 year old who is being evaluated via a billable video visit.      How would you like to obtain your AVS? MyChart  If the video visit is dropped, the invitation should be resent by: Text to cell phone: [phone]  Will anyone else be joining your video visit? No    Video Start Time: 0813    Assessment & Plan       ICD-10-CM    1. Gastritis without bleeding, unspecified chronicity, unspecified gastritis type  K29.70    2. Duodenitis  K29.80    3. Irritable bowel syndrome with both constipation and diarrhea  K58.2      Reviewed EGD notes and pathology report with patient  Discussed etiology and treatment and need to likely complete a longer treatment and slow taper off monitoring symptoms  IBS has been better with changes to diet  Will continue the omeprazole 40mg every morning with pepcid at bedtime x3 months  Then plan to decrease to omeprazole 20mg with pepcid for 2-4 weeks and continue to slowly taper the omeprazole from there    Patient to my chart me as needed with questions. No visit needed again unless new symptoms or changes that she would like to discuss      BMI:   Estimated body mass index is 30.55 kg/m  as calculated from the following:    Height as of 3/31/21: 1.556 m (5' 1.25\").    Weight as of 3/31/21: 73.9 kg (163 lb).     Return in about 6 months (around 11/7/2021) for Physical Exam.    Baylee Soria PA-C  [facility] FLORY Still is a 35 year old who presents for the following health issues     HPI       Patient is wanting to go over getting diagnosed with gastritis and what the next steps are.   Did improve with the omeprazole  Didn't realize another script was sent in so was off for a few weeks  Symptoms did recur  Has had a few episodes of eating/drinking certain foods that seems to irritate it - had wine one night which didn't sit well  Tried it again later but took a pepcid beforehand and that seemed to help  Ate late the other night because of a " soccer game and then got a migraine so had to lay down - got very nauseated and the nausea lasted most of the next day        Review of Systems   Remainder of ROS obtained and found to be negative other than that which was documented above        Objective           Vitals:  No vitals were obtained today due to virtual visit.    Physical Exam   GENERAL: Healthy, alert and no distress  EYES: Eyes grossly normal to inspection.  No discharge or erythema, or obvious scleral/conjunctival abnormalities.  RESP: No audible wheeze, cough, or visible cyanosis.  No visible retractions or increased work of breathing.    SKIN: Visible skin clear. No significant rash, abnormal pigmentation or lesions.  NEURO: Cranial nerves grossly intact.  Mentation and speech appropriate for age.  PSYCH: Mentation appears normal, affect normal/bright, judgement and insight intact, normal speech and appearance well-groomed.    Diagnostic Tests:  Reviewed EGD/pathology        Video-Visit Details    Type of service:  Video Visit    Video End Time:0831    Originating Location (pt. Location): Home    Distant Location (provider location):  [facility]     Platform used for Video Visit: Datam

## 2021-07-03 DIAGNOSIS — F41.9 ANXIETY: ICD-10-CM

## 2021-07-05 RX ORDER — SERTRALINE HYDROCHLORIDE 25 MG/1
25 TABLET, FILM COATED ORAL DAILY
Qty: 90 TABLET | Refills: 1 | Status: SHIPPED | OUTPATIENT
Start: 2021-07-05 | End: 2022-01-18

## 2021-07-28 ENCOUNTER — MYC MEDICAL ADVICE (OUTPATIENT)
Dept: FAMILY MEDICINE | Facility: CLINIC | Age: 36
End: 2021-07-28

## 2021-07-28 DIAGNOSIS — Z00.00 ROUTINE GENERAL MEDICAL EXAMINATION AT A HEALTH CARE FACILITY: ICD-10-CM

## 2021-07-29 DIAGNOSIS — K29.70 GASTRITIS WITHOUT BLEEDING, UNSPECIFIED CHRONICITY, UNSPECIFIED GASTRITIS TYPE: ICD-10-CM

## 2021-07-29 RX ORDER — OMEPRAZOLE 40 MG/1
CAPSULE, DELAYED RELEASE ORAL
Qty: 90 CAPSULE | Refills: 0 | Status: SHIPPED | OUTPATIENT
Start: 2021-07-29 | End: 2022-01-18

## 2021-07-29 RX ORDER — NORGESTIMATE AND ETHINYL ESTRADIOL 7DAYSX3 28
1 KIT ORAL DAILY
Qty: 84 TABLET | Refills: 0 | Status: SHIPPED | OUTPATIENT
Start: 2021-07-29 | End: 2021-10-26

## 2021-07-29 NOTE — TELEPHONE ENCOUNTER
Prescription approved per [facility] Refill Protocol.  Stacy Ferrari RN

## 2021-10-11 ENCOUNTER — HEALTH MAINTENANCE LETTER (OUTPATIENT)
Age: 36
End: 2021-10-11

## 2021-10-22 ENCOUNTER — VIRTUAL VISIT (OUTPATIENT)
Dept: FAMILY MEDICINE | Facility: CLINIC | Age: 36
End: 2021-10-22
Payer: COMMERCIAL

## 2021-10-22 DIAGNOSIS — F41.9 ANXIETY: Primary | ICD-10-CM

## 2021-10-22 PROCEDURE — 99213 OFFICE O/P EST LOW 20 MIN: CPT | Mod: TEL | Performed by: PHYSICIAN ASSISTANT

## 2021-10-22 RX ORDER — BUPROPION HYDROCHLORIDE 150 MG/1
150 TABLET ORAL EVERY MORNING
Qty: 90 TABLET | Refills: 1 | Status: SHIPPED | OUTPATIENT
Start: 2021-10-22 | End: 2022-05-23

## 2021-10-22 NOTE — PATIENT INSTRUCTIONS
To schedule the copper IUD (Paraguard) placement, call clinic and request any of the following providers:     Dr. Simona Morgan

## 2021-10-22 NOTE — PROGRESS NOTES
"Tessy is a 36 year old who is being evaluated via a billable telephone visit.      What phone number would you like to be contacted at?   How would you like to obtain your AVS? Carlota    Assessment & Plan     (F41.9) Anxiety  (primary encounter diagnosis)  Comment: stop zoloft. Trial of wellbutrin  Discussed that it is possible another selective serotonin reuptake inhibitor would work well for her anxiety without impacting the libido but also that there is no guarantee. Discussed wellbutrin, possible side effects and how we would expect it to help. She wanted to try this (as well as change birth control) to see how libido is and then if not working would consider another medication  Plan: buPROPion (WELLBUTRIN XL) 150 MG 24 hr tablet    Given names of providers who place the IUDs in clinic  We reviewed how the IUD's work - specifically copper (paraguard) vs progesterone (mirena)  She is trying to avoid hormones so interested in the copper  Discussed the possibility of periods becoming heavier and some more cramping but that this can often be resolved or reduced with NSAIDs at time of period            BMI:   Estimated body mass index is 30.55 kg/m  as calculated from the following:    Height as of 3/31/21: 1.556 m (5' 1.25\").    Weight as of 3/31/21: 73.9 kg (163 lb).     Return in about 4 weeks (around 11/19/2021) for new medication follow up.    Baylee Soria PA-C  [facility]    Brendan Still is a 36 year old who presents for the following health issues     HPI       Medication Followup of Zoloft 50 mg tablet     Taking Medication as prescribed: yes    Side Effects:  Low libido     Medication Helping Symptoms:  yes     -She would also like to discuss getting a copper IUD.       Noticing a drop in libido that is really starting to bother her  Knows it could be the zoloft or birth control  Would like to try the copper IUD and         Review of Systems   Remainder of ROS obtained and " found to be negative other than that which was documented above        Objective           Vitals:  No vitals were obtained today due to virtual visit.    Physical Exam   healthy, alert and no distress  PSYCH: Alert and oriented times 3; coherent speech, normal   rate and volume, able to articulate logical thoughts, able   to abstract reason, no tangential thoughts, no hallucinations   or delusions  Her affect is normal  RESP: No cough, no audible wheezing, able to talk in full sentences  Remainder of exam unable to be completed due to telephone visits        Phone call duration: 5 minutes

## 2021-10-26 DIAGNOSIS — Z00.00 ROUTINE GENERAL MEDICAL EXAMINATION AT A HEALTH CARE FACILITY: ICD-10-CM

## 2021-10-26 RX ORDER — NORGESTIMATE AND ETHINYL ESTRADIOL 7DAYSX3 28
1 KIT ORAL DAILY
Qty: 84 TABLET | Refills: 0 | Status: SHIPPED | OUTPATIENT
Start: 2021-10-26 | End: 2022-01-18

## 2021-11-25 ENCOUNTER — MYC REFILL (OUTPATIENT)
Dept: FAMILY MEDICINE | Facility: CLINIC | Age: 36
End: 2021-11-25
Payer: COMMERCIAL

## 2021-11-25 DIAGNOSIS — F41.9 ANXIETY: ICD-10-CM

## 2021-12-05 ENCOUNTER — HEALTH MAINTENANCE LETTER (OUTPATIENT)
Age: 36
End: 2021-12-05

## 2022-01-17 ASSESSMENT — ENCOUNTER SYMPTOMS
HEMATURIA: 0
ABDOMINAL PAIN: 0
ARTHRALGIAS: 0
FEVER: 0
HEARTBURN: 0
EYE PAIN: 0
NERVOUS/ANXIOUS: 0
DIZZINESS: 0
PALPITATIONS: 0
DIARRHEA: 0
HEADACHES: 0
PARESTHESIAS: 0
NAUSEA: 0
JOINT SWELLING: 0
BREAST MASS: 0
MYALGIAS: 0
FREQUENCY: 0
COUGH: 0
HEMATOCHEZIA: 0
SORE THROAT: 0
CONSTIPATION: 0
CHILLS: 0
WEAKNESS: 0
SHORTNESS OF BREATH: 0
DYSURIA: 0

## 2022-01-18 ENCOUNTER — OFFICE VISIT (OUTPATIENT)
Dept: FAMILY MEDICINE | Facility: CLINIC | Age: 37
End: 2022-01-18
Payer: COMMERCIAL

## 2022-01-18 VITALS
SYSTOLIC BLOOD PRESSURE: 116 MMHG | BODY MASS INDEX: 33.99 KG/M2 | OXYGEN SATURATION: 96 % | HEIGHT: 61 IN | WEIGHT: 180 LBS | TEMPERATURE: 97.5 F | DIASTOLIC BLOOD PRESSURE: 68 MMHG | HEART RATE: 68 BPM

## 2022-01-18 DIAGNOSIS — F41.9 ANXIETY: ICD-10-CM

## 2022-01-18 DIAGNOSIS — Z30.09 ENCOUNTER FOR OTHER GENERAL COUNSELING OR ADVICE ON CONTRACEPTION: ICD-10-CM

## 2022-01-18 DIAGNOSIS — Z13.6 CARDIOVASCULAR SCREENING; LDL GOAL LESS THAN 160: ICD-10-CM

## 2022-01-18 DIAGNOSIS — Z00.00 ROUTINE GENERAL MEDICAL EXAMINATION AT A HEALTH CARE FACILITY: Primary | ICD-10-CM

## 2022-01-18 LAB
CHOLEST SERPL-MCNC: 180 MG/DL
FASTING STATUS PATIENT QL REPORTED: YES
FASTING STATUS PATIENT QL REPORTED: YES
GLUCOSE BLD-MCNC: 80 MG/DL (ref 70–99)
HDLC SERPL-MCNC: 90 MG/DL
LDLC SERPL CALC-MCNC: 65 MG/DL
NONHDLC SERPL-MCNC: 90 MG/DL
TRIGL SERPL-MCNC: 125 MG/DL

## 2022-01-18 PROCEDURE — 80061 LIPID PANEL: CPT | Performed by: PHYSICIAN ASSISTANT

## 2022-01-18 PROCEDURE — 82947 ASSAY GLUCOSE BLOOD QUANT: CPT | Performed by: PHYSICIAN ASSISTANT

## 2022-01-18 PROCEDURE — 36415 COLL VENOUS BLD VENIPUNCTURE: CPT | Performed by: PHYSICIAN ASSISTANT

## 2022-01-18 PROCEDURE — 99395 PREV VISIT EST AGE 18-39: CPT | Performed by: PHYSICIAN ASSISTANT

## 2022-01-18 RX ORDER — NORGESTIMATE AND ETHINYL ESTRADIOL 7DAYSX3 28
1 KIT ORAL DAILY
Qty: 84 TABLET | Refills: 0 | Status: SHIPPED | OUTPATIENT
Start: 2022-01-18 | End: 2022-04-20

## 2022-01-18 ASSESSMENT — MIFFLIN-ST. JEOR: SCORE: 1452.81

## 2022-01-18 ASSESSMENT — PAIN SCALES - GENERAL: PAINLEVEL: NO PAIN (0)

## 2022-01-18 NOTE — PROGRESS NOTES
SUBJECTIVE:   CC: Tessy Garcia is an 36 year old woman who presents for preventive health visit.       Patient has been advised of split billing requirements and indicates understanding: Yes  Healthy Habits:     Getting at least 3 servings of Calcium per day:  NO    Bi-annual eye exam:  NO    Dental care twice a year:  NO    Sleep apnea or symptoms of sleep apnea:  None    Diet:  Regular (no restrictions)    Frequency of exercise:  4-5 days/week    Duration of exercise:  15-30 minutes    Taking medications regularly:  Yes    Medication side effects:  None    PHQ-2 Total Score: 0    Additional concerns today:  No      Never started the wellbutrin  Feels her mental health is good but still struggling with the decreased libido with zoloft so ready to make the change    Would also like to get off birth control  Interested in the IUD (copper) as she wants something without hormones  Has been on birth control since age 16 to regulate periods. Never had issues with heavy cramping or bleeding  Other than pregnancies has been on birth control since then    Today's PHQ-2 Score:   PHQ-2 ( 1999 Pfizer) 1/17/2022   Q1: Little interest or pleasure in doing things 0   Q2: Feeling down, depressed or hopeless 0   PHQ-2 Score 0   PHQ-2 Total Score (12-17 Years)- Positive if 3 or more points; Administer PHQ-A if positive -   Q1: Little interest or pleasure in doing things Not at all   Q2: Feeling down, depressed or hopeless Not at all   PHQ-2 Score 0       Abuse: Current or Past (Physical, Sexual or Emotional) - No  Do you feel safe in your environment? Yes      Social History     Tobacco Use     Smoking status: Never Smoker     Smokeless tobacco: Never Used   Substance Use Topics     Alcohol use: Yes         Alcohol Use 1/17/2022   Prescreen: >3 drinks/day or >7 drinks/week? No       Reviewed orders with patient.  Reviewed health maintenance and updated orders accordingly - Yes  BP Readings from Last 3 Encounters:   01/18/22  "116/68   03/31/21 113/61   11/02/20 126/70    Wt Readings from Last 3 Encounters:   01/18/22 81.6 kg (180 lb)   03/31/21 73.9 kg (163 lb)   11/02/20 79.8 kg (176 lb)                    Breast Cancer Screening:    Breast CA Risk Assessment (FHS-7) 1/17/2022   Do you have a family history of breast, colon, or ovarian cancer? No / Unknown           Pertinent mammograms are reviewed under the imaging tab.    History of abnormal Pap smear: NO - age 30-65 PAP every 5 years with negative HPV co-testing recommended     Reviewed and updated as needed this visit by clinical staff  Tobacco  Allergies  Meds   Med Hx  Surg Hx  Fam Hx  Soc Hx       Reviewed and updated as needed this visit by Provider                   Review of Systems  CONSTITUTIONAL: NEGATIVE for fever, chills, change in weight  INTEGUMENTARU/SKIN: NEGATIVE for worrisome rashes, moles or lesions  EYES: NEGATIVE for vision changes or irritation  ENT: NEGATIVE for ear, mouth and throat problems  RESP: NEGATIVE for significant cough or SOB  BREAST: NEGATIVE for masses, tenderness or discharge  CV: NEGATIVE for chest pain, palpitations or peripheral edema  GI: NEGATIVE for nausea, abdominal pain, heartburn, or change in bowel habits  : NEGATIVE for unusual urinary or vaginal symptoms. Periods are regular.  MUSCULOSKELETAL: NEGATIVE for significant arthralgias or myalgia  NEURO: NEGATIVE for weakness, dizziness or paresthesias  PSYCHIATRIC: NEGATIVE for changes in mood or affect     OBJECTIVE:   /68   Pulse 68   Temp 97.5  F (36.4  C) (Tympanic)   Ht 1.556 m (5' 1.25\")   Wt 81.6 kg (180 lb)   SpO2 96%   BMI 33.73 kg/m    Physical Exam  GENERAL: healthy, alert and no distress  EYES: Eyes grossly normal to inspection, PERRL and conjunctivae and sclerae normal  HENT: ear canals and TM's normal, nose and mouth without ulcers or lesions  NECK: no adenopathy, no asymmetry, masses, or scars and thyroid normal to palpation  RESP: lungs clear to " "auscultation - no rales, rhonchi or wheezes  BREAST: patient declined  CV: regular rate and rhythm, normal S1 S2, no S3 or S4, no murmur, click or rub, no peripheral edema and peripheral pulses strong  ABDOMEN: soft, nontender, no hepatosplenomegaly, no masses and bowel sounds normal  MS: no gross musculoskeletal defects noted, no edema  SKIN: no suspicious lesions or rashes  NEURO: Normal strength and tone, mentation intact and speech normal  PSYCH: mentation appears normal, affect normal/bright    Diagnostic Test Results:  pending    ASSESSMENT/PLAN:   (Z00.00) Routine general medical examination at a health care facility  (primary encounter diagnosis)  Comment:   Plan: Lipid panel reflex to direct LDL Fasting,         Glucose, norgestim-eth estrad triphasic (ORTHO         TRI-CYCLEN) 0.18/0.215/0.25 MG-35 MCG tablet            (Z13.6) CARDIOVASCULAR SCREENING; LDL GOAL LESS THAN 160  Comment:   Plan: Lipid panel reflex to direct LDL Fasting,         Glucose            (Z30.09) Encounter for other general counseling or advice on contraception  Comment: currently on the pill - refilled until she is able to get an appointment for the IUD placement. Interested in the copper IUD. Discussed possible side effects, concerns, etc.  Plan: patient will call and schedule IUD placement     (F41.9) Anxiety  Comment: doing well - feels she is in a good place  Plan: will try wellbutrin in place of zoloft given side effects from zoloft      Patient has been advised of split billing requirements and indicates understanding: Yes  COUNSELING:  Reviewed preventive health counseling, as reflected in patient instructions    Estimated body mass index is 33.73 kg/m  as calculated from the following:    Height as of this encounter: 1.556 m (5' 1.25\").    Weight as of this encounter: 81.6 kg (180 lb).        She reports that she has never smoked. She has never used smokeless tobacco.      Counseling Resources:  ATP IV Guidelines  Pooled " Cohorts Equation Calculator  Breast Cancer Risk Calculator  BRCA-Related Cancer Risk Assessment: FHS-7 Tool  FRAX Risk Assessment  ICSI Preventive Guidelines  Dietary Guidelines for Americans, 2010  USDA's MyPlate  ASA Prophylaxis  Lung CA Screening    MARVIN Oconnell [facility]

## 2022-02-15 ENCOUNTER — MYC MEDICAL ADVICE (OUTPATIENT)
Dept: FAMILY MEDICINE | Facility: CLINIC | Age: 37
End: 2022-02-15
Payer: COMMERCIAL

## 2022-02-15 DIAGNOSIS — K29.70 GASTRITIS WITHOUT BLEEDING, UNSPECIFIED CHRONICITY, UNSPECIFIED GASTRITIS TYPE: ICD-10-CM

## 2022-02-16 RX ORDER — OMEPRAZOLE 40 MG/1
40 CAPSULE, DELAYED RELEASE ORAL DAILY
Qty: 90 CAPSULE | Refills: 0 | Status: SHIPPED | OUTPATIENT
Start: 2022-02-16 | End: 2022-05-22

## 2022-02-16 RX ORDER — FAMOTIDINE 20 MG/1
20 TABLET, FILM COATED ORAL AT BEDTIME
Qty: 90 TABLET | Refills: 0 | Status: SHIPPED | OUTPATIENT
Start: 2022-02-16 | End: 2022-12-13

## 2022-02-16 NOTE — TELEPHONE ENCOUNTER
"Baylee: Please see patient's Joannhart note on this encounter. Tessy reports that symptoms started on 2/14/22.   \"Lots of indigestion, burping, and nausea.\" Symptoms worse after eating. She has not been eating much.   \"Wakes up at night with a panicky feeling like I did about a year ago.\"  \"very similar to what I have had in the past from Dec 2020 to March 2021. \"  She took 40 mg omeprazole in the morning and a pepcid in the evening; starting March 31st.  She took them and weaned off of them. Last taken early July 2021.   Denies blood in urine or stool.  Urinating OK and having bowel movements as usual.  Patient prefers an answer by Carlota.  Thank you.  Jose Morton RN            "

## 2022-04-19 DIAGNOSIS — Z00.00 ROUTINE GENERAL MEDICAL EXAMINATION AT A HEALTH CARE FACILITY: ICD-10-CM

## 2022-04-20 DIAGNOSIS — Z00.00 ROUTINE GENERAL MEDICAL EXAMINATION AT A HEALTH CARE FACILITY: ICD-10-CM

## 2022-04-20 RX ORDER — NORGESTIMATE AND ETHINYL ESTRADIOL 7DAYSX3 28
1 KIT ORAL DAILY
Qty: 84 TABLET | Refills: 3 | Status: SHIPPED | OUTPATIENT
Start: 2022-04-20 | End: 2023-04-03

## 2022-04-20 NOTE — TELEPHONE ENCOUNTER
Routing refill request to provider for review/approval because:  PCP to determine refill - BCP on med list does not match requested med    David Newton RN

## 2022-04-21 RX ORDER — NORGESTIMATE AND ETHINYL ESTRADIOL 7DAYSX3 28
KIT ORAL
Qty: 84 TABLET | Refills: 0 | OUTPATIENT
Start: 2022-04-21

## 2022-05-22 ENCOUNTER — MYC MEDICAL ADVICE (OUTPATIENT)
Dept: FAMILY MEDICINE | Facility: CLINIC | Age: 37
End: 2022-05-22
Payer: COMMERCIAL

## 2022-05-22 ENCOUNTER — MYC REFILL (OUTPATIENT)
Dept: FAMILY MEDICINE | Facility: CLINIC | Age: 37
End: 2022-05-22
Payer: COMMERCIAL

## 2022-05-22 DIAGNOSIS — F41.9 ANXIETY: ICD-10-CM

## 2022-05-22 DIAGNOSIS — K29.70 GASTRITIS WITHOUT BLEEDING, UNSPECIFIED CHRONICITY, UNSPECIFIED GASTRITIS TYPE: ICD-10-CM

## 2022-05-23 RX ORDER — OMEPRAZOLE 40 MG/1
40 CAPSULE, DELAYED RELEASE ORAL DAILY
Qty: 90 CAPSULE | Refills: 1 | Status: SHIPPED | OUTPATIENT
Start: 2022-05-23 | End: 2022-12-13

## 2022-05-23 RX ORDER — BUPROPION HYDROCHLORIDE 150 MG/1
150 TABLET ORAL EVERY MORNING
Qty: 90 TABLET | Refills: 1 | Status: SHIPPED | OUTPATIENT
Start: 2022-05-23 | End: 2022-12-13

## 2022-06-29 ENCOUNTER — TRANSFERRED RECORDS (OUTPATIENT)
Dept: HEALTH INFORMATION MANAGEMENT | Facility: CLINIC | Age: 37
End: 2022-06-29

## 2022-08-10 DIAGNOSIS — R68.82 DECREASED LIBIDO: Primary | ICD-10-CM

## 2022-08-29 ENCOUNTER — LAB (OUTPATIENT)
Dept: LAB | Facility: CLINIC | Age: 37
End: 2022-08-29
Payer: COMMERCIAL

## 2022-08-29 DIAGNOSIS — R68.82 DECREASED LIBIDO: ICD-10-CM

## 2022-08-29 LAB
HBA1C MFR BLD: 5 % (ref 0–5.6)
PROLACTIN SERPL 3RD IS-MCNC: 30 NG/ML (ref 5–23)
TSH SERPL DL<=0.005 MIU/L-ACNC: 2.99 MU/L (ref 0.4–4)

## 2022-08-29 PROCEDURE — 36415 COLL VENOUS BLD VENIPUNCTURE: CPT

## 2022-08-29 PROCEDURE — 83036 HEMOGLOBIN GLYCOSYLATED A1C: CPT

## 2022-08-29 PROCEDURE — 84146 ASSAY OF PROLACTIN: CPT

## 2022-08-29 PROCEDURE — 84443 ASSAY THYROID STIM HORMONE: CPT

## 2022-09-04 ENCOUNTER — MYC MEDICAL ADVICE (OUTPATIENT)
Dept: FAMILY MEDICINE | Facility: CLINIC | Age: 37
End: 2022-09-04

## 2022-09-06 ENCOUNTER — E-VISIT (OUTPATIENT)
Dept: FAMILY MEDICINE | Facility: CLINIC | Age: 37
End: 2022-09-06
Payer: COMMERCIAL

## 2022-09-06 DIAGNOSIS — F41.9 ANXIETY: Primary | ICD-10-CM

## 2022-09-06 PROCEDURE — 99421 OL DIG E/M SVC 5-10 MIN: CPT | Performed by: PHYSICIAN ASSISTANT

## 2022-09-06 RX ORDER — ESCITALOPRAM OXALATE 10 MG/1
10 TABLET ORAL DAILY
Qty: 90 TABLET | Refills: 0 | Status: SHIPPED | OUTPATIENT
Start: 2022-09-06 | End: 2022-12-13

## 2022-09-06 ASSESSMENT — ANXIETY QUESTIONNAIRES
3. WORRYING TOO MUCH ABOUT DIFFERENT THINGS: MORE THAN HALF THE DAYS
1. FEELING NERVOUS, ANXIOUS, OR ON EDGE: MORE THAN HALF THE DAYS
GAD7 TOTAL SCORE: 8
GAD7 TOTAL SCORE: 8
4. TROUBLE RELAXING: MORE THAN HALF THE DAYS
2. NOT BEING ABLE TO STOP OR CONTROL WORRYING: MORE THAN HALF THE DAYS
5. BEING SO RESTLESS THAT IT IS HARD TO SIT STILL: NOT AT ALL
GAD7 TOTAL SCORE: 8
7. FEELING AFRAID AS IF SOMETHING AWFUL MIGHT HAPPEN: NOT AT ALL
7. FEELING AFRAID AS IF SOMETHING AWFUL MIGHT HAPPEN: NOT AT ALL
8. IF YOU CHECKED OFF ANY PROBLEMS, HOW DIFFICULT HAVE THESE MADE IT FOR YOU TO DO YOUR WORK, TAKE CARE OF THINGS AT HOME, OR GET ALONG WITH OTHER PEOPLE?: SOMEWHAT DIFFICULT
6. BECOMING EASILY ANNOYED OR IRRITABLE: NOT AT ALL

## 2022-09-07 ASSESSMENT — ANXIETY QUESTIONNAIRES: GAD7 TOTAL SCORE: 8

## 2022-09-24 ENCOUNTER — HEALTH MAINTENANCE LETTER (OUTPATIENT)
Age: 37
End: 2022-09-24

## 2022-12-13 ENCOUNTER — VIRTUAL VISIT (OUTPATIENT)
Dept: FAMILY MEDICINE | Facility: CLINIC | Age: 37
End: 2022-12-13
Payer: COMMERCIAL

## 2022-12-13 DIAGNOSIS — K29.70 GASTRITIS WITHOUT BLEEDING, UNSPECIFIED CHRONICITY, UNSPECIFIED GASTRITIS TYPE: ICD-10-CM

## 2022-12-13 DIAGNOSIS — F41.9 ANXIETY: ICD-10-CM

## 2022-12-13 PROCEDURE — 99213 OFFICE O/P EST LOW 20 MIN: CPT | Mod: TEL | Performed by: PHYSICIAN ASSISTANT

## 2022-12-13 RX ORDER — LORAZEPAM 0.5 MG/1
0.5 TABLET ORAL EVERY 6 HOURS PRN
Qty: 20 TABLET | Refills: 0 | Status: SHIPPED | OUTPATIENT
Start: 2022-12-13

## 2022-12-13 RX ORDER — OMEPRAZOLE 40 MG/1
40 CAPSULE, DELAYED RELEASE ORAL DAILY
Qty: 90 CAPSULE | Refills: 1 | Status: SHIPPED | OUTPATIENT
Start: 2022-12-13

## 2022-12-13 RX ORDER — ESCITALOPRAM OXALATE 10 MG/1
10 TABLET ORAL DAILY
Qty: 90 TABLET | Refills: 3 | Status: SHIPPED | OUTPATIENT
Start: 2022-12-13 | End: 2024-02-12

## 2022-12-13 NOTE — PROGRESS NOTES
"Tessy is a 37 year old who is being evaluated via a billable telephone visit.      What phone number would you like to be contacted at? [phone]  How would you like to obtain your AVS? Carlota  Distant Location (provider location):  On-site    Assessment & Plan     (F41.9) Anxiety  Comment: refill lexapo. Discussed use of ativan for the 'emergency' or travel situations. I think/hope that just knowing she has that to use if needed will help calm some of the anxiety while traveling. Discussed possible side effects and medication interactions (specifically with alcohol)  Plan: LORazepam (ATIVAN) 0.5 MG tablet, escitalopram         (LEXAPRO) 10 MG tablet            (K29.70) Gastritis without bleeding, unspecified chronicity, unspecified gastritis type  Comment: refilled - recently restarted. Uses intermittently when stomach seems to be acting up  Plan: omeprazole (PRILOSEC) 40 MG DR capsule                     BMI:   Estimated body mass index is 33.73 kg/m  as calculated from the following:    Height as of 1/18/22: 1.556 m (5' 1.25\").    Weight as of 1/18/22: 81.6 kg (180 lb).           Return in about 3 months (around 3/13/2023) for Physical Exam.    Baylee Soria PA-C  [facility]    Subjective   Tessy is a 37 year old, presenting for the following health issues:  No chief complaint on file.      HPI   ANXIETY    Had been on zoloft and was working well but was still struggling with traveling/trips - was needing to go ome in the middle of trips because she would get really anxious and feel panicked  Changed to a different medication (one that her mom is on) with lexapro and works 99% of the time    This summer went camping along the [place] but in the middle of the trip anxiety got high and they had to pack up early and go home  Thinks it relates to her stomach history and fear of having a major issue while traveling  Had decided that she just wasn't going to travel anymore but was " invited to go to [place] with friends with the friends paying for everythign so didn't feel she could turn this down      Review of Systems   Remainder of ROS obtained and found to be negative other than that which was documented above        Objective           Vitals:  No vitals were obtained today due to virtual visit.    Physical Exam   healthy, alert and no distress  PSYCH: Alert and oriented times 3; coherent speech, normal   rate and volume, able to articulate logical thoughts, able   to abstract reason, no tangential thoughts, no hallucinations   or delusions  Her affect is normal  RESP: No cough, no audible wheezing, able to talk in full sentences  Remainder of exam unable to be completed due to telephone visits                Phone call duration: 11 minutes  Total visit with documentation and chart review: 16 minutes

## 2023-02-10 ENCOUNTER — OFFICE VISIT (OUTPATIENT)
Dept: URGENT CARE | Facility: CLINIC | Age: 38
End: 2023-02-10
Payer: COMMERCIAL

## 2023-02-10 VITALS
BODY MASS INDEX: 47.38 KG/M2 | DIASTOLIC BLOOD PRESSURE: 98 MMHG | SYSTOLIC BLOOD PRESSURE: 141 MMHG | OXYGEN SATURATION: 97 % | WEIGHT: 276 LBS | TEMPERATURE: 98 F | RESPIRATION RATE: 17 BRPM | HEART RATE: 78 BPM

## 2023-02-10 DIAGNOSIS — R05.1 ACUTE COUGH: ICD-10-CM

## 2023-02-10 DIAGNOSIS — Z87.09: ICD-10-CM

## 2023-02-10 DIAGNOSIS — J01.20 ACUTE ETHMOIDAL SINUSITIS, RECURRENCE NOT SPECIFIED: Primary | ICD-10-CM

## 2023-02-10 DIAGNOSIS — R09.81 CONGESTION OF NASAL SINUS: ICD-10-CM

## 2023-02-10 DIAGNOSIS — J02.9 SORE THROAT: ICD-10-CM

## 2023-02-10 LAB
CTP QC/QA: YES
SARS-COV-2 AG RESP QL IA.RAPID: NEGATIVE

## 2023-02-10 PROCEDURE — 1159F MED LIST DOCD IN RCRD: CPT | Mod: CPTII,S$GLB,,

## 2023-02-10 PROCEDURE — 3080F PR MOST RECENT DIASTOLIC BLOOD PRESSURE >= 90 MM HG: ICD-10-PCS | Mod: CPTII,S$GLB,,

## 2023-02-10 PROCEDURE — 87811 SARS-COV-2 COVID19 W/OPTIC: CPT | Mod: QW,S$GLB,,

## 2023-02-10 PROCEDURE — 99214 PR OFFICE/OUTPT VISIT, EST, LEVL IV, 30-39 MIN: ICD-10-PCS | Mod: S$GLB,,,

## 2023-02-10 PROCEDURE — 1160F RVW MEDS BY RX/DR IN RCRD: CPT | Mod: CPTII,S$GLB,,

## 2023-02-10 PROCEDURE — 3080F DIAST BP >= 90 MM HG: CPT | Mod: CPTII,S$GLB,,

## 2023-02-10 PROCEDURE — 3008F BODY MASS INDEX DOCD: CPT | Mod: CPTII,S$GLB,,

## 2023-02-10 PROCEDURE — 3008F PR BODY MASS INDEX (BMI) DOCUMENTED: ICD-10-PCS | Mod: CPTII,S$GLB,,

## 2023-02-10 PROCEDURE — 87811 SARS CORONAVIRUS 2 ANTIGEN POCT, MANUAL READ: ICD-10-PCS | Mod: QW,S$GLB,,

## 2023-02-10 PROCEDURE — 1160F PR REVIEW ALL MEDS BY PRESCRIBER/CLIN PHARMACIST DOCUMENTED: ICD-10-PCS | Mod: CPTII,S$GLB,,

## 2023-02-10 PROCEDURE — 1159F PR MEDICATION LIST DOCUMENTED IN MEDICAL RECORD: ICD-10-PCS | Mod: CPTII,S$GLB,,

## 2023-02-10 PROCEDURE — 3077F SYST BP >= 140 MM HG: CPT | Mod: CPTII,S$GLB,,

## 2023-02-10 PROCEDURE — 3077F PR MOST RECENT SYSTOLIC BLOOD PRESSURE >= 140 MM HG: ICD-10-PCS | Mod: CPTII,S$GLB,,

## 2023-02-10 PROCEDURE — 99214 OFFICE O/P EST MOD 30 MIN: CPT | Mod: S$GLB,,,

## 2023-02-10 RX ORDER — PREDNISONE 20 MG/1
20 TABLET ORAL DAILY
Qty: 5 TABLET | Refills: 0 | Status: SHIPPED | OUTPATIENT
Start: 2023-02-10 | End: 2023-02-15

## 2023-02-10 RX ORDER — AMOXICILLIN AND CLAVULANATE POTASSIUM 875; 125 MG/1; MG/1
1 TABLET, FILM COATED ORAL 2 TIMES DAILY
Qty: 14 TABLET | Refills: 0 | Status: SHIPPED | OUTPATIENT
Start: 2023-02-10 | End: 2023-02-17

## 2023-02-10 RX ORDER — MULTIVITAMIN
1 TABLET ORAL
COMMUNITY
End: 2023-04-20

## 2023-02-10 RX ORDER — BENZONATATE 200 MG/1
200 CAPSULE ORAL 3 TIMES DAILY PRN
Qty: 21 CAPSULE | Refills: 0 | Status: SHIPPED | OUTPATIENT
Start: 2023-02-10 | End: 2023-02-17

## 2023-02-10 RX ORDER — ONDANSETRON 4 MG/1
4 TABLET, FILM COATED ORAL EVERY 4 HOURS
COMMUNITY
Start: 2023-01-24

## 2023-02-10 NOTE — PATIENT INSTRUCTIONS
Take antibiotics to completion  Take steroids as directed  Take tessalon pearls as needed for persistent cough  Continue to use nasal spray for nasal congestion, runny nose, and post nasal drip      SORE THROAT:    You may gargle with hot salt water 4 times a day for the next 2 days and then you may also gargle diluted hydrogen peroxide once to twice daily to alleviate some of your throat discomfort.  Drink plenty of fluids, recommend warm tea with honey.     YOU MAY USE OVER-THE-COUNTER CEPACOL FOR SOOTHING OF YOUR THROAT.  You may wish to avoid spicy food, citrus fruits, and red sauces- as this may irritate the throat more.    You can also take a daily anti-histamine such as Zyrtec, Claritin, Xyzal, OR Allegra-IN DAYTIME; NON DROWSY) AND/OR Benadryl- AT NIGHT; DROWSY) to help with runny nose/sneezing/sore throat/cough.    If your symptoms do not improve, you should return to this clinic. If your symptoms worsen, go to the emergency room.     ORAL STEROIDS   A short course of prednisone or methylprednisolone will almost certainly make you feel better. Steroids boast your energy level, alleviate pain and nausea, block allergies, reduce swelling, shrink nasal polyps, alleviate asthma, and can even restore hearing in some patients with sudden deafness. However, steroids must be used with caution, because they can have significant addictive potential and cause serious side effects - especially with long-term use. For this reason, oral or systemic steroids are reserved for the most urgent uses, and TOPICAL or LOCAL steroids are preferred.     RISKS OF SYSTEMIC STEROIDS     Steroids are the most effective anti-inflammatory drugs available, and are derivatives of natural hormones which the body creates to help the body cope with injury or stress.  However, prolonged use of oral or systemic steroids can result in suppression of normal steroid levels in the body.  Therefore, these medications should be taken exactly as  prescribed, usually in a gradually decreasing dose, to avoid sudden withdrawal.  Withdrawal symptoms are uncommon in patients who have used steroids for less than two weeks at a time.  Continued or repeated use of steroids can reduce your ability to fight infection and can result in weight gain, fluid retention, acne, increased body hair, purple marks on the abdomen, collection of fatty deposits under the skin, and easy bruising.  High doses of steroids will frequently cause nervousness, sleeplessness, excitation, and sometimes depression or confusion.  Steroids can also cause elevation of blood sugar or blood pressure or change in salt balance.  Prolonged steroids can cause thinning of the bones, muscle weakness, glaucoma, and cataracts.  They can aggravate ulcers.  Patients who are pregnant, have a history of stomach ulcers, glaucoma, diabetes, high blood pressure, tuberculosis, osteoporosis, or recent vaccination, should not take steroids unless absolutely necessary.  A very rare complication of steroids is interruption of the blood supply to the hip bone which can result in a fracture that requires a hip replacement.   Fortunately, all of these complications are extremely rare in patients treated with short-term doses of steroids.  If your doctor has prescribed systemic steroids, he or she has likely judged that the risk of these complications is outweighed by the potential benefit for the treatment of your disease.  If you have any questions about this information or the instructions on how to take your steroids, please speak with your doctor before you begin the medication.   Alternatives to systemic steroids include topical applications to the nose, skin, lung or ear, so that the systemic dose - that which distributes through the body - is greatly reduced. Topical steroids greatly reduce the risk of prolonged use of steroids.       - You must understand that you have received an Urgent Care treatment only and  that you may be released before all of your medical problems are known or treated.   - You, the patient, will arrange for follow up care as instructed with your primary care provider or recommended specialist.   - If your condition worsens or fails to improve we recommend that you receive another evaluation at the ER immediately or contact your PCP to discuss your concerns, or return here.   - Please do not drive or make any important decisions for 24 hours if you have received any pain medications, sedatives or mood altering drugs during your visit.    Disclaimer: This document was drafted with the use of a voice recognition device and is likely to have sound alike errors.

## 2023-02-10 NOTE — PROGRESS NOTES
Subjective:       Patient ID: Anette Villa is a 37 y.o. female.    Vitals:  weight is 125.2 kg (276 lb). Her temperature is 98.2 °F (36.8 °C). Her blood pressure is 141/98 (abnormal) and her pulse is 78. Her respiration is 17 and oxygen saturation is 97%.     Chief Complaint: URI    38 yo female with recent hx of bacterial sinusitis presents with similar symptoms presenting today. + congestion, bilateral sore throat, chronic nausea, frontal sinus pain, persistent cough not relieved with OTC medication or breathing treatment/inhaler. Tried Mucinex, Dayquil/Nyquil as well. No known sick contacts but recent travel from  of >200 people. No known exposure to strep/mono. Episode of vomiting on Monday, but none today and being treated with Zofran. Denies diarrhea or abdominal pain or urinary symptoms. Allergic to cipro, sulfa, and lisonpril.    URI   This is a new problem. The current episode started in the past 7 days. There has been no fever. Associated symptoms include congestion, coughing, nausea, sinus pain, a sore throat and vomiting (none today). Pertinent negatives include no abdominal pain, chest pain, diarrhea, dysuria, ear pain, rash or sneezing. She has tried decongestant, antihistamine and acetaminophen for the symptoms. The treatment provided no relief.     Constitution: Negative for chills.   HENT:  Positive for congestion, sinus pain, sinus pressure (frontal) and sore throat. Negative for ear pain, postnasal drip, trouble swallowing and voice change.    Cardiovascular:  Negative for chest pain.   Eyes:  Negative for eye itching and eye redness.   Respiratory:  Positive for cough, sputum production and shortness of breath (increased and needed to use rescue inhaler after coughing episodes).    Gastrointestinal:  Positive for nausea and vomiting (none today). Negative for abdominal pain and diarrhea.   Genitourinary:  Negative for dysuria, frequency, urgency and flank pain.   Skin:  Negative for  rash.   Allergic/Immunologic: Negative for sneezing.     Objective:      Vitals:    02/10/23 0859   BP: (!) 141/98   Pulse: 78   Resp: 17   Temp: 98.2 °F (36.8 °C)       Physical Exam   Constitutional: She is oriented to person, place, and time. She appears well-developed. She is cooperative.  Non-toxic appearance. She does not appear ill. No distress.   HENT:   Head: Normocephalic and atraumatic.   Ears:   Right Ear: Hearing, tympanic membrane, external ear and ear canal normal. Tympanic membrane is not erythematous and not bulging. impacted cerumen  Left Ear: Hearing, tympanic membrane, external ear and ear canal normal. Tympanic membrane is not erythematous and not bulging. impacted cerumen  Nose: Congestion present. No mucosal edema, rhinorrhea or nasal deformity. No epistaxis. Right sinus exhibits frontal sinus tenderness. Right sinus exhibits no maxillary sinus tenderness. Left sinus exhibits frontal sinus tenderness. Left sinus exhibits no maxillary sinus tenderness.   Mouth/Throat: Uvula is midline, oropharynx is clear and moist and mucous membranes are normal. Mucous membranes are moist. No trismus in the jaw. Normal dentition. No uvula swelling. No oropharyngeal exudate, posterior oropharyngeal edema, posterior oropharyngeal erythema or cobblestoning. No tonsillar exudate.   Eyes: Conjunctivae and lids are normal. Right eye exhibits no discharge. Left eye exhibits no discharge. No scleral icterus.   Neck: Trachea normal and phonation normal. Neck supple. No edema present. No erythema present. No neck rigidity present.   Cardiovascular: Normal rate, regular rhythm, normal heart sounds and normal pulses.   Pulmonary/Chest: Effort normal and breath sounds normal. No accessory muscle usage. No tachypnea. No respiratory distress. She has no decreased breath sounds. She has no wheezes. She has no rhonchi. She has no rales.   Abdominal: Normal appearance and bowel sounds are normal. She exhibits no distension.  Soft. There is no abdominal tenderness. There is no rebound, no guarding, no tenderness at McBurney's point, no left CVA tenderness, negative Rovsing's sign and no right CVA tenderness.   Musculoskeletal: Normal range of motion.         General: No deformity. Normal range of motion.   Lymphadenopathy:     She has no cervical adenopathy.   Neurological: She is alert and oriented to person, place, and time. She exhibits normal muscle tone. Coordination normal.   Skin: Skin is warm, dry, intact, not diaphoretic and not pale.   Psychiatric: Her speech is normal and behavior is normal. Judgment and thought content normal.   Nursing note and vitals reviewed.      Assessment:       1. Acute ethmoidal sinusitis, recurrence not specified    2. Sore throat    3. Congestion of nasal sinus    4. Acute cough    5. Personal history of sinusitis          Plan:         Acute ethmoidal sinusitis, recurrence not specified  -     SARS Coronavirus 2 Antigen, POCT Manual Read  -     amoxicillin-clavulanate 875-125mg (AUGMENTIN) 875-125 mg per tablet; Take 1 tablet by mouth 2 (two) times daily. for 7 days  Dispense: 14 tablet; Refill: 0    Sore throat    Congestion of nasal sinus    Acute cough  -     predniSONE (DELTASONE) 20 MG tablet; Take 1 tablet (20 mg total) by mouth once daily. for 5 days  Dispense: 5 tablet; Refill: 0  -     benzonatate (TESSALON) 200 MG capsule; Take 1 capsule (200 mg total) by mouth 3 (three) times daily as needed for Cough.  Dispense: 21 capsule; Refill: 0    Personal history of sinusitis         Patient Instructions   Take antibiotics to completion  Take steroids as directed  Take tessalon pearls as needed for persistent cough  Continue to use nasal spray for nasal congestion, runny nose, and post nasal drip      SORE THROAT:    You may gargle with hot salt water 4 times a day for the next 2 days and then you may also gargle diluted hydrogen peroxide once to twice daily to alleviate some of your throat  discomfort.  Drink plenty of fluids, recommend warm tea with honey.     YOU MAY USE OVER-THE-COUNTER CEPACOL FOR SOOTHING OF YOUR THROAT.  You may wish to avoid spicy food, citrus fruits, and red sauces- as this may irritate the throat more.    You can also take a daily anti-histamine such as Zyrtec, Claritin, Xyzal, OR Allegra-IN DAYTIME; NON DROWSY) AND/OR Benadryl- AT NIGHT; DROWSY) to help with runny nose/sneezing/sore throat/cough.    If your symptoms do not improve, you should return to this clinic. If your symptoms worsen, go to the emergency room.     ORAL STEROIDS   A short course of prednisone or methylprednisolone will almost certainly make you feel better. Steroids boast your energy level, alleviate pain and nausea, block allergies, reduce swelling, shrink nasal polyps, alleviate asthma, and can even restore hearing in some patients with sudden deafness. However, steroids must be used with caution, because they can have significant addictive potential and cause serious side effects - especially with long-term use. For this reason, oral or systemic steroids are reserved for the most urgent uses, and TOPICAL or LOCAL steroids are preferred.     RISKS OF SYSTEMIC STEROIDS     Steroids are the most effective anti-inflammatory drugs available, and are derivatives of natural hormones which the body creates to help the body cope with injury or stress.  However, prolonged use of oral or systemic steroids can result in suppression of normal steroid levels in the body.  Therefore, these medications should be taken exactly as prescribed, usually in a gradually decreasing dose, to avoid sudden withdrawal.  Withdrawal symptoms are uncommon in patients who have used steroids for less than two weeks at a time.  Continued or repeated use of steroids can reduce your ability to fight infection and can result in weight gain, fluid retention, acne, increased body hair, purple marks on the abdomen, collection of fatty  deposits under the skin, and easy bruising.  High doses of steroids will frequently cause nervousness, sleeplessness, excitation, and sometimes depression or confusion.  Steroids can also cause elevation of blood sugar or blood pressure or change in salt balance.  Prolonged steroids can cause thinning of the bones, muscle weakness, glaucoma, and cataracts.  They can aggravate ulcers.  Patients who are pregnant, have a history of stomach ulcers, glaucoma, diabetes, high blood pressure, tuberculosis, osteoporosis, or recent vaccination, should not take steroids unless absolutely necessary.  A very rare complication of steroids is interruption of the blood supply to the hip bone which can result in a fracture that requires a hip replacement.   Fortunately, all of these complications are extremely rare in patients treated with short-term doses of steroids.  If your doctor has prescribed systemic steroids, he or she has likely judged that the risk of these complications is outweighed by the potential benefit for the treatment of your disease.  If you have any questions about this information or the instructions on how to take your steroids, please speak with your doctor before you begin the medication.   Alternatives to systemic steroids include topical applications to the nose, skin, lung or ear, so that the systemic dose - that which distributes through the body - is greatly reduced. Topical steroids greatly reduce the risk of prolonged use of steroids.       - You must understand that you have received an Urgent Care treatment only and that you may be released before all of your medical problems are known or treated.   - You, the patient, will arrange for follow up care as instructed with your primary care provider or recommended specialist.   - If your condition worsens or fails to improve we recommend that you receive another evaluation at the ER immediately or contact your PCP to discuss your concerns, or return  here.   - Please do not drive or make any important decisions for 24 hours if you have received any pain medications, sedatives or mood altering drugs during your visit.    Disclaimer: This document was drafted with the use of a voice recognition device and is likely to have sound alike errors.       Medical Decision Making:   History:   Old Medical Records: I decided to obtain old medical records.  Initial Assessment:   36 yo female with recent hx of bacterial sinusitis presents with similar symptoms presenting today. + congestion, bilateral sore throat, chronic nausea, frontal sinus pain, persistent cough not relieved with OTC medication or breathing treatment/inhaler. Tried Mucinex, Dayquil/Nyquil as well. No known sick contacts but recent travel from  of >200 people. No known exposure to strep/mono. Episode of vomiting on Monday, but none today and being treated with Zofran. Denies diarrhea or abdominal pain or urinary symptoms.   Clinical Tests:   Lab Tests: Ordered and Reviewed       <> Summary of Lab: COVID -  Urgent Care Management:  Refuses Zofran today.  Needs work note.      RTC and ER precautions

## 2023-02-28 ENCOUNTER — OFFICE VISIT (OUTPATIENT)
Dept: ALLERGY | Facility: CLINIC | Age: 38
End: 2023-02-28
Payer: COMMERCIAL

## 2023-02-28 VITALS
HEART RATE: 83 BPM | DIASTOLIC BLOOD PRESSURE: 85 MMHG | SYSTOLIC BLOOD PRESSURE: 126 MMHG | OXYGEN SATURATION: 98 % | WEIGHT: 250.88 LBS | BODY MASS INDEX: 43.06 KG/M2

## 2023-02-28 DIAGNOSIS — H10.403 CHRONIC CONJUNCTIVITIS OF BOTH EYES, UNSPECIFIED CHRONIC CONJUNCTIVITIS TYPE: ICD-10-CM

## 2023-02-28 DIAGNOSIS — R51.9 FACE PAIN: ICD-10-CM

## 2023-02-28 DIAGNOSIS — H35.50 MACULAR DYSTROPHY: ICD-10-CM

## 2023-02-28 DIAGNOSIS — L50.9 HIVES: ICD-10-CM

## 2023-02-28 DIAGNOSIS — J32.9 FREQUENT EPISODES OF SINUSITIS: ICD-10-CM

## 2023-02-28 DIAGNOSIS — F41.9 ANXIETY: ICD-10-CM

## 2023-02-28 DIAGNOSIS — J31.0 CHRONIC RHINITIS: Primary | ICD-10-CM

## 2023-02-28 DIAGNOSIS — J45.20 MILD INTERMITTENT ASTHMA, UNSPECIFIED WHETHER COMPLICATED: ICD-10-CM

## 2023-02-28 PROCEDURE — 99204 PR OFFICE/OUTPT VISIT, NEW, LEVL IV, 45-59 MIN: ICD-10-PCS | Mod: S$GLB,,, | Performed by: STUDENT IN AN ORGANIZED HEALTH CARE EDUCATION/TRAINING PROGRAM

## 2023-02-28 PROCEDURE — 99999 PR PBB SHADOW E&M-EST. PATIENT-LVL IV: CPT | Mod: PBBFAC,,, | Performed by: STUDENT IN AN ORGANIZED HEALTH CARE EDUCATION/TRAINING PROGRAM

## 2023-02-28 PROCEDURE — 1159F MED LIST DOCD IN RCRD: CPT | Mod: CPTII,S$GLB,, | Performed by: STUDENT IN AN ORGANIZED HEALTH CARE EDUCATION/TRAINING PROGRAM

## 2023-02-28 PROCEDURE — 99999 PR PBB SHADOW E&M-EST. PATIENT-LVL IV: ICD-10-PCS | Mod: PBBFAC,,, | Performed by: STUDENT IN AN ORGANIZED HEALTH CARE EDUCATION/TRAINING PROGRAM

## 2023-02-28 PROCEDURE — 3008F PR BODY MASS INDEX (BMI) DOCUMENTED: ICD-10-PCS | Mod: CPTII,S$GLB,, | Performed by: STUDENT IN AN ORGANIZED HEALTH CARE EDUCATION/TRAINING PROGRAM

## 2023-02-28 PROCEDURE — 1160F PR REVIEW ALL MEDS BY PRESCRIBER/CLIN PHARMACIST DOCUMENTED: ICD-10-PCS | Mod: CPTII,S$GLB,, | Performed by: STUDENT IN AN ORGANIZED HEALTH CARE EDUCATION/TRAINING PROGRAM

## 2023-02-28 PROCEDURE — 99204 OFFICE O/P NEW MOD 45 MIN: CPT | Mod: S$GLB,,, | Performed by: STUDENT IN AN ORGANIZED HEALTH CARE EDUCATION/TRAINING PROGRAM

## 2023-02-28 PROCEDURE — 3079F DIAST BP 80-89 MM HG: CPT | Mod: CPTII,S$GLB,, | Performed by: STUDENT IN AN ORGANIZED HEALTH CARE EDUCATION/TRAINING PROGRAM

## 2023-02-28 PROCEDURE — 3074F PR MOST RECENT SYSTOLIC BLOOD PRESSURE < 130 MM HG: ICD-10-PCS | Mod: CPTII,S$GLB,, | Performed by: STUDENT IN AN ORGANIZED HEALTH CARE EDUCATION/TRAINING PROGRAM

## 2023-02-28 PROCEDURE — 1159F PR MEDICATION LIST DOCUMENTED IN MEDICAL RECORD: ICD-10-PCS | Mod: CPTII,S$GLB,, | Performed by: STUDENT IN AN ORGANIZED HEALTH CARE EDUCATION/TRAINING PROGRAM

## 2023-02-28 PROCEDURE — 3008F BODY MASS INDEX DOCD: CPT | Mod: CPTII,S$GLB,, | Performed by: STUDENT IN AN ORGANIZED HEALTH CARE EDUCATION/TRAINING PROGRAM

## 2023-02-28 PROCEDURE — 3079F PR MOST RECENT DIASTOLIC BLOOD PRESSURE 80-89 MM HG: ICD-10-PCS | Mod: CPTII,S$GLB,, | Performed by: STUDENT IN AN ORGANIZED HEALTH CARE EDUCATION/TRAINING PROGRAM

## 2023-02-28 PROCEDURE — 3074F SYST BP LT 130 MM HG: CPT | Mod: CPTII,S$GLB,, | Performed by: STUDENT IN AN ORGANIZED HEALTH CARE EDUCATION/TRAINING PROGRAM

## 2023-02-28 PROCEDURE — 1160F RVW MEDS BY RX/DR IN RCRD: CPT | Mod: CPTII,S$GLB,, | Performed by: STUDENT IN AN ORGANIZED HEALTH CARE EDUCATION/TRAINING PROGRAM

## 2023-02-28 RX ORDER — BUDESONIDE 1 MG/2ML
INHALANT ORAL
Qty: 60 ML | Refills: 5 | Status: SHIPPED | OUTPATIENT
Start: 2023-02-28

## 2023-02-28 RX ORDER — CETIRIZINE HYDROCHLORIDE 10 MG/1
10 TABLET ORAL DAILY PRN
Qty: 90 TABLET | Refills: 1 | Status: SHIPPED | OUTPATIENT
Start: 2023-02-28

## 2023-02-28 RX ORDER — SODIUM PICOSULFATE, MAGNESIUM OXIDE, AND ANHYDROUS CITRIC ACID 10; 3.5; 12 MG/160ML; G/160ML; G/160ML
LIQUID ORAL
COMMUNITY
Start: 2022-10-19 | End: 2023-04-20

## 2023-02-28 RX ORDER — AMLODIPINE BESYLATE 5 MG/1
5 TABLET ORAL
COMMUNITY
Start: 2023-02-12 | End: 2023-07-05 | Stop reason: SDUPTHER

## 2023-02-28 RX ORDER — PSEUDOEPHEDRINE HCL 120 MG/1
120 TABLET, FILM COATED, EXTENDED RELEASE ORAL 2 TIMES DAILY PRN
Qty: 60 TABLET | Refills: 1 | Status: SHIPPED | OUTPATIENT
Start: 2023-02-28 | End: 2023-04-20

## 2023-02-28 NOTE — PATIENT INSTRUCTIONS
Testing  Blood work for allergy testing today       [facility] or call [phone]       Contact me with questions or concerns       I will contact you if anything needs immediate attention.        Treatment    Nasal rinse with budesonide at least once daily  Nasal rinse  Clearz = distilled water + packet + budesonide    It's important not to use tap or regular bottled water.    If you don't have distilled water, you can boil tap water to sterilize it, then let it cool.    Sudafed:  1 tablet morning and 1 in afternoon    Hold Claritin and Zyrtec until you feel well.  Recommend using as needed for itching or hives.      Revisit about 2 weeks

## 2023-02-28 NOTE — PROGRESS NOTES
Allergy Clinic Note  [facility]    This note was created by combination of typed  and M-Modal dictation. Transcription errors may be present.  If there are any questions, please contact me.    Subjective:      Patient ID: Anette Villa is a 37 y.o. female.    Chief Complaint: Nasal Congestion, Sinus Problem, and Allergies (Have been getting lots of Sinus problems and also have medication allergies - Nasal Drips, Sinus Pressure and Coughing )      Referring Provider:      History of Present Illness: Anette Villa is a 37 y.o. female presents complaining of worsening rhinitis and frequent sinusitis.  She is here alone, and she is a good historian.    Related medications and other interventions  Flonase 2 squirts once or twice daily  Claritin or Zyrtec daily   Nasal saline      02/28/2023:  At initial visit, client described chronic nasal congestion and frequent sinusitis, often complicated by chest symptoms.  Another major symptom is face pain which is worse in her left maxillary area radiating to the back of her left eye.  She says this has been worse since her retinal laser surgery.  Associated symptoms include runny nose, sneezing, itchy eyes, postnasal drip sensation.  She reports sinusitis episodes 3 to 4 times a year.  She denies associated fever or constitutional symptoms.  Symptoms usually start with facial pressure and runny nose followed by thick discolored mucus.  During her sinus infections she can also experience ear pain as well as coughing, wheezing, or shortness of breath.      As above, client sometimes experiences coughing, wheezing, or shortness of breath during sinus infections.  She reports similar symptoms with activity.  She denies problems with smoke or fumes.  She has been given an albuterol inhaler and notes that she has been using it more over the last 5-6 months.    Client also reports episodic hives and itching, approximately 5 episodes in the last 6 or 7  months.  She is concerned about food allergy.    As a child, client had episodes of frequent otitis and frequent strep pharyngitis.  She has multiple diagnosis of bronchitis but no history of pneumonia.       MEDICAL HISTORY      Significant past medical history:  Partial retinal detachment, GERD, hypertension, panic attacks  ENT surgery:  no    Significant family history:  Exposures:  Smoking Hx:  Client  reports that she has never smoked. She has never used smokeless tobacco.      Asthma: never Dx'd but sx during sinusitis Rx alb  Eczema: no  Rhinitis: yes -- see HPI  Venom allergy:  No  Latex allergy:  No    Patient Active Problem List   Diagnosis    Morbid obesity with BMI of 40.0-44.9, adult    Allergic rhinitis    Patient is a currently breast-feeding mother    Panic attacks    Binge eating    Post-trauma response    Anxiety    NAFLD (nonalcoholic fatty liver disease)    Iron deficiency anemia secondary to inadequate dietary iron intake    Nausea in adult    Abdominal pain, epigastric    Macular dystrophy     Medication List with Changes/Refills   New Medications    BUDESONIDE 1 MG/2 ML NBSP    Add 1 ampule to nasal rinse bottle daily.       Start Date: 2/28/2023 End Date: --    CETIRIZINE (ZYRTEC) 10 MG TABLET    Take 1 tablet (10 mg total) by mouth daily as needed for Allergies.       Start Date: 2/28/2023 End Date: --    PSEUDOEPHEDRINE (SUDAFED 12 HOUR) 120 MG 12 HR TABLET    Take 1 tablet (120 mg total) by mouth 2 (two) times daily as needed for Congestion (or head pressure.). Take one in AM and one midday.       Start Date: 2/28/2023 End Date: --   Current Medications    AMLODIPINE (NORVASC) 5 MG TABLET    Take 5 mg by mouth.       Start Date: 2/12/2023 End Date: --    CLENPIQ 10 MG-3.5 GRAM -12 GRAM/160 ML SOLN    SMARTSIG:Unspecified By Mouth As Directed       Start Date: 10/19/2022End Date: --    MULTIVITAMIN (THERAGRAN) PER TABLET    Take 1 tablet by mouth.       Start Date: --        End Date: --     ONDANSETRON (ZOFRAN) 4 MG TABLET    Take 4 mg by mouth every 4 (four) hours.       Start Date: 1/24/2023 End Date: --    SERTRALINE (ZOLOFT) 100 MG TABLET    Take 1 tablet (100 mg total) by mouth once daily. Further refills require visit with Dr. Dyer       Start Date: 5/21/2019 End Date: --         REVIEW OF SYSTEMS      CONST: no F/C/NS, no unintentional weight changes  NEURO:  no tremor, no weakness  EYES: no discharge, + pruritus, no erythema  EARS: no hearing loss, no sensation of fullness  NOSE: + congestion, no rhinorrhea, no sneezing  PULM:  + SOB, no wheezing, + cough  CV: no CP, no palpitations, no leg swelling  GI:  no abdominal pain, no blood in stool  :  no dysuria, no hematuria  DERM: no rashes, no skin breaks    PHYSICAL EXAM     /85 (BP Location: Right arm, Patient Position: Sitting, BP Method: Medium (Automatic))   Pulse 83   Wt 113.8 kg (250 lb 14.1 oz)   SpO2 98%   BMI 43.06 kg/m²   GEN: Awake and alert, no distress  DERM: No flushing, No rashes  EYE:  No occular discharge  HEENT: No nasal discharge, no hoarseness  PULM: Normal work of breathing, no cough  NEURO:  No focal deficit, speech fluent and logical  PSYCH: appropriate affect, normal behavior    MEDICAL DECISION MAKING     Data reviewed:      Allergy Testing      ordered      Lab results      Normal IgA, 2019    Protective titers to rubella, rubeola, mumps, varicella, hepatitis-B surface antibody, 2018    EO count 300, 2017      Imaging and other diagnostics            Medical records review         Assessment       Diagnoses:     Anette Villa is a 37 y.o. female. with  1. Chronic rhinitis    2. Chronic conjunctivitis of both eyes, unspecified chronic conjunctivitis type    3. Frequent episodes of sinusitis    4. Face pain    5. Hives    6. Mild intermittent asthma, unspecified whether complicated    7. Anxiety    8. Macular dystrophy          Plan / Orders     Chronic rhinitis  -     IgE; Future; Expected date:  02/28/2023  -     Dermatophagoides Nathalie; Future; Expected date: 02/28/2023  -     Dermatophagoides Pteronyssinus; Future; Expected date: 02/28/2023  -     [place]; Future; Expected date: 02/28/2023  -     Yunior; Future; Expected date: 02/28/2023  -     [place]; Future; Expected date: 02/28/2023  -     English Plantain; Future; Expected date: 02/28/2023  -     Ramsey Pecan; Future; Expected date: 02/28/2023  -     Pecan; Future; Expected date: 02/28/2023  -     Ragweed; Future; Expected date: 02/28/2023  -     Alternaria; Future; Expected date: 02/28/2023  -     Aspergillus; Future; Expected date: 02/28/2023  -     Cat; Future; Expected date: 02/28/2023  -     Cockroach; Future; Expected date: 02/28/2023  -     Dog; Future; Expected date: 02/28/2023    Chronic conjunctivitis     Frequent episodes of sinusitis  -     budesonide 1 mg/2 mL NbSp; Add 1 ampule to nasal rinse bottle daily.  Dispense: 60 mL; Refill: 5  -     pseudoephedrine (SUDAFED 12 HOUR) 120 mg 12 hr tablet; Take 1 tablet (120 mg total) by mouth 2 (two) times daily as needed for Congestion (or head pressure.). Take one in AM and one midday.  Dispense: 60 tablet; Refill: 1    Face pain    Hives  -     Allergen, Chick Pea; Future; Expected date: 02/28/2023  -     Sesame Seed IgE; Future; Expected date: 02/28/2023  -     Soybean IgE; Future; Expected date: 02/28/2023  -     Sunflower, seed IgE; Future; Expected date: 02/28/2023    Mild intermittent asthma, unspecified whether complicated    Comorbidities  Anxiety  -- follow sx closely on Sudafed  GERD  Macular dystrophy      PATIENT INSTRUCTIONS AND FOLLOW-UP     Patient Instructions   Testing  Blood work for allergy testing today       [facility] in one week for results or call [phone]       Contact me with questions or concerns       I will contact you if anything needs immediate attention.        Treatment    Nasal rinse with budesonide at least once daily  Nasal rinse  Clearz = distilled water +  packet + budesonide    It's important not to use tap or regular bottled water.    If you don't have distilled water, you can boil tap water to sterilize it, then let it cool.    Sudafed:  1 tablet morning and 1 in afternoon    Hold Claritin and Zyrtec until you feel well.  Recommend using as needed for itching or hives.      Revisit about 2 weeks          Kiya Medina MD  Allergy, Asthma and Immunology

## 2023-03-01 ENCOUNTER — TELEPHONE (OUTPATIENT)
Dept: ALLERGY | Facility: CLINIC | Age: 38
End: 2023-03-01
Payer: COMMERCIAL

## 2023-03-01 NOTE — TELEPHONE ENCOUNTER
----- Message from Suzanna Damon sent at 3/1/2023  8:31 AM CST -----  MACHO THOMPSON calling regarding Orders for labs that they discussed on yesterday and wanted to have them scheduled for Saturday when the PT is off from work, call back to Ruddy Young to inform [phone]

## 2023-03-02 ENCOUNTER — LAB VISIT (OUTPATIENT)
Dept: LAB | Facility: HOSPITAL | Age: 38
End: 2023-03-02
Attending: STUDENT IN AN ORGANIZED HEALTH CARE EDUCATION/TRAINING PROGRAM
Payer: COMMERCIAL

## 2023-03-02 DIAGNOSIS — J31.0 CHRONIC RHINITIS: ICD-10-CM

## 2023-03-02 PROCEDURE — 86003 ALLG SPEC IGE CRUDE XTRC EA: CPT | Mod: 59 | Performed by: STUDENT IN AN ORGANIZED HEALTH CARE EDUCATION/TRAINING PROGRAM

## 2023-03-02 PROCEDURE — 82785 ASSAY OF IGE: CPT | Performed by: STUDENT IN AN ORGANIZED HEALTH CARE EDUCATION/TRAINING PROGRAM

## 2023-03-03 LAB — IGE SERPL-ACNC: 390 IU/ML (ref 0–100)

## 2023-03-07 LAB
A ALTERNATA IGE QN: <0.1 KU/L
A FUMIGATUS IGE QN: <0.1 KU/L
BERMUDA GRASS IGE QN: <0.1 KU/L
CAT DANDER IGE QN: <0.1 KU/L
CEDAR IGE QN: <0.1 KU/L
D FARINAE IGE QN: <0.1 KU/L
D PTERONYSS IGE QN: <0.1 KU/L
DEPRECATED A ALTERNATA IGE RAST QL: NORMAL
DEPRECATED A FUMIGATUS IGE RAST QL: NORMAL
DEPRECATED BERMUDA GRASS IGE RAST QL: NORMAL
DEPRECATED CAT DANDER IGE RAST QL: NORMAL
DEPRECATED CEDAR IGE RAST QL: NORMAL
DEPRECATED D FARINAE IGE RAST QL: NORMAL
DEPRECATED D PTERONYSS IGE RAST QL: NORMAL
DEPRECATED DOG DANDER IGE RAST QL: NORMAL
DEPRECATED ENGL PLANTAIN IGE RAST QL: NORMAL
DEPRECATED PECAN/HICK TREE IGE RAST QL: NORMAL
DEPRECATED ROACH IGE RAST QL: NORMAL
DEPRECATED TIMOTHY IGE RAST QL: NORMAL
DEPRECATED WEST RAGWEED IGE RAST QL: NORMAL
DEPRECATED WHITE OAK IGE RAST QL: NORMAL
DOG DANDER IGE QN: <0.1 KU/L
ENGL PLANTAIN IGE QN: <0.1 KU/L
PECAN/HICK TREE IGE QN: <0.1 KU/L
ROACH IGE QN: <0.1 KU/L
TIMOTHY IGE QN: <0.1 KU/L
WEST RAGWEED IGE QN: <0.1 KU/L
WHITE OAK IGE QN: <0.1 KU/L

## 2023-03-14 ENCOUNTER — TELEPHONE (OUTPATIENT)
Dept: ALLERGY | Facility: CLINIC | Age: 38
End: 2023-03-14
Payer: COMMERCIAL

## 2023-03-14 NOTE — TELEPHONE ENCOUNTER
Called pt to assist with scheduling appt she stated she needed to be at work to view her schedule to see her availability an asked could I call her back arielle.            MACHO THOMPSON calling regarding Appointment Access for wanting to be rescheduled for the cancelled appt she cancel for tomorrow, stating she works for [employer] and she get's off at 4 pm and needed to be scheduled there after, call back

## 2023-03-14 NOTE — TELEPHONE ENCOUNTER
----- Message from Suzanna Damon sent at 3/13/2023  3:20 PM CDT -----  MACHO THOMPSON calling regarding Appointment Access for wanting to be rescheduled for the cancelled appt she cancel for tomorrow, stating she works for [employer] and she get's off at 4 pm and needed to be scheduled there after, call back to Ruddy Ramirez [phone]

## 2023-03-15 ENCOUNTER — TELEPHONE (OUTPATIENT)
Dept: ALLERGY | Facility: CLINIC | Age: 38
End: 2023-03-15
Payer: COMMERCIAL

## 2023-03-22 ENCOUNTER — LAB VISIT (OUTPATIENT)
Dept: LAB | Facility: HOSPITAL | Age: 38
End: 2023-03-22
Payer: COMMERCIAL

## 2023-03-22 ENCOUNTER — OFFICE VISIT (OUTPATIENT)
Dept: ALLERGY | Facility: CLINIC | Age: 38
End: 2023-03-22
Payer: COMMERCIAL

## 2023-03-22 VITALS — BODY MASS INDEX: 42.19 KG/M2 | HEIGHT: 64 IN | WEIGHT: 247.13 LBS

## 2023-03-22 DIAGNOSIS — J31.0 CHRONIC RHINITIS: ICD-10-CM

## 2023-03-22 DIAGNOSIS — J32.9 RECURRENT SINUSITIS: Primary | ICD-10-CM

## 2023-03-22 DIAGNOSIS — J98.8 WHEEZING-ASSOCIATED RESPIRATORY INFECTION (WARI): ICD-10-CM

## 2023-03-22 DIAGNOSIS — J32.9 RECURRENT SINUSITIS: ICD-10-CM

## 2023-03-22 LAB
IGA SERPL-MCNC: 173 MG/DL (ref 40–350)
IGG SERPL-MCNC: 1026 MG/DL (ref 650–1600)
IGM SERPL-MCNC: 98 MG/DL (ref 50–300)

## 2023-03-22 PROCEDURE — 36415 COLL VENOUS BLD VENIPUNCTURE: CPT | Performed by: ALLERGY & IMMUNOLOGY

## 2023-03-22 PROCEDURE — 99999 PR PBB SHADOW E&M-EST. PATIENT-LVL III: CPT | Mod: PBBFAC,,, | Performed by: ALLERGY & IMMUNOLOGY

## 2023-03-22 PROCEDURE — 99214 OFFICE O/P EST MOD 30 MIN: CPT | Mod: S$GLB,,, | Performed by: ALLERGY & IMMUNOLOGY

## 2023-03-22 PROCEDURE — 3008F BODY MASS INDEX DOCD: CPT | Mod: CPTII,S$GLB,, | Performed by: ALLERGY & IMMUNOLOGY

## 2023-03-22 PROCEDURE — 99214 PR OFFICE/OUTPT VISIT, EST, LEVL IV, 30-39 MIN: ICD-10-PCS | Mod: S$GLB,,, | Performed by: ALLERGY & IMMUNOLOGY

## 2023-03-22 PROCEDURE — 82784 ASSAY IGA/IGD/IGG/IGM EACH: CPT | Performed by: ALLERGY & IMMUNOLOGY

## 2023-03-22 PROCEDURE — 1159F MED LIST DOCD IN RCRD: CPT | Mod: CPTII,S$GLB,, | Performed by: ALLERGY & IMMUNOLOGY

## 2023-03-22 PROCEDURE — 86317 IMMUNOASSAY INFECTIOUS AGENT: CPT | Mod: 59 | Performed by: ALLERGY & IMMUNOLOGY

## 2023-03-22 PROCEDURE — 3008F PR BODY MASS INDEX (BMI) DOCUMENTED: ICD-10-PCS | Mod: CPTII,S$GLB,, | Performed by: ALLERGY & IMMUNOLOGY

## 2023-03-22 PROCEDURE — 99999 PR PBB SHADOW E&M-EST. PATIENT-LVL III: ICD-10-PCS | Mod: PBBFAC,,, | Performed by: ALLERGY & IMMUNOLOGY

## 2023-03-22 PROCEDURE — 1159F PR MEDICATION LIST DOCUMENTED IN MEDICAL RECORD: ICD-10-PCS | Mod: CPTII,S$GLB,, | Performed by: ALLERGY & IMMUNOLOGY

## 2023-03-22 RX ORDER — AMOXICILLIN AND CLAVULANATE POTASSIUM 875; 125 MG/1; MG/1
1 TABLET, FILM COATED ORAL EVERY 12 HOURS
Qty: 20 TABLET | Refills: 0 | Status: SHIPPED | OUTPATIENT
Start: 2023-03-22 | End: 2023-04-01

## 2023-03-22 RX ORDER — PREDNISONE 20 MG/1
40 TABLET ORAL DAILY
Qty: 10 TABLET | Refills: 0 | Status: SHIPPED | OUTPATIENT
Start: 2023-03-22 | End: 2023-03-27

## 2023-03-22 NOTE — PROGRESS NOTES
LV 2/28/23 w Dr. Medina  New to me    Subjective:      Patient ID: Anette Villa is a 37 y.o. female.    Chief Complaint: Nasal Congestion      Referring Provider:      History of Present Illness: Anette Villa is a 37 y.o. female .      Pt of Dr. Medina presents w concern of worsening rhinosinusitis sx's since LV w her on 2/28.  C/o worsening nasal discharge, congestion, sinus pressure, post-nasal drip, wheeze and SOB over last ~2 weeks. No fever.  Is using nasally instilled budesonide rx'd at last visit.  Did not tolerate oral decongestant--found it agitating.   Has been using albuterol about twice daily over the last week.    Expresses concern of frequent URIs. Reports this is the 4th one this calendar year. [facility] takes abx 2-3 times per year for URI, sinusitis. Usu notes temporary improvement w abx.    Reports occasional hives, possibly related to URIs      Hx from Dr. Medina's last note:  02/28/2023:  At initial visit, client described chronic nasal congestion and frequent sinusitis, often complicated by chest symptoms.  Another major symptom is face pain which is worse in her left maxillary area radiating to the back of her left eye.  She says this has been worse since her retinal laser surgery.  Associated symptoms include runny nose, sneezing, itchy eyes, postnasal drip sensation.  She reports sinusitis episodes 3 to 4 times a year.  She denies associated fever or constitutional symptoms.  Symptoms usually start with facial pressure and runny nose followed by thick discolored mucus.  During her sinus infections she can also experience ear pain as well as coughing, wheezing, or shortness of breath.    As above, client sometimes experiences coughing, wheezing, or shortness of breath during sinus infections.  She reports similar symptoms with activity.  She denies problems with smoke or fumes.  She has been given an albuterol inhaler and notes that she has been using it more over the last 5-6  months.  Client also reports episodic hives and itching, approximately 5 episodes in the last 6 or 7 months.  She is concerned about food allergy.  As a child, client had episodes of frequent otitis and frequent strep pharyngitis.  She has multiple diagnosis of bronchitis but no history of pneumonia.         MEDICAL HISTORY      Significant past medical history:  Partial retinal detachment, GERD, hypertension, panic attacks  ENT surgery:  no    Significant family history:  Exposures:  Smoking Hx:  Client  reports that she has never smoked. She has been exposed to tobacco smoke. She has never used smokeless tobacco.      Asthma: never Dx'd but sx during sinusitis Rx alb  Eczema: no  Rhinitis: yes -- see HPI  Venom allergy:  No  Latex allergy:  No    Patient Active Problem List   Diagnosis    Morbid obesity with BMI of 40.0-44.9, adult    Allergic rhinitis    Patient is a currently breast-feeding mother    Panic attacks    Binge eating    Post-trauma response    Anxiety    NAFLD (nonalcoholic fatty liver disease)    Iron deficiency anemia secondary to inadequate dietary iron intake    Nausea in adult    Abdominal pain, epigastric    Macular dystrophy     Medication List with Changes/Refills   New Medications    AMOXICILLIN-CLAVULANATE 875-125MG (AUGMENTIN) 875-125 MG PER TABLET    Take 1 tablet by mouth every 12 (twelve) hours. for 10 days       Start Date: 3/22/2023 End Date: 4/1/2023    PREDNISONE (DELTASONE) 20 MG TABLET    Take 2 tablets (40 mg total) by mouth once daily. for 5 days       Start Date: 3/22/2023 End Date: 3/27/2023   Current Medications    AMLODIPINE (NORVASC) 5 MG TABLET    Take 5 mg by mouth.       Start Date: 2/12/2023 End Date: --    BUDESONIDE 1 MG/2 ML NBSP    Add 1 ampule to nasal rinse bottle daily.       Start Date: 2/28/2023 End Date: --    CETIRIZINE (ZYRTEC) 10 MG TABLET    Take 1 tablet (10 mg total) by mouth daily as needed for Allergies.       Start Date: 2/28/2023 End Date: --     "CLENPIQ 10 MG-3.5 GRAM -12 GRAM/160 ML SOLN    SMARTSIG:Unspecified By Mouth As Directed       Start Date: 10/19/2022End Date: --    MULTIVITAMIN (THERAGRAN) PER TABLET    Take 1 tablet by mouth.       Start Date: --        End Date: --    ONDANSETRON (ZOFRAN) 4 MG TABLET    Take 4 mg by mouth every 4 (four) hours.       Start Date: 1/24/2023 End Date: --    PSEUDOEPHEDRINE (SUDAFED 12 HOUR) 120 MG 12 HR TABLET    Take 1 tablet (120 mg total) by mouth 2 (two) times daily as needed for Congestion (or head pressure.). Take one in AM and one midday.       Start Date: 2/28/2023 End Date: --    SERTRALINE (ZOLOFT) 100 MG TABLET    Take 1 tablet (100 mg total) by mouth once daily. Further refills require visit with Dr. Dyer       Start Date: 5/21/2019 End Date: --         REVIEW OF SYSTEMS      CONST: no F/C/NS, no unintentional weight changes  NEURO:  no tremor, no weakness  EYES: no discharge, + pruritus, no erythema  EARS: no hearing loss, no sensation of fullness  NOSE: + congestion, + rhinorrhea, no sneezing  PULM:  + SOB, +wheezing, + cough  CV: no CP, no palpitations, no leg swelling  GI:  no abdominal pain, no blood in stool  :  no dysuria, no hematuria  DERM: no rashes, no skin breaks    PHYSICAL EXAM     Ht 5' 4" (1.626 m)   Wt 112.1 kg (247 lb 2.2 oz)   BMI 42.42 kg/m²   GEN: Awake and alert, no distress  DERM: No flushing, No rashes  EYE:  No occular discharge  HEENT: + rhinorrhea, + maxillary sinus tenderness B   PULM: Normal work of breathing, no cough  NEURO:  No focal deficit, speech fluent and logical  PSYCH: appropriate affect, normal behavior    MEDICAL DECISION MAKING     Data reviewed:      Allergy Testing            Lab results      Normal IgA, 2019    Protective titers to rubella, rubeola, mumps, varicella, hepatitis-B surface antibody, 2018    EO count 300, 2017     Latest Reference Range & Units 03/02/23 16:22   A. fumigatus Class  CLASS 0   ALLERGEN SUNFLOWER SEED IGE <0.10 kU/L <0.10 "   Altern. alternata Class  CLASS 0   Alternaria alternata <0.10 kU/L <0.10   Aspergillus Fumigatus IgE <0.10 kU/L <0.10   BERMUDA GRASS <0.10 kU/L <0.10   [place] Grass Class  CLASS 0   Cat Dander <0.10 kU/L <0.10   Cat Epithelium Class  CLASS 0   [place] Class  CLASS 0   [place] IgE <0.10 kU/L <0.10   Chick Pea Class  CLASS 0   Chick Pea, IgE <0.10 kU/L <0.10   Cockroach, IgE <0.10 kU/L  -  <0.10  CLASS 0   D. farinae <0.10 kU/L <0.10   D. farinae Class  CLASS 0   D. pteronyssinus Class  CLASS 0   Dog Dander, IgE <0.10 kU/L <0.10   Dog Dander Class  CLASS 0   English Plantain Class  CLASS 0   Mite Dust Pteronyssinus IgE <0.10 kU/L <0.10   [place], Class  CLASS 0   Pecan [place] Tree <0.10 kU/L <0.10   Pecan, Class  CLASS 0   Plantain <0.10 kU/L <0.10   Ragweed, Western IgE <0.10 kU/L <0.10   Ragweed, Western, Class  CLASS 0   Sesame Seed Class  CLASS 0   Sesame Seed, IgE <0.10 kU/L <0.10   Soybean Class  CLASS 0   Soybean IgE <0.10 kU/L <0.10   Sunflower Seed Class  CLASS 0   Yunior Grass <0.10 kU/L <0.10   Yunior Grass Class  CLASS 0         Latest Reference Range & Units 03/02/23 16:22   IgE 0 - 100 IU/mL 390 (H)   (H): Data is abnormally high   Imaging and other diagnostics            Medical records review         Assessment       Diagnoses:     Anette Villa is a 37 y.o. female. with  1. Recurrent sinusitis    2. Chronic rhinitis    3. Wheezing-associated respiratory infection (WARI)            Plan / Orders     Augmentin  Prednisone taper  Albuterol q 4-6 hours prn  Cont nasally instilled budsedonide  Check quant immunoglobulins, s pneumo titers.  Luis Enrique Medina in 2-4 weeks

## 2023-03-30 LAB
DEPRECATED S PNEUM12 IGG SER-MCNC: <0.3 UG/ML
DEPRECATED S PNEUM23 IGG SER-MCNC: <0.3 UG/ML
DEPRECATED S PNEUM4 IGG SER-MCNC: 0.5 UG/ML
DEPRECATED S PNEUM8 IGG SER-MCNC: 0.5 UG/ML
DEPRECATED S PNEUM9 IGG SER-MCNC: <0.3 UG/ML
S PN DA SERO 19F IGG SER-MCNC: 1 UG/ML
S PNEUM DA 1 IGG SER-MCNC: <0.3 UG/ML
S PNEUM DA 14 IGG SER-MCNC: 0.8
S PNEUM DA 18C IGG SER-MCNC: 1.4
S PNEUM DA 3 IGG SER-MCNC: 0.7 UG/ML
S PNEUM DA 5 IGG SER-MCNC: 0.4 UG/ML
S PNEUM DA 6B IGG SER-MCNC: <0.3 UG/ML
S PNEUM DA 7F IGG SER-MCNC: 1.1 UG/ML
S PNEUM DA 9V IGG SER-MCNC: <0.3 UG/ML

## 2023-03-31 DIAGNOSIS — Z00.00 ROUTINE GENERAL MEDICAL EXAMINATION AT A HEALTH CARE FACILITY: ICD-10-CM

## 2023-03-31 NOTE — TELEPHONE ENCOUNTER
Routing refill request to provider for review/approval because:  Needs to be seen for annual.    Sophy Benton RN on 3/31/2023 at 1:37 PM

## 2023-04-03 RX ORDER — NORGESTIMATE AND ETHINYL ESTRADIOL 7DAYSX3 28
KIT ORAL
Qty: 84 TABLET | Refills: 3 | Status: SHIPPED | OUTPATIENT
Start: 2023-04-03 | End: 2024-03-14

## 2023-04-20 ENCOUNTER — HOSPITAL ENCOUNTER (OUTPATIENT)
Dept: RADIOLOGY | Facility: HOSPITAL | Age: 38
Discharge: HOME | End: 2023-04-20
Attending: FAMILY MEDICINE
Payer: COMMERCIAL

## 2023-04-20 ENCOUNTER — OFFICE VISIT (OUTPATIENT)
Dept: FAMILY MEDICINE | Facility: CLINIC | Age: 38
End: 2023-04-20
Attending: FAMILY MEDICINE
Payer: COMMERCIAL

## 2023-04-20 ENCOUNTER — OFFICE VISIT (OUTPATIENT)
Dept: ALLERGY | Facility: CLINIC | Age: 38
End: 2023-04-20
Payer: COMMERCIAL

## 2023-04-20 VITALS
SYSTOLIC BLOOD PRESSURE: 120 MMHG | BODY MASS INDEX: 40.95 KG/M2 | WEIGHT: 239.88 LBS | HEIGHT: 64 IN | HEART RATE: 75 BPM | DIASTOLIC BLOOD PRESSURE: 70 MMHG | OXYGEN SATURATION: 99 %

## 2023-04-20 VITALS
BODY MASS INDEX: 41.17 KG/M2 | WEIGHT: 239.88 LBS | OXYGEN SATURATION: 98 % | HEART RATE: 69 BPM | SYSTOLIC BLOOD PRESSURE: 118 MMHG | DIASTOLIC BLOOD PRESSURE: 79 MMHG

## 2023-04-20 DIAGNOSIS — J31.0 CHRONIC NONALLERGIC RHINITIS: ICD-10-CM

## 2023-04-20 DIAGNOSIS — M53.3 SACRAL BACK PAIN: ICD-10-CM

## 2023-04-20 DIAGNOSIS — E66.01 MORBID OBESITY WITH BMI OF 40.0-44.9, ADULT: ICD-10-CM

## 2023-04-20 DIAGNOSIS — K76.0 NAFLD (NONALCOHOLIC FATTY LIVER DISEASE): ICD-10-CM

## 2023-04-20 DIAGNOSIS — R76.0 ABNORMAL ANTIBODY TITER: ICD-10-CM

## 2023-04-20 DIAGNOSIS — M54.32 BILATERAL SCIATICA: ICD-10-CM

## 2023-04-20 DIAGNOSIS — I10 PRIMARY HYPERTENSION: ICD-10-CM

## 2023-04-20 DIAGNOSIS — M54.31 BILATERAL SCIATICA: ICD-10-CM

## 2023-04-20 DIAGNOSIS — Z01.84 IMMUNITY STATUS TESTING: ICD-10-CM

## 2023-04-20 DIAGNOSIS — M53.3 SACRAL BACK PAIN: Primary | ICD-10-CM

## 2023-04-20 DIAGNOSIS — J32.9 FREQUENT SINUS INFECTIONS: Primary | ICD-10-CM

## 2023-04-20 DIAGNOSIS — J45.20 MILD INTERMITTENT ASTHMA, UNSPECIFIED WHETHER COMPLICATED: ICD-10-CM

## 2023-04-20 PROCEDURE — 1159F PR MEDICATION LIST DOCUMENTED IN MEDICAL RECORD: ICD-10-PCS | Mod: CPTII,S$GLB,, | Performed by: STUDENT IN AN ORGANIZED HEALTH CARE EDUCATION/TRAINING PROGRAM

## 2023-04-20 PROCEDURE — 3074F PR MOST RECENT SYSTOLIC BLOOD PRESSURE < 130 MM HG: ICD-10-PCS | Mod: CPTII,S$GLB,, | Performed by: STUDENT IN AN ORGANIZED HEALTH CARE EDUCATION/TRAINING PROGRAM

## 2023-04-20 PROCEDURE — 99999 PR PBB SHADOW E&M-EST. PATIENT-LVL IV: ICD-10-PCS | Mod: PBBFAC,,, | Performed by: FAMILY MEDICINE

## 2023-04-20 PROCEDURE — 3008F PR BODY MASS INDEX (BMI) DOCUMENTED: ICD-10-PCS | Mod: CPTII,S$GLB,, | Performed by: STUDENT IN AN ORGANIZED HEALTH CARE EDUCATION/TRAINING PROGRAM

## 2023-04-20 PROCEDURE — 99204 OFFICE O/P NEW MOD 45 MIN: CPT | Mod: S$GLB,,, | Performed by: FAMILY MEDICINE

## 2023-04-20 PROCEDURE — 90677 PCV20 VACCINE IM: CPT | Mod: S$GLB,,, | Performed by: STUDENT IN AN ORGANIZED HEALTH CARE EDUCATION/TRAINING PROGRAM

## 2023-04-20 PROCEDURE — 72220 XR SACRUM AND COCCYX: ICD-10-PCS | Mod: 26,,, | Performed by: RADIOLOGY

## 2023-04-20 PROCEDURE — 72220 X-RAY EXAM SACRUM TAILBONE: CPT | Mod: 26,,, | Performed by: RADIOLOGY

## 2023-04-20 PROCEDURE — 3008F PR BODY MASS INDEX (BMI) DOCUMENTED: ICD-10-PCS | Mod: CPTII,S$GLB,, | Performed by: FAMILY MEDICINE

## 2023-04-20 PROCEDURE — 3078F DIAST BP <80 MM HG: CPT | Mod: CPTII,S$GLB,, | Performed by: FAMILY MEDICINE

## 2023-04-20 PROCEDURE — 99204 PR OFFICE/OUTPT VISIT, NEW, LEVL IV, 45-59 MIN: ICD-10-PCS | Mod: S$GLB,,, | Performed by: FAMILY MEDICINE

## 2023-04-20 PROCEDURE — 3074F SYST BP LT 130 MM HG: CPT | Mod: CPTII,S$GLB,, | Performed by: FAMILY MEDICINE

## 2023-04-20 PROCEDURE — 95004 PERQ TESTS W/ALRGNC XTRCS: CPT | Mod: S$GLB,,, | Performed by: STUDENT IN AN ORGANIZED HEALTH CARE EDUCATION/TRAINING PROGRAM

## 2023-04-20 PROCEDURE — 3078F PR MOST RECENT DIASTOLIC BLOOD PRESSURE < 80 MM HG: ICD-10-PCS | Mod: CPTII,S$GLB,, | Performed by: FAMILY MEDICINE

## 2023-04-20 PROCEDURE — 3074F SYST BP LT 130 MM HG: CPT | Mod: CPTII,S$GLB,, | Performed by: STUDENT IN AN ORGANIZED HEALTH CARE EDUCATION/TRAINING PROGRAM

## 2023-04-20 PROCEDURE — 99999 PR PBB SHADOW E&M-EST. PATIENT-LVL IV: CPT | Mod: PBBFAC,,, | Performed by: FAMILY MEDICINE

## 2023-04-20 PROCEDURE — 99999 PR PBB SHADOW E&M-EST. PATIENT-LVL III: ICD-10-PCS | Mod: PBBFAC,,, | Performed by: STUDENT IN AN ORGANIZED HEALTH CARE EDUCATION/TRAINING PROGRAM

## 2023-04-20 PROCEDURE — 90677 PNEUMOCOCCAL CONJUGATE VACCINE 20-VALENT: ICD-10-PCS | Mod: S$GLB,,, | Performed by: STUDENT IN AN ORGANIZED HEALTH CARE EDUCATION/TRAINING PROGRAM

## 2023-04-20 PROCEDURE — 90471 PNEUMOCOCCAL CONJUGATE VACCINE 20-VALENT: ICD-10-PCS | Mod: S$GLB,,, | Performed by: STUDENT IN AN ORGANIZED HEALTH CARE EDUCATION/TRAINING PROGRAM

## 2023-04-20 PROCEDURE — 99214 OFFICE O/P EST MOD 30 MIN: CPT | Mod: 25,S$GLB,, | Performed by: STUDENT IN AN ORGANIZED HEALTH CARE EDUCATION/TRAINING PROGRAM

## 2023-04-20 PROCEDURE — 95004 PR ALLERGY SKIN TESTS,ALLERGENS: ICD-10-PCS | Mod: S$GLB,,, | Performed by: STUDENT IN AN ORGANIZED HEALTH CARE EDUCATION/TRAINING PROGRAM

## 2023-04-20 PROCEDURE — 3078F DIAST BP <80 MM HG: CPT | Mod: CPTII,S$GLB,, | Performed by: STUDENT IN AN ORGANIZED HEALTH CARE EDUCATION/TRAINING PROGRAM

## 2023-04-20 PROCEDURE — 3008F BODY MASS INDEX DOCD: CPT | Mod: CPTII,S$GLB,, | Performed by: FAMILY MEDICINE

## 2023-04-20 PROCEDURE — 3078F PR MOST RECENT DIASTOLIC BLOOD PRESSURE < 80 MM HG: ICD-10-PCS | Mod: CPTII,S$GLB,, | Performed by: STUDENT IN AN ORGANIZED HEALTH CARE EDUCATION/TRAINING PROGRAM

## 2023-04-20 PROCEDURE — 1160F RVW MEDS BY RX/DR IN RCRD: CPT | Mod: CPTII,S$GLB,, | Performed by: STUDENT IN AN ORGANIZED HEALTH CARE EDUCATION/TRAINING PROGRAM

## 2023-04-20 PROCEDURE — 1160F PR REVIEW ALL MEDS BY PRESCRIBER/CLIN PHARMACIST DOCUMENTED: ICD-10-PCS | Mod: CPTII,S$GLB,, | Performed by: STUDENT IN AN ORGANIZED HEALTH CARE EDUCATION/TRAINING PROGRAM

## 2023-04-20 PROCEDURE — 99999 PR PBB SHADOW E&M-EST. PATIENT-LVL III: CPT | Mod: PBBFAC,,, | Performed by: STUDENT IN AN ORGANIZED HEALTH CARE EDUCATION/TRAINING PROGRAM

## 2023-04-20 PROCEDURE — 3008F BODY MASS INDEX DOCD: CPT | Mod: CPTII,S$GLB,, | Performed by: STUDENT IN AN ORGANIZED HEALTH CARE EDUCATION/TRAINING PROGRAM

## 2023-04-20 PROCEDURE — 72220 X-RAY EXAM SACRUM TAILBONE: CPT | Mod: TC,FY

## 2023-04-20 PROCEDURE — 1159F MED LIST DOCD IN RCRD: CPT | Mod: CPTII,S$GLB,, | Performed by: STUDENT IN AN ORGANIZED HEALTH CARE EDUCATION/TRAINING PROGRAM

## 2023-04-20 PROCEDURE — 99214 PR OFFICE/OUTPT VISIT, EST, LEVL IV, 30-39 MIN: ICD-10-PCS | Mod: 25,S$GLB,, | Performed by: STUDENT IN AN ORGANIZED HEALTH CARE EDUCATION/TRAINING PROGRAM

## 2023-04-20 PROCEDURE — 90471 IMMUNIZATION ADMIN: CPT | Mod: S$GLB,,, | Performed by: STUDENT IN AN ORGANIZED HEALTH CARE EDUCATION/TRAINING PROGRAM

## 2023-04-20 PROCEDURE — 3074F PR MOST RECENT SYSTOLIC BLOOD PRESSURE < 130 MM HG: ICD-10-PCS | Mod: CPTII,S$GLB,, | Performed by: FAMILY MEDICINE

## 2023-04-20 RX ORDER — LORATADINE 10 MG
10 TABLET,DISINTEGRATING ORAL
Status: DISCONTINUED | OUTPATIENT
Start: 2023-04-20 | End: 2023-04-20

## 2023-04-20 NOTE — PATIENT INSTRUCTIONS
Testing  Blood work in one month to check response to pneumonia vaccine given today        Treatment    Continue Flonase 2 squirts each nostril twice daily    Continue nasal rinses with budesonide at least every other day.  Increased frequency with 1st sign of facial pressure or infection

## 2023-04-20 NOTE — PROGRESS NOTES
Allergy Clinic Note  [facility]    This note was created by combination of typed  and M-Modal dictation. Transcription errors may be present.  If there are any questions, please contact me.    Subjective:      Patient ID: Anette Villa is a 37 y.o. female.    Chief Complaint: Allergies      Allergy problem list:    Frequent sinusitis   Chronic rhinitis with negative immuno caps   Mild intermittent asthma  Intolerant oral decongestants    History of Present Illness: Anette Villa is a 37 y.o. female presents complaining of worsening rhinitis and frequent sinusitis.  She is here alone, and she is a good historian.    Related medications and other interventions  Flonase 2 squirts once or twice daily  --using twice daily  Claritin or Zyrtec as needed --not using  Nasal saline with budesonide --about every other day    3/22/23:  Seen by my [facility] colleague Dr Cowan for worsening rhinosinusitis sx.  She was treated with a prednisone taper and Augmentin.  She was also experiencing mild wheezing treated with albuterol.  Total and specific antibodies ordered.  Pneumococcal titers noted to be low.  Patient also reported intolerance to oral decongestants.    02/28/2023:  At initial visit, client described chronic nasal congestion and frequent sinusitis, often complicated by chest symptoms.  Another major symptom is face pain which is worse in her left maxillary area radiating to the back of her left eye.  She says this has been worse since her retinal laser surgery.  Associated symptoms include runny nose, sneezing, itchy eyes, postnasal drip sensation.  She reports sinusitis episodes 3 to 4 times a year.  She denies associated fever or constitutional symptoms.  Symptoms usually start with facial pressure and runny nose followed by thick discolored mucus.  During her sinus infections she can also experience ear pain as well as coughing, wheezing, or shortness of breath.      As  above, client sometimes experiences coughing, wheezing, or shortness of breath during sinus infections.  She reports similar symptoms with activity.  She denies problems with smoke or fumes.  She has been given an albuterol inhaler and notes that she has been using it more over the last 5-6 months.    Client also reports episodic hives and itching, approximately 5 episodes in the last 6 or 7 months.  She is concerned about food allergy.    As a child, client had episodes of frequent otitis and frequent strep pharyngitis.  She has multiple diagnosis of bronchitis but no history of pneumonia.       MEDICAL HISTORY      Significant past medical history:  Partial retinal detachment, GERD, hypertension, panic attacks  ENT surgery:  no    Significant family history:  Exposures:  Smoking Hx:  Client  reports that she has never smoked. She has been exposed to tobacco smoke. She has never used smokeless tobacco.      Asthma: never Dx'd but sx during sinusitis Rx alb  Eczema: no  Rhinitis: yes -- see HPI  Venom allergy:  No  Latex allergy:  No    Patient Active Problem List   Diagnosis    Morbid obesity with BMI of 40.0-44.9, adult    Allergic rhinitis    Patient is a currently breast-feeding mother    Panic attacks    Binge eating    Post-trauma response    Anxiety    NAFLD (nonalcoholic fatty liver disease)    Iron deficiency anemia secondary to inadequate dietary iron intake    Nausea in adult    Abdominal pain, epigastric    Macular dystrophy     Medication List with Changes/Refills   Current Medications    AMLODIPINE (NORVASC) 5 MG TABLET    Take 5 mg by mouth.       Start Date: 2/12/2023 End Date: --    BUDESONIDE 1 MG/2 ML NBSP    Add 1 ampule to nasal rinse bottle daily.       Start Date: 2/28/2023 End Date: --    CETIRIZINE (ZYRTEC) 10 MG TABLET    Take 1 tablet (10 mg total) by mouth daily as needed for Allergies.       Start Date: 2/28/2023 End Date: --    CLENPIQ 10 MG-3.5 GRAM -12 GRAM/160 ML SOLN     SMARTSIG:Unspecified By Mouth As Directed       Start Date: 10/19/2022End Date: --    MULTIVITAMIN (THERAGRAN) PER TABLET    Take 1 tablet by mouth.       Start Date: --        End Date: --    ONDANSETRON (ZOFRAN) 4 MG TABLET    Take 4 mg by mouth every 4 (four) hours.       Start Date: 1/24/2023 End Date: --    PSEUDOEPHEDRINE (SUDAFED 12 HOUR) 120 MG 12 HR TABLET    Take 1 tablet (120 mg total) by mouth 2 (two) times daily as needed for Congestion (or head pressure.). Take one in AM and one midday.       Start Date: 2/28/2023 End Date: --    SERTRALINE (ZOLOFT) 100 MG TABLET    Take 1 tablet (100 mg total) by mouth once daily. Further refills require visit with Dr. Dyre       Start Date: 5/21/2019 End Date: --         REVIEW OF SYSTEMS      CONST: no F/C/NS, no unintentional weight changes  NEURO:  no tremor, no weakness  EYES: no discharge, + pruritus, no erythema  EARS: no hearing loss, no sensation of fullness  NOSE: + congestion, no rhinorrhea, no sneezing  PULM:  + SOB, no wheezing, + cough  CV: no CP, no palpitations, no leg swelling  GI:  no abdominal pain, no blood in stool  :  no dysuria, no hematuria  DERM: no rashes, no skin breaks    PHYSICAL EXAM     /79 (BP Location: Left arm, Patient Position: Sitting, BP Method: Large (Automatic))   Pulse 69   Wt 108.8 kg (239 lb 13.8 oz)   SpO2 98%   BMI 41.17 kg/m²   GEN: Awake and alert, no distress  DERM: No flushing, No rashes  EYE:  No occular discharge  HEENT: No nasal discharge, no hoarseness, Nares are pink with significant turbinate swelling.  Oropharynx is benign without exudate.  Tongue is not coated.  NECK; no LAD  PULM: Normal work of breathing, no cough, CTA  COR:  RRR, normal pulses  NEURO:  No focal deficit, speech fluent and logical  PSYCH: appropriate affect, normal behavior    MEDICAL DECISION MAKING     Data reviewed:      Allergy Testing      Aeroallergen skin testing by the modified prick method (04/20/2023) are completely  negative with appropriate positive and negative controls.    Inhalant Immunocap negative, 2023    Selected food Immunocap negative to sesame seed, soybean, sunflower seed, 2023      Lab results      Immune labs (03/22/2023)   Normal IgG, IgA and IgM  Low pneumococcal titers:  Below 1.6 414/14 serotypes     Total IgE 390    Normal IgA, 2019    Protective titers to rubella, rubeola, mumps, varicella, hepatitis-B surface antibody, 2018    EO count 300, 2017      Imaging and other diagnostics            Medical records review         Assessment     Diagnoses:     Anette Villa is a 37 y.o. female. with  1. Frequent sinus infections    2. Abnormal ab:  low pneum titer at baseline    3. Chronic nonallergic rhinitis    4. Mild intermittent asthma, unspecified whether complicated    5. Immunity status testing        Plan / Orders     Chronic nonallergic rhinitis   Continue Flonase 2 squirts each nostril twice daily    Frequent episodes of sinusitis with low baseline pneumo titers  Nasal rinses with budesonide at least every other day, increasing frequency at 1st sign of infection  -Prevnar 20 given today    Face pain  Increase frequency of nasal rinses    Mild intermittent asthma, infection trigger  Prev 20 given today    Comorbidities  Anxiety  --Avoid oral decongestants  GERD  --may mimic rhinitis  Macular dystrophy      PATIENT INSTRUCTIONS AND FOLLOW-UP     Patient Instructions   Testing  Blood work in one month to check response to pneumonia vaccine given today        Treatment    Continue Flonase 2 squirts each nostril twice daily    Continue nasal rinses with budesonide at least every other day.  Increased frequency with 1st sign of facial pressure or infection        Kiya Medina MD  Allergy, Asthma & Immunology    I spent a total of 35 minutes on the day of the visit, not including time for skin test preparation, reading, and interpretation.  This includes face to face time and non-face to face time preparing to  see the patient (eg, review of tests), obtaining and/or reviewing separately obtained history, documenting clinical information in the electronic or other health record, independently interpreting results and communicating results to the patient/family/caregiver, or care coordinator.

## 2023-04-20 NOTE — PROGRESS NOTES
Patient was given Claritin RediTab 10 mg in office. Tolerated well.    LOT [id number]  01/31/2024 exp

## 2023-04-21 PROBLEM — M54.31 BILATERAL SCIATICA: Status: ACTIVE | Noted: 2023-04-21

## 2023-04-21 PROBLEM — M54.32 BILATERAL SCIATICA: Status: ACTIVE | Noted: 2023-04-21

## 2023-04-21 PROBLEM — M53.3 SACRAL BACK PAIN: Status: ACTIVE | Noted: 2023-04-21

## 2023-04-21 PROBLEM — I10 PRIMARY HYPERTENSION: Status: ACTIVE | Noted: 2023-04-21
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
## 2023-04-24 ENCOUNTER — CLINICAL SUPPORT (OUTPATIENT)
Dept: REHABILITATION | Facility: HOSPITAL | Age: 38
End: 2023-04-24
Attending: FAMILY MEDICINE
Payer: COMMERCIAL

## 2023-04-24 DIAGNOSIS — M54.31 BILATERAL SCIATICA: ICD-10-CM

## 2023-04-24 DIAGNOSIS — R26.89 DECREASED FUNCTIONAL MOBILITY: ICD-10-CM

## 2023-04-24 DIAGNOSIS — M53.3 SACRAL BACK PAIN: ICD-10-CM

## 2023-04-24 DIAGNOSIS — M54.32 BILATERAL SCIATICA: ICD-10-CM

## 2023-04-24 PROCEDURE — 97161 PT EVAL LOW COMPLEX 20 MIN: CPT | Mod: PN

## 2023-04-24 PROCEDURE — 97140 MANUAL THERAPY 1/> REGIONS: CPT | Mod: PN

## 2023-04-25 PROBLEM — R26.89 DECREASED FUNCTIONAL MOBILITY: Status: ACTIVE | Noted: 2023-04-25
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
## 2023-05-03 ENCOUNTER — CLINICAL SUPPORT (OUTPATIENT)
Dept: REHABILITATION | Facility: HOSPITAL | Age: 38
End: 2023-05-03
Payer: COMMERCIAL

## 2023-05-03 DIAGNOSIS — R26.89 DECREASED FUNCTIONAL MOBILITY: Primary | ICD-10-CM

## 2023-05-03 PROCEDURE — 97140 MANUAL THERAPY 1/> REGIONS: CPT | Mod: PN

## 2023-05-03 PROCEDURE — 97110 THERAPEUTIC EXERCISES: CPT | Mod: PN

## 2023-05-03 NOTE — PROGRESS NOTES
"                          Physical Therapy Daily Treatment Note     Name: Anette Villa  Clinic Number: [id number]    Therapy Diagnosis:   Encounter Diagnosis   Name Primary?    Decreased functional mobility Yes     Physician: Heriberto Garcia MD    Visit Date: 5/3/2023  Physician Orders: PT Eval and Treat   Medical Diagnosis from Referral:   M53.3 (ICD-10-CM) - Sacral back pain   M54.31,M54.32 (ICD-10-CM) - Bilateral sciatica      Evaluation Date: 4/24/2023  Authorization Period Expiration: 4/19/24  Plan of Care Expiration: 6/23/23  Visit # / Visits authorized: 1/ 30 + eval  FOTO: 2/10    Time In: 5:00 pm  Time Out: 5:30 pm  Total Billable Time: 30 minutes    Precautions: Standard    Subjective     Patient reports: That she felt a lot better after the evaluation for about half a day, then the pain came back. States that it feels like the pain is not as bad as it was though. Is open to trying dry needling again.  She was compliant with home exercise program.  Response to previous treatment: eval only  Functional change: ongoing    Pain: 0/10  Location: midline low back/sacrum      Objective     Anette received therapeutic exercises to develop strength, endurance, ROM, flexibility, posture, and core stabilization for 30 minutes including:  Modified Piriformis 3 x 30"  Figure 4 stretch 3 x 30"  Walter pose 3 x 30"  Clamshells with RTB 20 x 3"  Supine hip adduction isometric 20 x 5"  Reverse clamshells with yellow ball between knees 20 x 5"    Anette received the following manual therapy techniques: Joint mobilizations, Manual traction, and Soft tissue Mobilization were applied to the: NA for 0 minutes, including:      Anette participated in neuromuscular re-education activities to improve: Balance, Proprioception, and Posture for 0 minutes, including:      Anette participated in dynamic functional therapeutic activities to improve functional performance for 0  minutes, including:      Anette received the following " "supervised modalities after being cleared for contradictions:     Home Exercises Provided and Patient Education Provided:     Education provided:   - Continue HEP as directed by evaluating therapist    Written home exercises provided: Patient instructed to cont prior HEP.  Exercises were reviewed and Bao able to demonstrate them prior to the end of the session.  Anette demonstrated good  understanding of the education provided.     See electronic medical record under Notes-Patient Instructions for exercises provided prior visit.    Assessment     From [facility]: "Anette is a 37 y.o. female referred to outpatient Physical Therapy with a medical diagnosis of sacral back pain. Pt presents with localized tenderness to lower sacrum/coccygeal region. No provocation with lumbar or hip testing and minimal range of motion deficits, mild strength deficits without pain. Pt with positive sacral thrust but negative remaining SI cluster testing. Pt with limited tolerance for sitting, transitional movements, standing and running. Following clearance of contraindications and receiving written and verbal consent, performed dry needling to SIJ with electrical stimulation for pain relief. Pt reported slight improvement following without adverse effects. May consider referral to pelvic floor therapy for internal assessment or additional pain management"    Patient presents today for first follow-up after evaluation. We first went through her HEP exercises, and then added reverse clamshells with good tolerance. Followed with dry needling performed by Janell Cedeño, PT. See her note for needling details. Following needling, patient reported feeling "great" and we held on further exercises. Patient on call during her appointment time for next week and requested changing the appt time, I told her I will leave note with  to look for changes.     Anette Is progressing well towards her goals.   Patient prognosis is Good.   Patient " will continue to benefit from skilled outpatient physical therapy to address the deficits listed in the problem list box on initial evaluation, provide pt/family education and to maximize patient's level of independence in the home and community environment.     Pt's spiritual, cultural and educational needs considered and patient agreeable to plan of care and goals.  Anticipated barriers to physical therapy: None       Short Term Goals (2 Weeks):  1. Pt will be compliant with HEP to supplement PT in decreasing pain with functional mobility. (Progressing, not met)  2. Pt will perform sit to stand with good control to demonstrate improved core strength. (Progressing, not met)  3. Pt will report no pain with sitting with equal weightbearing for 15 minutes (Progressing, not met)     Long Term Goals (4 Weeks):   1. Pt will improve FOTO score to </= 21% limited to decrease perceived limitation with maintaining/changing body position.  (Progressing, not met)  2. Pt will report no tenderness to palpation of SIJ (Progressing, not met)  3. Pt will improve impaired LE MMTs to >/=5/5 to improve strength for functional tasks. (Progressing, not met)  4. Pt will report no pain during sitting during work shift to promote sitting tolerance.  (Progressing, not met)    Plan     Continue progressing BLE and low back mobility/stability exercises as tolerated, monitor response to DN    Plan of care Certification: 4/24/2023 to 6/23/23.     Outpatient Physical Therapy 2 times weekly for 8 weeks    Rustam Cardona, PT, DPT

## 2023-05-03 NOTE — PROGRESS NOTES
Physical Therapy Dry Needling Note       Date:  5/3/2023    Name: Anette Villa  Clinic Number: [id number]    Therapy Diagnosis:   Encounter Diagnosis   Name Primary?    Decreased functional mobility Yes     Physician: Heriberto Garcia MD    Start Time:  5:30  Stop Time: 5:50  Total Billable Time: 20 minutes    Subjective     Pt reports: improved symptoms following dry needling with slightly improved tolerance for sitting.  See note by Rustam Cardona PT, DPT     Pain: 3/10  Location: sacrococcygeal region     Patient verbalized consent to FDN: Yes and No    Objective   Palpation Assessment to determine the necessity for Functional Dry Needling.   Patient provided written and verbal consent to Functional Dry Needling 4/24/23    Anette received the following manual therapy techniques 20 min:   Palpation for dry needling  Functional dry needling to bilateral sacrum using 2 40mm needles bilaterally with electrical stimulation x 15 minutes. Utilized winding and periosteal pecking. Needles removed following without adverse response.       Home Exercises Provided and Patient Education Provided     Education provided:   - continue home exercise program   Written Handout on response to FDN provided: Yes    Anette demonstrated good  understanding of the education provided.     See EMR under Media for exercises provided 5/3/2023.     Assessment     Patient demonstrates lower tissue irritability but ongoing localized tenderness to palpation of sacrococcygeal joint. Performed functional dry needling with electrical stimulation with good tolerance. Pt demonstrated appropriate response to Functional Dry Needling. Decreased pain reported post treatment. See remaining treatment by Rustam Cardona, PT, DPT.     Plan     Monitor response to Functional Dry Needling. Continue with Functional Dry Needling in POC as appropriate    AMBER HENRY PT

## 2023-05-08 ENCOUNTER — HEALTH MAINTENANCE LETTER (OUTPATIENT)
Age: 38
End: 2023-05-08

## 2023-05-17 ENCOUNTER — CLINICAL SUPPORT (OUTPATIENT)
Dept: REHABILITATION | Facility: HOSPITAL | Age: 38
End: 2023-05-17
Payer: COMMERCIAL

## 2023-05-17 DIAGNOSIS — R26.89 DECREASED FUNCTIONAL MOBILITY: Primary | ICD-10-CM

## 2023-05-17 PROCEDURE — 97140 MANUAL THERAPY 1/> REGIONS: CPT | Mod: PN

## 2023-05-17 PROCEDURE — 97110 THERAPEUTIC EXERCISES: CPT | Mod: PN

## 2023-05-17 PROCEDURE — 97014 ELECTRIC STIMULATION THERAPY: CPT | Mod: PN

## 2023-05-17 NOTE — PROGRESS NOTES
"                          Physical Therapy Daily Treatment Note     Name: Anette Villa  Clinic Number: [id number]    Therapy Diagnosis:   Encounter Diagnosis   Name Primary?    Decreased functional mobility Yes     Physician: Heriberto Garcia MD    Visit Date: 5/17/2023  Physician Orders: PT Eval and Treat   Medical Diagnosis from Referral:   M53.3 (ICD-10-CM) - Sacral back pain   M54.31,M54.32 (ICD-10-CM) - Bilateral sciatica      Evaluation Date: 4/24/2023  Authorization Period Expiration: 4/19/24  Plan of Care Expiration: 6/23/23  Visit # / Visits authorized: 2/ 30 + eval  FOTO: 3/10    Time In: 5:00 pm  Time Out: 5:55 pm  Total Billable Time: 55 minutes    Precautions: Standard    Subjective     Patient reports: continues to have tailbone pain. She felt much better after last treatment for about a week but missed PT last week and pain has gradually returned. Wishes to continue dry needling.  She was compliant with home exercise program.  Response to previous treatment: eval only  Functional change: ongoing    Pain: 2/10  Location: midline low back/sacrum      Objective     Anette received therapeutic exercises to develop strength, endurance, ROM, flexibility, posture, and core stabilization for 30 minutes including:  Modified Piriformis 3 x 30"  Figure 4 stretch 3 x 30" NP  Walter pose 3 x 30"  Clamshells with RTB 30 x 3"  Supine hip adduction isometric 30 x 5"  Reverse clamshells with block  between knees 20 x 5"  Bridge 3x10     Anette received the following manual therapy techniques: Joint mobilizations, Manual traction, and Soft tissue Mobilization were applied to the sacrococcygeal joint for 25 minutes, including:  Functional dry needling to bilateral sacrum using 4 40mm needles bilaterally with electrical stimulation x 20 minutes. Utilized winding and periosteal pecking. Needles removed following without adverse response.    Anette received the following supervised modalities after being cleared for " "contradictions:   TENS electrical stimulation for pain to the sacrum. Electrical stimulation was applied to these needles at 4 Hz and a pulse of 250 µseconds for 20 minutes with a nursing home check. reported treatment well without any adverse effects.     Home Exercises Provided and Patient Education Provided:     Education provided:   - Continue HEP as directed by evaluating therapist    Written home exercises provided: Patient instructed to cont prior HEP.  Exercises were reviewed and Bao able to demonstrate them prior to the end of the session.  Anette demonstrated good  understanding of the education provided.     See electronic medical record under Notes-Patient Instructions for exercises provided prior visit.    Assessment     From [facility]: "Anette is a 37 y.o. female referred to outpatient Physical Therapy with a medical diagnosis of sacral back pain. Pt presents with localized tenderness to lower sacrum/coccygeal region. No provocation with lumbar or hip testing and minimal range of motion deficits, mild strength deficits without pain. Pt with positive sacral thrust but negative remaining SI cluster testing. Pt with limited tolerance for sitting, transitional movements, standing and running. Following clearance of contraindications and receiving written and verbal consent, performed dry needling to SIJ with electrical stimulation for pain relief. Pt reported slight improvement following without adverse effects. May consider referral to pelvic floor therapy for internal assessment or additional pain management"    Patient presents with initially good response following previous session but gradual return of pain. Continued dry needling treatment without adverse response. Pt reported 0/10 pain immediately following treatment. Continued stretches for flexibility and added/progressed hip strengthening. Pt continued to report no pain post treatment. Continue to progress as tolerated.      Anette Is progressing " well towards her goals.   Patient prognosis is Good.   Patient will continue to benefit from skilled outpatient physical therapy to address the deficits listed in the problem list box on initial evaluation, provide pt/family education and to maximize patient's level of independence in the home and community environment.     Pt's spiritual, cultural and educational needs considered and patient agreeable to plan of care and goals.  Anticipated barriers to physical therapy: None       Short Term Goals (2 Weeks):  1. Pt will be compliant with HEP to supplement PT in decreasing pain with functional mobility. (Progressing, not met)  2. Pt will perform sit to stand with good control to demonstrate improved core strength. (Progressing, not met)  3. Pt will report no pain with sitting with equal weightbearing for 15 minutes (Progressing, not met)     Long Term Goals (4 Weeks):   1. Pt will improve FOTO score to </= 21% limited to decrease perceived limitation with maintaining/changing body position.  (Progressing, not met)  2. Pt will report no tenderness to palpation of SIJ (Progressing, not met)  3. Pt will improve impaired LE MMTs to >/=5/5 to improve strength for functional tasks. (Progressing, not met)  4. Pt will report no pain during sitting during work shift to promote sitting tolerance.  (Progressing, not met)    Plan     Continue progressing BLE and low back mobility/stability exercises as tolerated, monitor response to DN    Plan of care Certification: 4/24/2023 to 6/23/23.     Outpatient Physical Therapy 2 times weekly for 8 weeks    AMBER HENRY, PT, DPT

## 2023-05-23 ENCOUNTER — PATIENT MESSAGE (OUTPATIENT)
Dept: REHABILITATION | Facility: HOSPITAL | Age: 38
End: 2023-05-23

## 2023-05-31 ENCOUNTER — CLINICAL SUPPORT (OUTPATIENT)
Dept: REHABILITATION | Facility: HOSPITAL | Age: 38
End: 2023-05-31
Payer: COMMERCIAL

## 2023-05-31 DIAGNOSIS — R26.89 DECREASED FUNCTIONAL MOBILITY: Primary | ICD-10-CM

## 2023-05-31 PROCEDURE — 97110 THERAPEUTIC EXERCISES: CPT | Mod: PN

## 2023-05-31 PROCEDURE — 97140 MANUAL THERAPY 1/> REGIONS: CPT | Mod: PN

## 2023-06-09 ENCOUNTER — VIRTUAL VISIT (OUTPATIENT)
Dept: FAMILY MEDICINE | Facility: CLINIC | Age: 38
End: 2023-06-09
Payer: COMMERCIAL

## 2023-06-09 DIAGNOSIS — E66.812 CLASS 2 SEVERE OBESITY WITH SERIOUS COMORBIDITY AND BODY MASS INDEX (BMI) OF 35.0 TO 35.9 IN ADULT, UNSPECIFIED OBESITY TYPE (H): Primary | ICD-10-CM

## 2023-06-09 DIAGNOSIS — E66.01 CLASS 2 SEVERE OBESITY WITH SERIOUS COMORBIDITY AND BODY MASS INDEX (BMI) OF 35.0 TO 35.9 IN ADULT, UNSPECIFIED OBESITY TYPE (H): Primary | ICD-10-CM

## 2023-06-09 DIAGNOSIS — K21.9 GASTROESOPHAGEAL REFLUX DISEASE, UNSPECIFIED WHETHER ESOPHAGITIS PRESENT: ICD-10-CM

## 2023-06-09 PROCEDURE — 99215 OFFICE O/P EST HI 40 MIN: CPT | Mod: VID | Performed by: PHYSICIAN ASSISTANT

## 2023-06-09 RX ORDER — PHENTERMINE HYDROCHLORIDE 15 MG/1
15 CAPSULE ORAL EVERY MORNING
Qty: 30 CAPSULE | Refills: 1 | Status: SHIPPED | OUTPATIENT
Start: 2023-06-09 | End: 2023-07-25

## 2023-06-09 NOTE — PROGRESS NOTES
Tessy is a 37 year old who is being evaluated via a billable video visit.      How would you like to obtain your AVS? MyChart  If the video visit is dropped, the invitation should be resent by: Text to cell phone: [phone]  Will anyone else be joining your video visit? No      Assessment & Plan       ICD-10-CM    1. Class 2 severe obesity with serious comorbidity and body mass index (BMI) of 35.0 to 35.9 in adult, unspecified obesity type (H)  E66.01 phentermine (ADIPEX-P) 15 MG capsule    Z68.35       2. Gastroesophageal reflux disease, unspecified whether esophagitis present  K21.9         Discussed weight loss and medications  Reviewed different medications, side effects, pros/cons  Discussed unclear at this point if these medications are going to be needed long term but need to assume that it isn't a 'quick fix,' that lifestyle changes still need to be an ongoing part of the treatment plan, and that it may be a medication that is needed on/off or intermittently throughout life  After discussing various medications, given patient notes issues with cravings and appetite suppression, felt a trial of phentermine may be a good med to try first      41 minutes spent by me on the date of the encounter doing chart review, patient visit and documentation        Baylee Soria PA-C  [facility] FLORYSOUMYA Still is a 37 year old, presenting for the following health issues:  No chief complaint on file.         View : No data to display.              HPI     Weight gain - talk about weight loss medications    Weight has gone up and down her entire life  Has been successful losing weight but then gaining it back    Currently at the highest weight she has been since pregnancy  Has been trying for a year or more to lose weight - trying everything that she has used before that had worked but none of it is working  Trying to exercise more consistently - admits that is difficult with kids and working  "full time  Getting nervous that she is not going to be able to get it under control   Did meet with a nutritionist which she did feel was helpful but still not seeing results. Does continue to implement those things    No longer has a set number in her head that she wants to get to  Just wants to feel healthy - be able to go on hikes, keep up with her kids  Has a background with working out and making that a part of her life  Just feels like if she can have something that helps with cravings and appetite suppression    Starting weight: 190lb  Height: 61.25\"  BMI: 35.61        Review of Systems   Remainder of ROS obtained and found to be negative other than that which was documented above        Objective           Vitals:  No vitals were obtained today due to virtual visit.    Physical Exam   GENERAL: Healthy, alert and no distress  EYES: Eyes grossly normal to inspection.  No discharge or erythema, or obvious scleral/conjunctival abnormalities.  RESP: No audible wheeze, cough, or visible cyanosis.  No visible retractions or increased work of breathing.    SKIN: Visible skin clear. No significant rash, abnormal pigmentation or lesions.  NEURO: Cranial nerves grossly intact.  Mentation and speech appropriate for age.  PSYCH: Mentation appears normal, affect normal/bright, judgement and insight intact, normal speech and appearance well-groomed.            Video-Visit Details    Type of service:  Video Visit     Originating Location (pt. Location): Home  Distant Location (provider location):  On-site  Platform used for Video Visit: Ericka    "

## 2023-06-19 ENCOUNTER — OFFICE VISIT (OUTPATIENT)
Dept: CARDIOLOGY | Facility: CLINIC | Age: 38
End: 2023-06-19
Payer: COMMERCIAL

## 2023-06-19 VITALS
OXYGEN SATURATION: 100 % | HEIGHT: 64 IN | SYSTOLIC BLOOD PRESSURE: 142 MMHG | DIASTOLIC BLOOD PRESSURE: 94 MMHG | BODY MASS INDEX: 39.03 KG/M2 | WEIGHT: 228.63 LBS | HEART RATE: 60 BPM

## 2023-06-19 DIAGNOSIS — Z91.89 AT RISK FOR CORONARY ARTERY DISEASE: ICD-10-CM

## 2023-06-19 DIAGNOSIS — Z82.49 FAMILY HISTORY OF PREMATURE CAD: ICD-10-CM

## 2023-06-19 DIAGNOSIS — E66.01 MORBID OBESITY WITH BMI OF 40.0-44.9, ADULT: ICD-10-CM

## 2023-06-19 DIAGNOSIS — I10 PRIMARY HYPERTENSION: Primary | ICD-10-CM

## 2023-06-19 DIAGNOSIS — K76.0 NAFLD (NONALCOHOLIC FATTY LIVER DISEASE): ICD-10-CM

## 2023-06-19 DIAGNOSIS — Z13.6 ENCOUNTER FOR SCREENING FOR CARDIOVASCULAR DISORDERS: ICD-10-CM

## 2023-06-19 PROCEDURE — 1159F PR MEDICATION LIST DOCUMENTED IN MEDICAL RECORD: ICD-10-PCS | Mod: CPTII,S$GLB,, | Performed by: INTERNAL MEDICINE

## 2023-06-19 PROCEDURE — 1159F MED LIST DOCD IN RCRD: CPT | Mod: CPTII,S$GLB,, | Performed by: INTERNAL MEDICINE

## 2023-06-19 PROCEDURE — 1160F RVW MEDS BY RX/DR IN RCRD: CPT | Mod: CPTII,S$GLB,, | Performed by: INTERNAL MEDICINE

## 2023-06-19 PROCEDURE — 99999 PR PBB SHADOW E&M-EST. PATIENT-LVL III: CPT | Mod: PBBFAC,,, | Performed by: INTERNAL MEDICINE

## 2023-06-19 PROCEDURE — 3080F PR MOST RECENT DIASTOLIC BLOOD PRESSURE >= 90 MM HG: ICD-10-PCS | Mod: CPTII,S$GLB,, | Performed by: INTERNAL MEDICINE

## 2023-06-19 PROCEDURE — 3077F PR MOST RECENT SYSTOLIC BLOOD PRESSURE >= 140 MM HG: ICD-10-PCS | Mod: CPTII,S$GLB,, | Performed by: INTERNAL MEDICINE

## 2023-06-19 PROCEDURE — 99999 PR PBB SHADOW E&M-EST. PATIENT-LVL III: ICD-10-PCS | Mod: PBBFAC,,, | Performed by: INTERNAL MEDICINE

## 2023-06-19 PROCEDURE — 3008F PR BODY MASS INDEX (BMI) DOCUMENTED: ICD-10-PCS | Mod: CPTII,S$GLB,, | Performed by: INTERNAL MEDICINE

## 2023-06-19 PROCEDURE — 99204 PR OFFICE/OUTPT VISIT, NEW, LEVL IV, 45-59 MIN: ICD-10-PCS | Mod: S$GLB,,, | Performed by: INTERNAL MEDICINE

## 2023-06-19 PROCEDURE — 3080F DIAST BP >= 90 MM HG: CPT | Mod: CPTII,S$GLB,, | Performed by: INTERNAL MEDICINE

## 2023-06-19 PROCEDURE — 3077F SYST BP >= 140 MM HG: CPT | Mod: CPTII,S$GLB,, | Performed by: INTERNAL MEDICINE

## 2023-06-19 PROCEDURE — 99204 OFFICE O/P NEW MOD 45 MIN: CPT | Mod: S$GLB,,, | Performed by: INTERNAL MEDICINE

## 2023-06-19 PROCEDURE — 3008F BODY MASS INDEX DOCD: CPT | Mod: CPTII,S$GLB,, | Performed by: INTERNAL MEDICINE

## 2023-06-19 PROCEDURE — 1160F PR REVIEW ALL MEDS BY PRESCRIBER/CLIN PHARMACIST DOCUMENTED: ICD-10-PCS | Mod: CPTII,S$GLB,, | Performed by: INTERNAL MEDICINE

## 2023-06-19 NOTE — PROGRESS NOTES
"Subjective:   Patient ID:  Anette Villa is a 37 y.o. female is a new patient who presents for evaluation of Establish Care, Follow-up, and Chest Pain    HPI:   2 vaginal deliveries and weight second she has 3rd trimester HTN (no preclampsia) and delivered vaginally  Father  of MI at 60 and mother has CAD (calcification)  Patient is a bed side nurse  Very active  Hiking, riding and   Loosing weight she in on ozempic and on Nutri-system (lost 70 pounds) for about a year.   She has heavy menstural periods    Patient Active Problem List   Diagnosis    Morbid obesity with BMI of 40.0-44.9, adult    Allergic rhinitis    Patient is a currently breast-feeding mother    Panic attacks    Binge eating    Post-trauma response    Anxiety    NAFLD (nonalcoholic fatty liver disease)    Iron deficiency anemia secondary to inadequate dietary iron intake    Nausea in adult    Abdominal pain, epigastric    Macular dystrophy    Primary hypertension    Bilateral sciatica    Sacral back pain    Decreased functional mobility     BP (!) 142/94 (BP Location: Left arm, Patient Position: Sitting, BP Method: Large (Automatic))   Pulse 60   Ht 5' 4" (1.626 m)   Wt 103.7 kg (228 lb 9.9 oz)   SpO2 100%   BMI 39.24 kg/m²   Body mass index is 39.24 kg/m².  CrCl cannot be calculated (Patient's most recent lab result is older than the maximum 7 days allowed.).    Lab Results   Component Value Date     2019    K 4.4 2019     2019    CO2 24 2019    BUN 16 2019    CREATININE 0.8 2019    GLU 89 2019    HGBA1C 5.0 2016    AST 21 2019    ALT 20 2019    ALBUMIN 4.3 2019    PROT 7.8 2019    BILITOT 0.3 2019    WBC 7.47 2017    HGB 13.8 2017    HCT 42.7 2017    MCV 84 2017     2017    TSH 0.446 2019    CHOL 153 2019    HDL 36 (L) 2019    LDLCALC 88.0 2019    TRIG 145 2019       Current " Outpatient Medications   Medication Sig    amLODIPine (NORVASC) 5 MG tablet Take 5 mg by mouth.    budesonide 1 mg/2 mL [facility] Add 1 ampule to nasal rinse bottle daily.    cetirizine (ZYRTEC) 10 MG tablet Take 1 tablet (10 mg total) by mouth daily as needed for Allergies.    ondansetron (ZOFRAN) 4 MG tablet Take 4 mg by mouth every 4 (four) hours.     No current facility-administered medications for this visit.       Review of Systems   Constitutional: Negative for chills, decreased appetite, malaise/fatigue, night sweats, weight gain and weight loss.   Eyes:  Negative for blurred vision, double vision, visual disturbance and visual halos.   Cardiovascular:  Negative for chest pain, claudication, cyanosis, dyspnea on exertion, irregular heartbeat, leg swelling, near-syncope, orthopnea, palpitations, paroxysmal nocturnal dyspnea and syncope.   Respiratory:  Negative for cough, hemoptysis, snoring, sputum production and wheezing.    Endocrine: Negative for cold intolerance, heat intolerance, polydipsia and polyphagia.   Hematologic/Lymphatic: Negative for adenopathy and bleeding problem. Does not bruise/bleed easily.   Skin:  Negative for flushing, itching, poor wound healing and rash.   Musculoskeletal:  Negative for arthritis, back pain, falls, gout, joint pain, joint swelling, muscle cramps, muscle weakness, myalgias, neck pain and stiffness.   Gastrointestinal:  Negative for bloating, abdominal pain, anorexia, diarrhea, dysphagia, excessive appetite, flatus, hematemesis, jaundice, melena and nausea.   Genitourinary:  Negative for hesitancy and incomplete emptying.   Neurological:  Negative for aphonia, brief paralysis, difficulty with concentration, disturbances in coordination, excessive daytime sleepiness, dizziness, focal weakness, light-headedness, loss of balance and weakness.   Psychiatric/Behavioral:  Negative for altered mental status, depression, hallucinations, hypervigilance, memory loss, substance abuse  and suicidal ideas. The patient does not have insomnia and is not nervous/anxious.      Objective:   Physical Exam  Constitutional:       General: She is not in acute distress.     Appearance: She is well-developed. She is not diaphoretic.   HENT:      Head: Normocephalic and atraumatic.      Nose: Nose normal.      Mouth/Throat:      Pharynx: No oropharyngeal exudate.   Eyes:      General: No scleral icterus.        Right eye: No discharge.         Left eye: No discharge.      Conjunctiva/sclera: Conjunctivae normal.      Pupils: Pupils are equal, round, and reactive to light.   Neck:      Thyroid: No thyromegaly.      Vascular: No JVD.      Trachea: No tracheal deviation.   Cardiovascular:      Rate and Rhythm: Normal rate and regular rhythm.      Pulses: Intact distal pulses.      Heart sounds: Normal heart sounds. No murmur heard.    No friction rub. No gallop.   Pulmonary:      Effort: Pulmonary effort is normal. No respiratory distress.      Breath sounds: Normal breath sounds. No stridor. No wheezing or rales.   Chest:      Chest wall: No tenderness.   Abdominal:      General: Bowel sounds are normal. There is no distension.      Palpations: Abdomen is soft. There is no mass.      Tenderness: There is no abdominal tenderness. There is no guarding or rebound.   Musculoskeletal:         General: No tenderness. Normal range of motion.      Cervical back: Normal range of motion and neck supple.   Lymphadenopathy:      Cervical: No cervical adenopathy.   Skin:     General: Skin is warm.      Coloration: Skin is not pale.      Findings: No erythema or rash.   Neurological:      Mental Status: She is alert and oriented to person, place, and time.      Cranial Nerves: No cranial nerve deficit.      Motor: No abnormal muscle tone.      Coordination: Coordination normal.      Deep Tendon Reflexes: Reflexes are normal and symmetric. Reflexes normal.   Psychiatric:         Behavior: Behavior normal.         Thought  Content: Thought content normal.         Judgment: Judgment normal.       Assessment:     1. Primary hypertension    2. Morbid obesity with BMI of 40.0-44.9, adult    3. NAFLD (nonalcoholic fatty liver disease)    4. At risk for coronary artery disease    5. Family history of premature CAD    6. Encounter for screening for cardiovascular disorders      Plan:   Anette was seen today for establish care, follow-up and chest pain.    Diagnoses and all orders for this visit:    Primary hypertension    Morbid obesity with BMI of 40.0-44.9, adult    NAFLD (nonalcoholic fatty liver disease)    At risk for coronary artery disease    Family history of premature CAD  -     LIPOPROTEIN A (LPA); Future  -     CRP, High Sensitivity; Future  -     Apolipoprotein B; Future  -     Lipid Panel; Future    Encounter for screening for cardiovascular disorders  -     CT Calcium Scoring Cardiac; Future      Counseled on importance of heart healthy diet low in saturated and trans fat and salt as well gradually starting a regular aerobic exercise regimen with goal of 30min 5x/week. Recommend BP diary. Call if systolic BP > 130 mmHg on checking repeatedly

## 2023-06-21 ENCOUNTER — PATIENT OUTREACH (OUTPATIENT)
Dept: ADMINISTRATIVE | Facility: HOSPITAL | Age: 38
End: 2023-06-21
Payer: COMMERCIAL

## 2023-06-21 ENCOUNTER — PATIENT MESSAGE (OUTPATIENT)
Dept: ADMINISTRATIVE | Facility: HOSPITAL | Age: 38
End: 2023-06-21
Payer: COMMERCIAL

## 2023-06-21 NOTE — PROGRESS NOTES
Care Everywhere updates requested and reviewed.  Immunizations reconciled. Media reports reviewed.  Duplicate HM overrides and  orders removed.  Overdue HM topic chart audit and/or requested.  Overdue lab testing linked to upcoming lab appointments if applies.      Health Maintenance Due   Topic Date Due    Hemoglobin A1c (Diabetic Prevention Screening)  2021    COVID-19 Vaccine (4 - Pfizer series) 2021    Cervical Cancer Screening  2022

## 2023-06-21 NOTE — LETTER
June 28, 2023    Anette Villa  [address] [place] [address]             [facility]  [address]  [facility] [address]  Phone: [phone] Dear Meghan [facility] is committed to your overall health.  To help you get the most out of each of your visits, we will review your information to make sure you are up to date on all of your recommended tests and/or procedures.       Heriberto Garcia MD  has found that your chart shows you may be due for :         Health Maintenance Due   Topic    Hemoglobin A1c (Diabetic Prevention Screening)     COVID-19 Vaccine (4 - Pfizer series)    Cervical Cancer Screening          If you have had any of the above done at another facility, please bring the records or information with you so that your record at [facility] will be complete.  If you would like to schedule any of these test, please call [phone] or send a message via [email or web address].        If you are currently taking medication, please bring it with you to your appointment for review.           Thank you for letting us care for you,        Heriberto Garcia MD and your [facility] Team

## 2023-06-28 ENCOUNTER — PATIENT OUTREACH (OUTPATIENT)
Dept: ADMINISTRATIVE | Facility: HOSPITAL | Age: 38
End: 2023-06-28
Payer: COMMERCIAL

## 2023-07-04 ENCOUNTER — OFFICE VISIT (OUTPATIENT)
Dept: URGENT CARE | Facility: CLINIC | Age: 38
End: 2023-07-04
Payer: COMMERCIAL

## 2023-07-04 VITALS
BODY MASS INDEX: 38.93 KG/M2 | OXYGEN SATURATION: 96 % | SYSTOLIC BLOOD PRESSURE: 124 MMHG | HEIGHT: 64 IN | RESPIRATION RATE: 19 BRPM | WEIGHT: 228 LBS | TEMPERATURE: 99 F | HEART RATE: 76 BPM | DIASTOLIC BLOOD PRESSURE: 86 MMHG

## 2023-07-04 DIAGNOSIS — R05.9 COUGH, UNSPECIFIED TYPE: ICD-10-CM

## 2023-07-04 DIAGNOSIS — B96.89 ACUTE BACTERIAL SINUSITIS: Primary | ICD-10-CM

## 2023-07-04 DIAGNOSIS — J01.90 ACUTE BACTERIAL SINUSITIS: Primary | ICD-10-CM

## 2023-07-04 LAB
CTP QC/QA: YES
CTP QC/QA: YES
POC MOLECULAR INFLUENZA A AGN: NEGATIVE
POC MOLECULAR INFLUENZA B AGN: NEGATIVE
SARS-COV-2 AG RESP QL IA.RAPID: NEGATIVE

## 2023-07-04 PROCEDURE — 87502 POCT INFLUENZA A/B MOLECULAR: ICD-10-PCS | Mod: QW,S$GLB,, | Performed by: NURSE PRACTITIONER

## 2023-07-04 PROCEDURE — 99213 PR OFFICE/OUTPT VISIT, EST, LEVL III, 20-29 MIN: ICD-10-PCS | Mod: S$GLB,,, | Performed by: NURSE PRACTITIONER

## 2023-07-04 PROCEDURE — 87811 SARS CORONAVIRUS 2 ANTIGEN POCT, MANUAL READ: ICD-10-PCS | Mod: QW,S$GLB,, | Performed by: NURSE PRACTITIONER

## 2023-07-04 PROCEDURE — 87502 INFLUENZA DNA AMP PROBE: CPT | Mod: QW,S$GLB,, | Performed by: NURSE PRACTITIONER

## 2023-07-04 PROCEDURE — 99213 OFFICE O/P EST LOW 20 MIN: CPT | Mod: S$GLB,,, | Performed by: NURSE PRACTITIONER

## 2023-07-04 PROCEDURE — 87811 SARS-COV-2 COVID19 W/OPTIC: CPT | Mod: QW,S$GLB,, | Performed by: NURSE PRACTITIONER

## 2023-07-04 RX ORDER — AMOXICILLIN AND CLAVULANATE POTASSIUM 875; 125 MG/1; MG/1
1 TABLET, FILM COATED ORAL EVERY 12 HOURS
Qty: 14 TABLET | Refills: 0 | Status: SHIPPED | OUTPATIENT
Start: 2023-07-04 | End: 2023-07-11

## 2023-07-04 RX ORDER — PREDNISONE 20 MG/1
20 TABLET ORAL DAILY
Qty: 5 TABLET | Refills: 0 | Status: SHIPPED | OUTPATIENT
Start: 2023-07-04 | End: 2023-07-09

## 2023-07-04 RX ORDER — BENZONATATE 100 MG/1
100 CAPSULE ORAL 3 TIMES DAILY PRN
Qty: 30 CAPSULE | Refills: 0 | Status: SHIPPED | OUTPATIENT
Start: 2023-07-04 | End: 2023-07-14

## 2023-07-04 NOTE — PROGRESS NOTES
"Subjective:      Patient ID: Anette Villa is a 37 y.o. female.    Vitals:  height is 5' 4" (1.626 m) and weight is 103.4 kg (228 lb). Her temperature is 98.5 °F (36.9 °C). Her blood pressure is 124/86 and her pulse is 76. Her respiration is 19 and oxygen saturation is 96%.     Chief Complaint: Cough    This is a 37 y.o. female who presents today with a chief complaint of Cough, Congestion, Sinus Headache, sinus pressure, Ear pain, Sore throat, body aches and fatigued X 1 week. Pt stated she has felt worse in the last few days.  Patient with history of sinusitis, taking her sinus medication prescribed by her allergist with mild relief, denies fever, or chills, denies wheezing or shortness of breath, denies nausea, vomiting, diarrhea or abdominal pain, denies chest pain or dizziness positional lightheadedness, denies trouble swallowing, denies loss of taste or smell, or any other symptoms        Cough  This is a new problem. The current episode started in the past 7 days. The problem has been gradually worsening. The problem occurs hourly. The cough is Productive of sputum. Associated symptoms include ear pain, headaches, nasal congestion, postnasal drip and a sore throat. Pertinent negatives include no chest pain, fever or shortness of breath. Nothing aggravates the symptoms. She has tried OTC cough suppressant (ibuprofen) for the symptoms. The treatment provided mild relief. There is no history of asthma, bronchitis or COPD.     Constitution: Negative for fever.   HENT:  Positive for ear pain, congestion, postnasal drip, sinus pain, sinus pressure and sore throat.    Cardiovascular:  Negative for chest pain.   Respiratory:  Positive for cough. Negative for shortness of breath.    Neurological:  Positive for headaches.    Objective:     Physical Exam   Constitutional: She is oriented to person, place, and time. She appears well-developed. She is cooperative.  Non-toxic appearance. She does not appear ill. No " distress.   HENT:   Head: Normocephalic and atraumatic.   Ears:   Right Ear: Hearing, tympanic membrane, external ear and ear canal normal. Tympanic membrane is not injected. No middle ear effusion.   Left Ear: Hearing, tympanic membrane, external ear and ear canal normal. Tympanic membrane is not injected.  No middle ear effusion.   Nose: No mucosal edema, rhinorrhea or nasal deformity. No epistaxis. Right sinus exhibits maxillary sinus tenderness. Right sinus exhibits no frontal sinus tenderness. Left sinus exhibits maxillary sinus tenderness. Left sinus exhibits no frontal sinus tenderness.   Mouth/Throat: Uvula is midline, oropharynx is clear and moist and mucous membranes are normal. No trismus in the jaw. Normal dentition. No uvula swelling. No oropharyngeal exudate, posterior oropharyngeal edema, posterior oropharyngeal erythema, tonsillar abscesses or cobblestoning.   Eyes: Conjunctivae and lids are normal. No scleral icterus. Extraocular movement intact   Neck: Trachea normal and phonation normal. Neck supple. No edema present. No erythema present. No neck rigidity present.   Cardiovascular: Normal rate, regular rhythm, normal heart sounds and normal pulses.   Pulmonary/Chest: Effort normal and breath sounds normal. No stridor. No respiratory distress. She has no decreased breath sounds. She has no wheezes. She has no rhonchi. She has no rales.   Abdominal: Normal appearance.   Musculoskeletal: Normal range of motion.         General: No deformity. Normal range of motion.   Lymphadenopathy:     She has no cervical adenopathy.   Neurological: no focal deficit. She is alert and oriented to person, place, and time. She exhibits normal muscle tone. Coordination normal.   Skin: Skin is warm, dry, intact, not diaphoretic and not pale.   Psychiatric: Her speech is normal and behavior is normal. Judgment and thought content normal.   Nursing note and vitals reviewed.  Results for orders placed or performed in visit  on 07/04/23   SARS Coronavirus 2 Antigen, POCT Manual Read   Result Value Ref Range    SARS Coronavirus 2 Antigen Negative Negative     Acceptable Yes    POCT Influenza A/B MOLECULAR   Result Value Ref Range    POC Molecular Influenza A Ag Negative Negative, Not Reported    POC Molecular Influenza B Ag Negative Negative, Not Reported     Acceptable Yes          Patient in no acute distress.  Vitals reassuring.  Discussed results/diagnosis/plan in depth with patient in clinic. Strict precautions given to patient to monitor for worsening signs and symptoms. Advised to follow up with primary.All questions answered. Strict ER precautions given. If your symptoms worsens or fail to improve you should go to the Emergency Room. Discharge and follow-up instructions given verbally/printed. Discharge and follow-up instructions discussed with the patient who expressed understanding and willingness to comply with my recommendations.Patient voiced understanding and in agreement with current treatment plan.     Please be advised this text was dictated with Regalii software and may contain errors due to translation.    Assessment:     1. Acute bacterial sinusitis    2. Cough, unspecified type        Plan:       Acute bacterial sinusitis  -     SARS Coronavirus 2 Antigen, POCT Manual Read  -     POCT Influenza A/B MOLECULAR  -     amoxicillin-clavulanate 875-125mg (AUGMENTIN) 875-125 mg per tablet; Take 1 tablet by mouth every 12 (twelve) hours. for 7 days  Dispense: 14 tablet; Refill: 0    Cough, unspecified type  -     benzonatate (TESSALON) 100 MG capsule; Take 1 capsule (100 mg total) by mouth 3 (three) times daily as needed for Cough.  Dispense: 30 capsule; Refill: 0    Other orders  -     predniSONE (DELTASONE) 20 MG tablet; Take 1 tablet (20 mg total) by mouth once daily. for 5 days  Dispense: 5 tablet; Refill: 0          Medical Decision Making:   Clinical Tests:   Lab Tests: Reviewed  Urgent  Care Management:  Patient in no acute distress.  Vitals reassuring.  On exam, patient is nontoxic appearing and afebrile.  Lungs CTA.  Negative COVID-19 and flu test results discussed with patient detail.  Detailed education provided about COVID 19 precautions and recommendations as per CDC guidelines.  Patient with history of recurrent sinusitis.  Requesting oral steroid as it does help with antibiotics for sinusitis.  Risks vs benefits of steroid use discussed with pt. Pt understands and would like to proceed with oral steroids. Medication prescribed and over-the-counter medication discussed with patient at length.  Proper hydration advised.  I reiterated the importance of further evaluation if no improvement symptoms and follow-up with primary. Patient voiced understanding and in agreement with current treatment plan.             Patient Instructions   PLEASE READ YOUR DISCHARGE INSTRUCTIONS ENTIRELY AS IT CONTAINS IMPORTANT INFORMATION.    Try over the counter afrin, but for NO LONGER than 3 days as it can cause rebound congestion. Then you can switch to flonase if you find the nasal sprays are working well.     If you find this dries your nose out or your nose bleeds, try using over the counter nasal saline a few minutes prior to using the flonase to moisten the lining of your nose and throughout the day as needed.     Please take an over the counter antihistamine medication (allegra/Claritin/Zyrtec) of your choice as directed and mucinex-D (if it gives you funny heartbeats you can switch to regular mucinex).     You can try breathe right strips at night to help you breathe.  A cool mist humidifier in bedroom may help with cough and relieve stuffy nose.     Sore throat recommendations: Warm fluids, warm salt water gargles, throat lozenges, tea, honey, soup, rest, hydration.     Sinus rinses DO NOT USE TAP WATER, if you must, water must be a rolling boil for 1 minute, let it cool, then use.  May use distilled  water, or over the counter nasal saline rinses.  Vics vapor rub in shower to help open nasal passages.  May use nasal gel to keep passages moisturized.  May use Nasal saline sprays during the day for added relief of congestion.   For those who go to the gym, please do not use the sauna or steam room now to clear sinuses.    During pollen season, change shirt if you are outside for a while when you go in.  Also wash your face.  Do not touch your face with your hands.  Wash your hands often in general while ill, avoid face contact with hands. Good nutrition. Lots of rest. Plenty of fluids    Over the counter you can use Tylenol (acetominophen) or Ibuprofen for your minor aches and pains as long as you have no contraindications.        Please return or see your primary care doctor if you develop new or worsening symptoms.     Please arrange follow up with your primary medical clinic as soon as possible. You must understand that you've received an Urgent Care treatment only and that you may be released before all of your medical problems are known or treated. You, the patient, will arrange for follow up as instructed. If your symptoms worsen or fail to improve you should go to the Emergency Room.

## 2023-07-05 ENCOUNTER — OFFICE VISIT (OUTPATIENT)
Dept: FAMILY MEDICINE | Facility: CLINIC | Age: 38
End: 2023-07-05
Attending: FAMILY MEDICINE
Payer: COMMERCIAL

## 2023-07-05 VITALS
OXYGEN SATURATION: 99 % | TEMPERATURE: 98 F | SYSTOLIC BLOOD PRESSURE: 120 MMHG | DIASTOLIC BLOOD PRESSURE: 70 MMHG | BODY MASS INDEX: 38.58 KG/M2 | HEIGHT: 64 IN | HEART RATE: 82 BPM | WEIGHT: 226 LBS

## 2023-07-05 DIAGNOSIS — K76.0 NAFLD (NONALCOHOLIC FATTY LIVER DISEASE): ICD-10-CM

## 2023-07-05 DIAGNOSIS — I10 PRIMARY HYPERTENSION: ICD-10-CM

## 2023-07-05 DIAGNOSIS — Z00.00 ROUTINE GENERAL MEDICAL EXAMINATION AT HEALTH CARE FACILITY: Primary | ICD-10-CM

## 2023-07-05 DIAGNOSIS — E66.01 MORBID OBESITY WITH BMI OF 40.0-44.9, ADULT: ICD-10-CM

## 2023-07-05 PROCEDURE — 3074F PR MOST RECENT SYSTOLIC BLOOD PRESSURE < 130 MM HG: ICD-10-PCS | Mod: CPTII,S$GLB,, | Performed by: FAMILY MEDICINE

## 2023-07-05 PROCEDURE — 1159F PR MEDICATION LIST DOCUMENTED IN MEDICAL RECORD: ICD-10-PCS | Mod: CPTII,S$GLB,, | Performed by: FAMILY MEDICINE

## 2023-07-05 PROCEDURE — 3008F PR BODY MASS INDEX (BMI) DOCUMENTED: ICD-10-PCS | Mod: CPTII,S$GLB,, | Performed by: FAMILY MEDICINE

## 2023-07-05 PROCEDURE — 1160F RVW MEDS BY RX/DR IN RCRD: CPT | Mod: CPTII,S$GLB,, | Performed by: FAMILY MEDICINE

## 2023-07-05 PROCEDURE — 99395 PREV VISIT EST AGE 18-39: CPT | Mod: S$GLB,,, | Performed by: FAMILY MEDICINE

## 2023-07-05 PROCEDURE — 3008F BODY MASS INDEX DOCD: CPT | Mod: CPTII,S$GLB,, | Performed by: FAMILY MEDICINE

## 2023-07-05 PROCEDURE — 99395 PR PREVENTIVE VISIT,EST,18-39: ICD-10-PCS | Mod: S$GLB,,, | Performed by: FAMILY MEDICINE

## 2023-07-05 PROCEDURE — 99999 PR PBB SHADOW E&M-EST. PATIENT-LVL III: CPT | Mod: PBBFAC,,, | Performed by: FAMILY MEDICINE

## 2023-07-05 PROCEDURE — 1160F PR REVIEW ALL MEDS BY PRESCRIBER/CLIN PHARMACIST DOCUMENTED: ICD-10-PCS | Mod: CPTII,S$GLB,, | Performed by: FAMILY MEDICINE

## 2023-07-05 PROCEDURE — 3074F SYST BP LT 130 MM HG: CPT | Mod: CPTII,S$GLB,, | Performed by: FAMILY MEDICINE

## 2023-07-05 PROCEDURE — 3078F PR MOST RECENT DIASTOLIC BLOOD PRESSURE < 80 MM HG: ICD-10-PCS | Mod: CPTII,S$GLB,, | Performed by: FAMILY MEDICINE

## 2023-07-05 PROCEDURE — 99999 PR PBB SHADOW E&M-EST. PATIENT-LVL III: ICD-10-PCS | Mod: PBBFAC,,, | Performed by: FAMILY MEDICINE

## 2023-07-05 PROCEDURE — 3078F DIAST BP <80 MM HG: CPT | Mod: CPTII,S$GLB,, | Performed by: FAMILY MEDICINE

## 2023-07-05 PROCEDURE — 1159F MED LIST DOCD IN RCRD: CPT | Mod: CPTII,S$GLB,, | Performed by: FAMILY MEDICINE

## 2023-07-05 RX ORDER — AMLODIPINE BESYLATE 5 MG/1
5 TABLET ORAL DAILY
Qty: 90 TABLET | Refills: 3 | Status: SHIPPED | OUTPATIENT
Start: 2023-07-05 | End: 2023-08-25 | Stop reason: SDUPTHER
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
## 2023-07-06 ENCOUNTER — LAB VISIT (OUTPATIENT)
Dept: LAB | Facility: HOSPITAL | Age: 38
End: 2023-07-06
Attending: FAMILY MEDICINE
Payer: COMMERCIAL

## 2023-07-06 DIAGNOSIS — K76.0 NAFLD (NONALCOHOLIC FATTY LIVER DISEASE): ICD-10-CM

## 2023-07-06 DIAGNOSIS — Z00.00 ROUTINE GENERAL MEDICAL EXAMINATION AT HEALTH CARE FACILITY: ICD-10-CM

## 2023-07-06 DIAGNOSIS — I10 PRIMARY HYPERTENSION: ICD-10-CM

## 2023-07-06 DIAGNOSIS — E66.01 MORBID OBESITY WITH BMI OF 40.0-44.9, ADULT: ICD-10-CM

## 2023-07-06 LAB
ALBUMIN SERPL BCP-MCNC: 4.2 G/DL (ref 3.5–5.2)
ALP SERPL-CCNC: 69 U/L (ref 55–135)
ALT SERPL W/O P-5'-P-CCNC: 15 U/L (ref 10–44)
ANION GAP SERPL CALC-SCNC: 10 MMOL/L (ref 8–16)
AST SERPL-CCNC: 16 U/L (ref 10–40)
BASOPHILS # BLD AUTO: 0.04 K/UL (ref 0–0.2)
BASOPHILS NFR BLD: 0.7 % (ref 0–1.9)
BILIRUB SERPL-MCNC: 0.4 MG/DL (ref 0.1–1)
BUN SERPL-MCNC: 14 MG/DL (ref 6–20)
CALCIUM SERPL-MCNC: 9.7 MG/DL (ref 8.7–10.5)
CHLORIDE SERPL-SCNC: 108 MMOL/L (ref 95–110)
CHOLEST SERPL-MCNC: 148 MG/DL (ref 120–199)
CHOLEST/HDLC SERPL: 4.2 {RATIO} (ref 2–5)
CO2 SERPL-SCNC: 23 MMOL/L (ref 23–29)
CREAT SERPL-MCNC: 0.8 MG/DL (ref 0.5–1.4)
DIFFERENTIAL METHOD: NORMAL
EOSINOPHIL # BLD AUTO: 0.2 K/UL (ref 0–0.5)
EOSINOPHIL NFR BLD: 4.1 % (ref 0–8)
ERYTHROCYTE [DISTWIDTH] IN BLOOD BY AUTOMATED COUNT: 12.3 % (ref 11.5–14.5)
EST. GFR  (NO RACE VARIABLE): >60 ML/MIN/1.73 M^2
ESTIMATED AVG GLUCOSE: 88 MG/DL (ref 68–131)
GLUCOSE SERPL-MCNC: 90 MG/DL (ref 70–110)
HBA1C MFR BLD: 4.7 % (ref 4–5.6)
HCT VFR BLD AUTO: 40.8 % (ref 37–48.5)
HDLC SERPL-MCNC: 35 MG/DL (ref 40–75)
HDLC SERPL: 23.6 % (ref 20–50)
HGB BLD-MCNC: 13.6 G/DL (ref 12–16)
IMM GRANULOCYTES # BLD AUTO: 0 K/UL (ref 0–0.04)
IMM GRANULOCYTES NFR BLD AUTO: 0 % (ref 0–0.5)
LDLC SERPL CALC-MCNC: 92 MG/DL (ref 63–159)
LYMPHOCYTES # BLD AUTO: 1.2 K/UL (ref 1–4.8)
LYMPHOCYTES NFR BLD: 21.4 % (ref 18–48)
MCH RBC QN AUTO: 28.9 PG (ref 27–31)
MCHC RBC AUTO-ENTMCNC: 33.3 G/DL (ref 32–36)
MCV RBC AUTO: 87 FL (ref 82–98)
MONOCYTES # BLD AUTO: 0.4 K/UL (ref 0.3–1)
MONOCYTES NFR BLD: 8.1 % (ref 4–15)
NEUTROPHILS # BLD AUTO: 3.6 K/UL (ref 1.8–7.7)
NEUTROPHILS NFR BLD: 65.7 % (ref 38–73)
NONHDLC SERPL-MCNC: 113 MG/DL
NRBC BLD-RTO: 0 /100 WBC
PLATELET # BLD AUTO: 204 K/UL (ref 150–450)
PMV BLD AUTO: 11.7 FL (ref 9.2–12.9)
POTASSIUM SERPL-SCNC: 4.3 MMOL/L (ref 3.5–5.1)
PROT SERPL-MCNC: 7.6 G/DL (ref 6–8.4)
RBC # BLD AUTO: 4.71 M/UL (ref 4–5.4)
SODIUM SERPL-SCNC: 141 MMOL/L (ref 136–145)
TRIGL SERPL-MCNC: 105 MG/DL (ref 30–150)
TSH SERPL DL<=0.005 MIU/L-ACNC: 0.65 UIU/ML (ref 0.4–4)
WBC # BLD AUTO: 5.41 K/UL (ref 3.9–12.7)

## 2023-07-06 PROCEDURE — 80053 COMPREHEN METABOLIC PANEL: CPT | Performed by: FAMILY MEDICINE

## 2023-07-06 PROCEDURE — 80061 LIPID PANEL: CPT | Mod: 91 | Performed by: FAMILY MEDICINE

## 2023-07-06 PROCEDURE — 83036 HEMOGLOBIN GLYCOSYLATED A1C: CPT | Performed by: FAMILY MEDICINE

## 2023-07-06 PROCEDURE — 85025 COMPLETE CBC W/AUTO DIFF WBC: CPT | Performed by: FAMILY MEDICINE

## 2023-07-06 PROCEDURE — 36415 COLL VENOUS BLD VENIPUNCTURE: CPT | Performed by: FAMILY MEDICINE

## 2023-07-06 PROCEDURE — 84443 ASSAY THYROID STIM HORMONE: CPT | Performed by: FAMILY MEDICINE

## 2023-07-10 ENCOUNTER — PATIENT MESSAGE (OUTPATIENT)
Dept: CARDIOLOGY | Facility: CLINIC | Age: 38
End: 2023-07-10
Payer: COMMERCIAL

## 2023-07-24 ENCOUNTER — MYC MEDICAL ADVICE (OUTPATIENT)
Dept: FAMILY MEDICINE | Facility: CLINIC | Age: 38
End: 2023-07-24
Payer: COMMERCIAL

## 2023-07-24 DIAGNOSIS — E66.811 CLASS 1 OBESITY WITHOUT SERIOUS COMORBIDITY WITH BODY MASS INDEX (BMI) OF 33.0 TO 33.9 IN ADULT, UNSPECIFIED OBESITY TYPE: Primary | ICD-10-CM

## 2023-07-25 RX ORDER — PHENTERMINE HYDROCHLORIDE 37.5 MG/1
37.5 CAPSULE ORAL EVERY MORNING
Qty: 30 CAPSULE | Refills: 2 | Status: SHIPPED | OUTPATIENT
Start: 2023-07-25 | End: 2024-01-14

## 2023-08-16 ENCOUNTER — PATIENT MESSAGE (OUTPATIENT)
Dept: FAMILY MEDICINE | Facility: CLINIC | Age: 38
End: 2023-08-16
Payer: COMMERCIAL

## 2023-08-25 ENCOUNTER — HOSPITAL ENCOUNTER (OUTPATIENT)
Dept: RADIOLOGY | Facility: HOSPITAL | Age: 38
Discharge: HOME | End: 2023-08-25
Attending: INTERNAL MEDICINE
Payer: COMMERCIAL

## 2023-08-25 DIAGNOSIS — E66.01 MORBID OBESITY WITH BMI OF 40.0-44.9, ADULT: ICD-10-CM

## 2023-08-25 DIAGNOSIS — K76.0 NAFLD (NONALCOHOLIC FATTY LIVER DISEASE): ICD-10-CM

## 2023-08-25 DIAGNOSIS — I10 PRIMARY HYPERTENSION: ICD-10-CM

## 2023-08-25 DIAGNOSIS — Z13.6 ENCOUNTER FOR SCREENING FOR CARDIOVASCULAR DISORDERS: ICD-10-CM

## 2023-08-25 DIAGNOSIS — Z00.00 ROUTINE GENERAL MEDICAL EXAMINATION AT HEALTH CARE FACILITY: ICD-10-CM

## 2023-08-25 PROCEDURE — 75571 CT CALCIUM SCORING CARDIAC: ICD-10-PCS | Mod: 26,,, | Performed by: RADIOLOGY

## 2023-08-25 PROCEDURE — 75571 CT HRT W/O DYE W/CA TEST: CPT | Mod: TC

## 2023-08-25 PROCEDURE — 75571 CT HRT W/O DYE W/CA TEST: CPT | Mod: 26,,, | Performed by: RADIOLOGY

## 2023-08-25 RX ORDER — AMLODIPINE BESYLATE 5 MG/1
5 TABLET ORAL
Qty: 90 TABLET | Refills: 1 | Status: SHIPPED | OUTPATIENT
Start: 2023-08-25 | End: 2024-02-26 | Stop reason: SDUPTHER

## 2023-08-25 NOTE — TELEPHONE ENCOUNTER
Refill Routing Note   Medication(s) are not appropriate for processing by [facility] for the following reason(s):      New or recently adjusted medication    [facility] action(s):  Defer Care Due:  None identified            Appointments  past 12m or future 3m with PCP    Date Provider   Last Visit   7/5/2023 Heriberto Garcia MD   Next Visit   Visit date not found Heriberto Garcia MD   ED visits in past 90 days: 0        Note composed:9:43 AM 08/25/2023

## 2023-08-25 NOTE — PROGRESS NOTES
AS Dr. Reddy requested me to do is to inform Mrs. Allen Villa about her test results. I called Mrs. Villa and informed her that her calcium score is 0.risk of heart disease is very low.  She verbalized and understood. 
Patent

## 2023-08-25 NOTE — TELEPHONE ENCOUNTER
No care due was identified.  [facility] Embedded Care Due Messages. Reference number: [id number].   8/25/2023 9:23:11 AM CDT

## 2023-09-04 ENCOUNTER — OFFICE VISIT (OUTPATIENT)
Dept: URGENT CARE | Facility: CLINIC | Age: 38
End: 2023-09-04
Payer: COMMERCIAL

## 2023-09-04 VITALS
HEART RATE: 75 BPM | OXYGEN SATURATION: 98 % | DIASTOLIC BLOOD PRESSURE: 79 MMHG | SYSTOLIC BLOOD PRESSURE: 115 MMHG | BODY MASS INDEX: 36.02 KG/M2 | WEIGHT: 211 LBS | RESPIRATION RATE: 18 BRPM | HEIGHT: 64 IN | TEMPERATURE: 98 F

## 2023-09-04 DIAGNOSIS — H10.012 ACUTE FOLLICULAR CONJUNCTIVITIS OF LEFT EYE: Primary | ICD-10-CM

## 2023-09-04 PROCEDURE — 99213 OFFICE O/P EST LOW 20 MIN: CPT | Mod: S$GLB,,, | Performed by: FAMILY MEDICINE

## 2023-09-04 PROCEDURE — 99213 PR OFFICE/OUTPT VISIT, EST, LEVL III, 20-29 MIN: ICD-10-PCS | Mod: S$GLB,,, | Performed by: FAMILY MEDICINE

## 2023-09-04 RX ORDER — GENTAMICIN SULFATE 3 MG/ML
1 SOLUTION/ DROPS OPHTHALMIC EVERY 4 HOURS
Qty: 5 ML | Refills: 0 | Status: SHIPPED | OUTPATIENT
Start: 2023-09-04 | End: 2023-09-09

## 2023-09-04 RX ORDER — CYCLOBENZAPRINE HCL 5 MG
5 TABLET ORAL NIGHTLY PRN
COMMUNITY
Start: 2023-08-31

## 2023-09-04 NOTE — PROGRESS NOTES
"Subjective:      Patient ID: Anette Villa is a 38 y.o. female.    Vitals:  height is 5' 4" (1.626 m) and weight is 95.7 kg (211 lb). Her oral temperature is 97.9 °F (36.6 °C). Her blood pressure is 115/79 and her pulse is 75. Her respiration is 18 and oxygen saturation is 98%.     Chief Complaint: Conjunctivitis    Patient reports to having pink eye in her left eye that onset yesterday . Patient stated that it got worse overnight . Patient reports to having discharge from that eye.   Denies any insect bite      Conjunctivitis  This is a new problem. The current episode started yesterday. The problem occurs constantly. The problem has been unchanged. Pertinent negatives include no abdominal pain, anorexia, arthralgias, change in bowel habit, chest pain, chills, congestion, coughing, diaphoresis, fatigue, fever, headaches, joint swelling, myalgias, nausea, neck pain, numbness, rash, sore throat, swollen glands, urinary symptoms, vertigo, visual change, vomiting or weakness. Nothing aggravates the symptoms. She has tried nothing for the symptoms. The treatment provided no relief.       Constitution: Negative for chills, sweating, fatigue and fever.   HENT:  Negative for congestion and sore throat.    Neck: Negative for neck pain.   Cardiovascular:  Negative for chest pain.   Respiratory:  Negative for cough.    Gastrointestinal:  Negative for abdominal pain, nausea and vomiting.   Musculoskeletal:  Negative for joint pain, joint swelling and muscle ache.   Skin:  Negative for rash.   Neurological:  Negative for history of vertigo, headaches and numbness.      Objective:     Physical Exam   Constitutional: She is oriented to person, place, and time. She appears well-developed.   HENT:   Head: Normocephalic and atraumatic.   Ears:   Right Ear: External ear normal.   Left Ear: External ear normal.   Nose: Nose normal.   Mouth/Throat: Oropharynx is clear and moist. Mucous membranes are moist. Oropharynx is clear. "   Eyes: EOM and lids are normal. Pupils are equal, round, and reactive to light. Left eye exhibits discharge.      Comments: Left upper eye lid with mild swelling and erythema  Conjunctivae with erythema and crusty draiange  No FB     Neck: Trachea normal and phonation normal. Neck supple.   Musculoskeletal: Normal range of motion.         General: Normal range of motion.   Neurological: She is alert and oriented to person, place, and time.   Skin: Skin is warm, dry and intact.   Psychiatric: Her speech is normal and behavior is normal. Judgment and thought content normal.   Nursing note and vitals reviewed.      Assessment:     1. Acute follicular conjunctivitis of left eye        Plan:       Acute follicular conjunctivitis of left eye    Other orders  -     gentamicin (GARAMYCIN) 0.3 % ophthalmic solution; Place 1 drop into the left eye every 4 (four) hours. for 5 days  Dispense: 5 mL; Refill: 0         Apply warm compresses

## 2023-09-05 ENCOUNTER — OFFICE VISIT (OUTPATIENT)
Dept: OPTOMETRY | Facility: CLINIC | Age: 38
End: 2023-09-05
Payer: COMMERCIAL

## 2023-09-05 DIAGNOSIS — B30.9 VIRAL CONJUNCTIVITIS OF LEFT EYE: Primary | ICD-10-CM

## 2023-09-05 PROCEDURE — 99202 OFFICE O/P NEW SF 15 MIN: CPT | Mod: S$GLB,,, | Performed by: OPTOMETRIST

## 2023-09-05 PROCEDURE — 1159F PR MEDICATION LIST DOCUMENTED IN MEDICAL RECORD: ICD-10-PCS | Mod: CPTII,S$GLB,, | Performed by: OPTOMETRIST

## 2023-09-05 PROCEDURE — 99202 PR OFFICE/OUTPT VISIT, NEW, LEVL II, 15-29 MIN: ICD-10-PCS | Mod: S$GLB,,, | Performed by: OPTOMETRIST

## 2023-09-05 PROCEDURE — 99999 PR PBB SHADOW E&M-EST. PATIENT-LVL II: CPT | Mod: PBBFAC,,, | Performed by: OPTOMETRIST

## 2023-09-05 PROCEDURE — 3044F PR MOST RECENT HEMOGLOBIN A1C LEVEL <7.0%: ICD-10-PCS | Mod: CPTII,S$GLB,, | Performed by: OPTOMETRIST

## 2023-09-05 PROCEDURE — 3044F HG A1C LEVEL LT 7.0%: CPT | Mod: CPTII,S$GLB,, | Performed by: OPTOMETRIST

## 2023-09-05 PROCEDURE — 99999 PR PBB SHADOW E&M-EST. PATIENT-LVL II: ICD-10-PCS | Mod: PBBFAC,,, | Performed by: OPTOMETRIST

## 2023-09-05 PROCEDURE — 1159F MED LIST DOCD IN RCRD: CPT | Mod: CPTII,S$GLB,, | Performed by: OPTOMETRIST

## 2023-09-05 RX ORDER — AZITHROMYCIN 250 MG/1
TABLET, FILM COATED ORAL
Qty: 6 TABLET | Refills: 0 | Status: SHIPPED | OUTPATIENT
Start: 2023-09-05 | End: 2023-09-10

## 2023-09-05 RX ORDER — PREDNISOLONE ACETATE 10 MG/ML
1 SUSPENSION/ DROPS OPHTHALMIC 4 TIMES DAILY
Qty: 5 ML | Refills: 0 | Status: SHIPPED | OUTPATIENT
Start: 2023-09-05 | End: 2024-09-04

## 2023-09-05 NOTE — PROGRESS NOTES
HPI     Eye Pain            Laterality: left eye    Onset: sudden    Quality: blurred    Severity: severe    Onset: 2 days ago    Course: gradually worsening    Associated symptoms: eye pain and tearing    Treatments tried: eye drops (Gentamycin TID for 1 day (prescribed by   urgent care). Pt reports good compliance and uses hot compresses since   Sunday. Eye flushes since Sunday.)    Response to treatment: no improvement    Comments: Pain severity 7/10.          Comments    Watery discharge. Pt reported sinus infection day before urgent care   visit.           Last edited by Jante Rdz, OD on 9/5/2023  9:53 AM.        ROS    Positive for: Eyes  Last edited by Janet Rdz, OD on 9/5/2023  8:56 AM.        Assessment /Plan     For exam results, see Encounter Report.    Viral conjunctivitis of left eye  -     prednisoLONE acetate (PRED FORTE) 1 % DrpS; Place 1 drop into the left eye 4 (four) times daily.  Dispense: 5 mL; Refill: 0  -     azithromycin (Z-JEFRY) 250 MG tablet; Take 2 tablets by mouth on day 1; Take 1 tablet by mouth on days 2-5  Dispense: 6 tablet; Refill: 0    Pt edu on exam findings.  Dispensed Rx for oral azithromycin 2 tablets PO day 1, 1 tablet PO day 2.  Dispensed Rx for pred forte Q2h for 2 days, QID 1 week, taper TID x 1 week, BID x 1 week, QD 1 week.  RTC in 1 week for viral F/U and IOP check.

## 2023-09-09 ENCOUNTER — HOSPITAL ENCOUNTER (EMERGENCY)
Facility: HOSPITAL | Age: 38
Discharge: HOME | End: 2023-09-09
Attending: EMERGENCY MEDICINE
Payer: COMMERCIAL

## 2023-09-09 VITALS
SYSTOLIC BLOOD PRESSURE: 173 MMHG | RESPIRATION RATE: 16 BRPM | BODY MASS INDEX: 35.16 KG/M2 | WEIGHT: 211 LBS | HEART RATE: 82 BPM | OXYGEN SATURATION: 100 % | HEIGHT: 65 IN | DIASTOLIC BLOOD PRESSURE: 108 MMHG

## 2023-09-09 DIAGNOSIS — S01.511A LIP LACERATION, INITIAL ENCOUNTER: Primary | ICD-10-CM

## 2023-09-09 LAB
B-HCG UR QL: NEGATIVE
CTP QC/QA: YES

## 2023-09-09 PROCEDURE — 99284 EMERGENCY DEPT VISIT MOD MDM: CPT | Mod: 25

## 2023-09-09 PROCEDURE — 12011 RPR F/E/E/N/L/M 2.5 CM/<: CPT

## 2023-09-09 PROCEDURE — 25000003 PHARM REV CODE 250: Performed by: EMERGENCY MEDICINE

## 2023-09-09 PROCEDURE — 96372 THER/PROPH/DIAG INJ SC/IM: CPT | Performed by: EMERGENCY MEDICINE

## 2023-09-09 PROCEDURE — 81025 URINE PREGNANCY TEST: CPT | Performed by: EMERGENCY MEDICINE

## 2023-09-09 PROCEDURE — 63600175 PHARM REV CODE 636 W HCPCS: Performed by: EMERGENCY MEDICINE

## 2023-09-09 RX ORDER — KETOROLAC TROMETHAMINE 30 MG/ML
30 INJECTION, SOLUTION INTRAMUSCULAR; INTRAVENOUS
Status: COMPLETED | OUTPATIENT
Start: 2023-09-09 | End: 2023-09-09

## 2023-09-09 RX ORDER — CHLORHEXIDINE GLUCONATE ORAL RINSE 1.2 MG/ML
15 SOLUTION DENTAL 2 TIMES DAILY
Qty: 473 ML | Refills: 0 | Status: SHIPPED | OUTPATIENT
Start: 2023-09-09 | End: 2023-09-23

## 2023-09-09 RX ORDER — LIDOCAINE HYDROCHLORIDE 10 MG/ML
10 INJECTION, SOLUTION EPIDURAL; INFILTRATION; INTRACAUDAL; PERINEURAL
Status: COMPLETED | OUTPATIENT
Start: 2023-09-09 | End: 2023-09-09

## 2023-09-09 RX ORDER — KETOROLAC TROMETHAMINE 10 MG/1
10 TABLET, FILM COATED ORAL EVERY 6 HOURS
Qty: 20 TABLET | Refills: 0 | Status: SHIPPED | OUTPATIENT
Start: 2023-09-09 | End: 2023-09-14

## 2023-09-09 RX ADMIN — KETOROLAC TROMETHAMINE 30 MG: 30 INJECTION INTRAMUSCULAR; INTRAVENOUS at 05:09

## 2023-09-09 RX ADMIN — LIDOCAINE HYDROCHLORIDE 100 MG: 10 INJECTION, SOLUTION EPIDURAL; INFILTRATION; INTRACAUDAL; PERINEURAL at 05:09
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
## 2023-09-09 NOTE — DISCHARGE INSTRUCTIONS

## 2023-09-13 ENCOUNTER — TELEPHONE (OUTPATIENT)
Dept: ENDOSCOPY | Facility: HOSPITAL | Age: 38
End: 2023-09-13
Payer: COMMERCIAL

## 2023-09-13 NOTE — TELEPHONE ENCOUNTER
----- Message from Shea Santamaria RN sent at 9/13/2023  3:27 PM CDT -----  Regarding: FW: Pt's mother thought appt was scheduled, please advise  Contact: @[phone]    ----- Message -----  From: Esperanza Jhaveri  Sent: 9/13/2023   1:39 PM CDT  To: Heena Nguyễn Staff  Subject: Pt's mother thought appt was scheduled, plea#    Regarding: Confusion about Appt for today 9/13/2023 please advise mother Nabila/Hannah calling.    Contact: Hannah    Type: Call back for advise    Who Called: Hannah    Would the patient rather a call back or a response via [facility]?     Best Call Back Number: @[phone]    Additional Information:     Nature of the appt, rapid stomach emptying.

## 2023-09-20 ENCOUNTER — OFFICE VISIT (OUTPATIENT)
Dept: GASTROENTEROLOGY | Facility: CLINIC | Age: 38
End: 2023-09-20
Payer: COMMERCIAL

## 2023-09-20 ENCOUNTER — PATIENT MESSAGE (OUTPATIENT)
Dept: GASTROENTEROLOGY | Facility: CLINIC | Age: 38
End: 2023-09-20

## 2023-09-20 DIAGNOSIS — R19.7 DIARRHEA IN ADULT PATIENT: ICD-10-CM

## 2023-09-20 DIAGNOSIS — K58.2 IRRITABLE BOWEL SYNDROME WITH BOTH CONSTIPATION AND DIARRHEA: Primary | ICD-10-CM

## 2023-09-20 DIAGNOSIS — E66.01 SEVERE OBESITY (BMI 35.0-39.9) WITH COMORBIDITY: ICD-10-CM

## 2023-09-20 DIAGNOSIS — Z83.719 FAMILY HISTORY OF COLONIC POLYPS: ICD-10-CM

## 2023-09-20 PROCEDURE — 3044F HG A1C LEVEL LT 7.0%: CPT | Mod: CPTII,95,, | Performed by: INTERNAL MEDICINE

## 2023-09-20 PROCEDURE — 3044F PR MOST RECENT HEMOGLOBIN A1C LEVEL <7.0%: ICD-10-PCS | Mod: CPTII,95,, | Performed by: INTERNAL MEDICINE

## 2023-09-20 PROCEDURE — 99204 PR OFFICE/OUTPT VISIT, NEW, LEVL IV, 45-59 MIN: ICD-10-PCS | Mod: 95,,, | Performed by: INTERNAL MEDICINE

## 2023-09-20 PROCEDURE — 99204 OFFICE O/P NEW MOD 45 MIN: CPT | Mod: 95,,, | Performed by: INTERNAL MEDICINE

## 2023-09-20 NOTE — Clinical Note
"Procedure: EGD/Colonoscopy  Diagnosis: Diarrhea change in bowel habits  Procedure Timin-12 weeks  *If within 4 weeks selected, please elaina as high priority*  *If greater than 12 weeks, please select "5-12 weeks" and delay sending until 2 months prior to requested date*  Provider: Myself  Location: [address]  Additional Scheduling Information: On Ozempic  Prep Specifications:  Standard bowel prep  Is the patient taking a GLP-1 Agonist:yes  Have you attached a patient to this message: yes "

## 2023-09-20 NOTE — PATIENT INSTRUCTIONS
"Procedure: EGD/Colonoscopy    Diagnosis: Diarrhea change in bowel habits    Procedure Timin-12 weeks    *If within 4 weeks selected, please elaina as high priority*    *If greater than 12 weeks, please select "5-12 weeks" and delay sending until 2 months prior to requested date*    Provider: Myself    Location: [address]    Additional Scheduling Information: On Ozempic    Prep Specifications:  Standard bowel prep    Is the patient taking a GLP-1 Agonist:yes    Have you attached a patient to this message: yes     "
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
## 2023-09-20 NOTE — Clinical Note
Please schedule Catalina for 12 hour fasting blood work this week at [facility] location also she would also like to turn her stool samples in over there  Referral placed for endoscopy schedulers for EGD colonoscopy for change in bowel habits  Return GI clinic 3 months for follow-up okay for telemedicine video visit

## 2023-09-28 ENCOUNTER — TELEPHONE (OUTPATIENT)
Dept: ENDOSCOPY | Facility: HOSPITAL | Age: 38
End: 2023-09-28
Payer: COMMERCIAL

## 2023-09-28 NOTE — TELEPHONE ENCOUNTER
"Contacted the patient to schedule an endoscopy procedure(s) EGD and Colonoscopy. The patient did not answer the call and left a voice message requesting a call back.       ----- Message -----   From: William Gusman MD   Sent: 2023   1:39 PM CDT   To: [facility]     Procedure: EGD/Colonoscopy     Diagnosis: Diarrhea change in bowel habits     Procedure Timin-12 weeks     *If within 4 weeks selected, please elaina as high priority*     *If greater than 12 weeks, please select "5-12 weeks" and delay sending until 2 months prior to requested date*     Provider: Myself     Location: [address]     Additional Scheduling Information: On Ozempic     Prep Specifications:  Standard bowel prep     Is the patient taking a GLP-1 Agonist:yes     Have you attached a patient to this message: yes    "

## 2023-10-02 ENCOUNTER — TELEPHONE (OUTPATIENT)
Dept: ENDOSCOPY | Facility: HOSPITAL | Age: 38
End: 2023-10-02
Payer: COMMERCIAL

## 2023-10-04 ENCOUNTER — PATIENT MESSAGE (OUTPATIENT)
Dept: ENDOSCOPY | Facility: HOSPITAL | Age: 38
End: 2023-10-04
Payer: COMMERCIAL

## 2023-10-04 ENCOUNTER — TELEPHONE (OUTPATIENT)
Dept: ENDOSCOPY | Facility: HOSPITAL | Age: 38
End: 2023-10-04
Payer: COMMERCIAL

## 2023-10-04 NOTE — TELEPHONE ENCOUNTER
"Contacted the patient to schedule an endoscopy procedure(s) EGD and Colonoscopy. The patient did not answer the call and left a voice message requesting a call back.       ----- Message -----   From: William Gusman MD   Sent: 2023   1:39 PM CDT   To: [facility]     Procedure: EGD/Colonoscopy     Diagnosis: Diarrhea change in bowel habits     Procedure Timin-12 weeks     *If within 4 weeks selected, please elaina as high priority*     *If greater than 12 weeks, please select "5-12 weeks" and delay sending until 2 months prior to requested date*     Provider: Myself     Location: [address]     Additional Scheduling Information: On Ozempic     Prep Specifications:  Standard bowel prep     Is the patient taking a GLP-1 Agonist:yes     Have you attached a patient to this message: yes   "

## 2023-10-17 ENCOUNTER — TELEPHONE (OUTPATIENT)
Dept: ENDOSCOPY | Facility: HOSPITAL | Age: 38
End: 2023-10-17
Payer: COMMERCIAL

## 2023-12-26 ENCOUNTER — TELEPHONE (OUTPATIENT)
Dept: FAMILY MEDICINE | Facility: CLINIC | Age: 38
End: 2023-12-26
Payer: COMMERCIAL

## 2023-12-26 NOTE — TELEPHONE ENCOUNTER
----- Message from Iveth Mendes sent at 12/26/2023 10:48 AM CST -----  Type:  Needs Medical Advice    Who Called:  Pt Mother   Symptoms (please be specific): Cough, body aches, earaches     Would the patient rather a call back or a response via [facility]?  Call back   Best Call Back Number: [phone] (mom)  Additional Information:  Pt is requesting a call back in reference to being seen today.

## 2024-01-03 ENCOUNTER — TELEPHONE (OUTPATIENT)
Dept: FAMILY MEDICINE | Facility: CLINIC | Age: 39
End: 2024-01-03
Payer: COMMERCIAL

## 2024-01-03 NOTE — TELEPHONE ENCOUNTER
Spoke to pt's mother, Hannah and stated that pt has a cough/congestion. Pt was scheduled for an appt with Dr. Garcia.

## 2024-01-03 NOTE — TELEPHONE ENCOUNTER
----- Message from Radha Benjamin sent at 1/3/2024  9:23 AM CST -----  Needs advice from nurse:      Who Called:mother-Hannah  Regarding:pt needs to be seen today for cough/congestion  Would the patient rather a call back or [facility]?  Best Call Back number: [phone]  Additional Info:

## 2024-01-05 ENCOUNTER — TELEPHONE (OUTPATIENT)
Dept: ENDOSCOPY | Facility: HOSPITAL | Age: 39
End: 2024-01-05
Payer: COMMERCIAL

## 2024-01-05 VITALS — WEIGHT: 211 LBS | BODY MASS INDEX: 35.16 KG/M2 | HEIGHT: 65 IN

## 2024-01-05 DIAGNOSIS — R19.7 DIARRHEA, UNSPECIFIED TYPE: Primary | ICD-10-CM

## 2024-01-05 DIAGNOSIS — R19.4 CHANGE IN BOWEL HABITS: ICD-10-CM

## 2024-01-05 RX ORDER — SODIUM, POTASSIUM,MAG SULFATES 17.5-3.13G
1 SOLUTION, RECONSTITUTED, ORAL ORAL DAILY
Qty: 1 KIT | Refills: 0 | Status: SHIPPED | OUTPATIENT
Start: 2024-01-05 | End: 2024-01-07

## 2024-01-05 NOTE — TELEPHONE ENCOUNTER
"Pt's mother Hannah called initially. Apologized and informed that I would need to speak with pt or her spouse, Elaina due to HIPAA and who is listed in chart as approved to discuss pt's medical info. Elaina was placed on phone. He updated his cell # as it was listed as [phone] instead of [phone].  Pt was called for a moment during call by  to verify some of pt's medical history.    Orders in chart as follows:  "----- Message -----   From: William Gusman MD   Sent: 2023   1:39 PM CDT   To: [facility]     Procedure: EGD/Colonoscopy     Diagnosis: Diarrhea change in bowel habits     Procedure Timin-12 weeks     *If within 4 weeks selected, please elaina as high priority*     *If greater than 12 weeks, please select "5-12 weeks" and delay sending until 2 months prior to requested date*     Provider: Myself     Location: [address]     Additional Scheduling Information: On Ozempic     Prep Specifications:  Standard bowel prep     Is the patient taking a GLP-1 Agonist:yes     Have you attached a patient to this message: yes "    "

## 2024-01-05 NOTE — TELEPHONE ENCOUNTER
Spoke to pt's , Nando to schedule procedure(s) Colonoscopy/EGD       Physician to perform procedure(s) Dr. JOHN Gusman  Date of Procedure (s) 4/23/24  Arrival Time 12:45 PM  Time of Procedure(s) 1:45 PM   Location of Procedure(s) [address]  Type of Rx Prep sent to patient: Suprep  Instructions provided to patient via [facility]    Ozempic, pt's  inst to hold per protocol    Patient was informed on the following information and verbalized understanding. Screening questionnaire reviewed with patient and complete. If procedure requires anesthesia, a responsible adult needs to be present to accompany the patient home, patient cannot drive after receiving anesthesia. Appointment details are tentative, especially check-in time. Patient will receive a prep-op call 7 days prior to confirm check-in time for procedure. If applicable the patient should contact their pharmacy to verify Rx for procedure prep is ready for pick-up. Patient was advised to call the scheduling department at [phone] if pharmacy states no Rx is available. Patient was advised to call the endoscopy scheduling department if any questions or concerns arise.       Endoscopy Scheduling Department

## 2024-01-05 NOTE — PLAN OF CARE
Spoke to pt's , Nando to schedule procedure(s) Colonoscopy/EGD       Physician to perform procedure(s) Dr. JOHN Gusman  Date of Procedure (s) 4/23/24  Arrival Time 12:45 PM  Time of Procedure(s) 1:45 PM   Location of Procedure(s) [address]  Type of Rx Prep sent to patient: Suprep  Instructions provided to patient via [facility]    Ozempic, pt's  inst to hold per protocol    Patient was informed on the following information and verbalized understanding. Screening questionnaire reviewed with patient and complete. If procedure requires anesthesia, a responsible adult needs to be present to accompany the patient home, patient cannot drive after receiving anesthesia. Appointment details are tentative, especially check-in time. Patient will receive a prep-op call 7 days prior to confirm check-in time for procedure. If applicable the patient should contact their pharmacy to verify Rx for procedure prep is ready for pick-up. Patient was advised to call the scheduling department at [phone] if pharmacy states no Rx is available. Patient was advised to call the endoscopy scheduling department if any questions or concerns arise.       Endoscopy Scheduling Department

## 2024-01-14 ENCOUNTER — MYC REFILL (OUTPATIENT)
Dept: FAMILY MEDICINE | Facility: CLINIC | Age: 39
End: 2024-01-14
Payer: COMMERCIAL

## 2024-01-14 DIAGNOSIS — E66.811 CLASS 1 OBESITY WITHOUT SERIOUS COMORBIDITY WITH BODY MASS INDEX (BMI) OF 33.0 TO 33.9 IN ADULT, UNSPECIFIED OBESITY TYPE: ICD-10-CM

## 2024-01-16 RX ORDER — PHENTERMINE HYDROCHLORIDE 37.5 MG/1
37.5 CAPSULE ORAL EVERY MORNING
Qty: 30 CAPSULE | Refills: 5 | Status: SHIPPED | OUTPATIENT
Start: 2024-01-16 | End: 2024-09-12

## 2024-02-01 ENCOUNTER — TELEPHONE (OUTPATIENT)
Dept: OTOLARYNGOLOGY | Facility: CLINIC | Age: 39
End: 2024-02-01
Payer: COMMERCIAL

## 2024-02-01 NOTE — TELEPHONE ENCOUNTER
----- Message from Allyosn Lunsford sent at 2/1/2024 10:33 AM CST -----  Type:  Same Day Appointment Request    Caller is requesting a same day appointment.  Caller declined first available appointment listed below.    Name of Caller: pt's mother  When is the first available appointment? 2/27/24  Symptoms: headaches; sinus problems; fluid in ears causing pressure  Best Call Back Number: [phone]  Additional Information:

## 2024-02-07 ENCOUNTER — OFFICE VISIT (OUTPATIENT)
Dept: OTOLARYNGOLOGY | Facility: CLINIC | Age: 39
End: 2024-02-07
Payer: COMMERCIAL

## 2024-02-07 ENCOUNTER — CLINICAL SUPPORT (OUTPATIENT)
Dept: OTOLARYNGOLOGY | Facility: CLINIC | Age: 39
End: 2024-02-07
Payer: COMMERCIAL

## 2024-02-07 VITALS
BODY MASS INDEX: 35.75 KG/M2 | DIASTOLIC BLOOD PRESSURE: 90 MMHG | SYSTOLIC BLOOD PRESSURE: 129 MMHG | HEART RATE: 85 BPM | WEIGHT: 214.81 LBS

## 2024-02-07 DIAGNOSIS — H93.19 TINNITUS, UNSPECIFIED LATERALITY: ICD-10-CM

## 2024-02-07 DIAGNOSIS — J31.0 CHRONIC RHINITIS: ICD-10-CM

## 2024-02-07 DIAGNOSIS — H93.8X3 EAR FULLNESS, BILATERAL: Primary | ICD-10-CM

## 2024-02-07 DIAGNOSIS — H93.8X3 SENSATION OF FULLNESS IN BOTH EARS: Primary | ICD-10-CM

## 2024-02-07 DIAGNOSIS — M95.0 NASAL VALVE COLLAPSE: ICD-10-CM

## 2024-02-07 DIAGNOSIS — J32.9 CHRONIC SINUSITIS, UNSPECIFIED LOCATION: ICD-10-CM

## 2024-02-07 PROCEDURE — 99204 OFFICE O/P NEW MOD 45 MIN: CPT | Mod: S$GLB,,, | Performed by: NURSE PRACTITIONER

## 2024-02-07 PROCEDURE — 99999 PR PBB SHADOW E&M-EST. PATIENT-LVL III: CPT | Mod: PBBFAC,,, | Performed by: NURSE PRACTITIONER

## 2024-02-07 PROCEDURE — 1159F MED LIST DOCD IN RCRD: CPT | Mod: CPTII,S$GLB,, | Performed by: NURSE PRACTITIONER

## 2024-02-07 PROCEDURE — 92567 TYMPANOMETRY: CPT | Mod: S$GLB,,, | Performed by: PHYSICIAN ASSISTANT

## 2024-02-07 PROCEDURE — 3008F BODY MASS INDEX DOCD: CPT | Mod: CPTII,S$GLB,, | Performed by: NURSE PRACTITIONER

## 2024-02-07 PROCEDURE — 92552 PURE TONE AUDIOMETRY AIR: CPT | Mod: S$GLB,,, | Performed by: PHYSICIAN ASSISTANT

## 2024-02-07 PROCEDURE — 99999 PR PBB SHADOW E&M-EST. PATIENT-LVL I: CPT | Mod: PBBFAC,,, | Performed by: PHYSICIAN ASSISTANT

## 2024-02-07 PROCEDURE — 3080F DIAST BP >= 90 MM HG: CPT | Mod: CPTII,S$GLB,, | Performed by: NURSE PRACTITIONER

## 2024-02-07 PROCEDURE — 3074F SYST BP LT 130 MM HG: CPT | Mod: CPTII,S$GLB,, | Performed by: NURSE PRACTITIONER

## 2024-02-07 PROCEDURE — 1160F RVW MEDS BY RX/DR IN RCRD: CPT | Mod: CPTII,S$GLB,, | Performed by: NURSE PRACTITIONER

## 2024-02-07 PROCEDURE — 92556 SPEECH AUDIOMETRY COMPLETE: CPT | Mod: S$GLB,,, | Performed by: PHYSICIAN ASSISTANT

## 2024-02-07 NOTE — PROGRESS NOTES
Anette Villa, a 38 y.o. female, was seen today in the clinic for an audiologic evaluation.  Patients main complaints were bilateral ear fullness and tinnitus.  She reports suffering with sinus infections her entire life.  She denies ear pain or drainage.      Audiogram results revealed normal hearing sensitivity bilaterally.  Speech reception thresholds were noted at 5 dB in the right ear and 5 dB in the left ear.  Speech discrimination scores were 100% in the right ear and 100% in the left ear.  Tympanometry revealed Type A in the right ear and Type A in the left ear.     Recommendations:  Otologic evaluation  Annual audiogram  Hearing protection when in noise

## 2024-02-07 NOTE — PROCEDURES
Nasal/sinus endoscopy    Date/Time: 2/7/2024 1:00 PM    Performed by: Stephanie Acuna NP  Authorized by: Stephanie Acuna NP    Consent Done?:  Yes (Verbal)  Anesthesia:     Local anesthetic:  Afrin and lidocaine spray    Location: bilateral nostrils.    Type of Endoscope:  Flexible  External:      No external nasal deformity  Intranasal:      Mucosa no polyps     Mucosa ulcers not present     No mucosa lesions present     Enlarged turbinates (clear nasal discharge)     No septum gross deformity  Nasopharynx:      No mucosa lesions     Adenoids not present     Posterior choanae patent     Eustachian tube patent
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
## 2024-02-12 DIAGNOSIS — F41.9 ANXIETY: ICD-10-CM

## 2024-02-12 RX ORDER — ESCITALOPRAM OXALATE 10 MG/1
10 TABLET ORAL DAILY
Qty: 90 TABLET | Refills: 0 | Status: SHIPPED | OUTPATIENT
Start: 2024-02-12 | End: 2024-09-13

## 2024-02-14 ENCOUNTER — HOSPITAL ENCOUNTER (OUTPATIENT)
Dept: RADIOLOGY | Facility: HOSPITAL | Age: 39
Discharge: HOME | End: 2024-02-14
Attending: NURSE PRACTITIONER
Payer: COMMERCIAL

## 2024-02-14 DIAGNOSIS — J32.9 CHRONIC SINUSITIS, UNSPECIFIED LOCATION: ICD-10-CM

## 2024-02-14 PROCEDURE — 70486 CT MAXILLOFACIAL W/O DYE: CPT | Mod: TC

## 2024-02-14 PROCEDURE — 70486 CT MAXILLOFACIAL W/O DYE: CPT | Mod: 26,,, | Performed by: RADIOLOGY

## 2024-02-15 ENCOUNTER — TELEPHONE (OUTPATIENT)
Dept: OTOLARYNGOLOGY | Facility: CLINIC | Age: 39
End: 2024-02-15
Payer: COMMERCIAL

## 2024-02-15 ENCOUNTER — PATIENT MESSAGE (OUTPATIENT)
Dept: OTOLARYNGOLOGY | Facility: CLINIC | Age: 39
End: 2024-02-15
Payer: COMMERCIAL

## 2024-02-15 ENCOUNTER — DOCUMENTATION ONLY (OUTPATIENT)
Dept: OTOLARYNGOLOGY | Facility: CLINIC | Age: 39
End: 2024-02-15
Payer: COMMERCIAL

## 2024-02-15 DIAGNOSIS — J34.3 NASAL TURBINATE HYPERTROPHY: ICD-10-CM

## 2024-02-15 DIAGNOSIS — J31.0 CHRONIC RHINITIS: Primary | ICD-10-CM

## 2024-02-15 DIAGNOSIS — J34.89 CONCHA BULLOSA: ICD-10-CM

## 2024-02-15 NOTE — TELEPHONE ENCOUNTER
Called and left message for patient giving her date and time of referred appointment with Dr. Pierson. It is 3/28/2024 at (am. Asked patient to call if any questions.

## 2024-02-15 NOTE — TELEPHONE ENCOUNTER
----- Message from Stephanie Acuna NP sent at 2/15/2024  2:34 PM CST -----  I reviewed her CT scan results with her and will refer her to Dr. Pierson. Please make an appt for her with Dr. Pierson and can let her know on MyCheck. Thank you.

## 2024-02-15 NOTE — PROGRESS NOTES
I reviewed her CT scan results with her and will refer her to Dr. Pierson. Please make an appt for her with Dr. Pierson and can let her know on Bringrrt. Thank you.

## 2024-02-15 NOTE — TELEPHONE ENCOUNTER
I spoke with Mrs. Villa over the phone and reviewed CT [employer] sinuses results with her. CT showed no evidence of sinusitis.  Middle turbinate iftikhar bullosa noted bilaterally. I will refer her to Dr. Pierson for further evaluation.

## 2024-02-25 DIAGNOSIS — I10 PRIMARY HYPERTENSION: ICD-10-CM

## 2024-02-25 DIAGNOSIS — K76.0 NAFLD (NONALCOHOLIC FATTY LIVER DISEASE): ICD-10-CM

## 2024-02-25 DIAGNOSIS — E66.01 MORBID OBESITY WITH BMI OF 40.0-44.9, ADULT: ICD-10-CM

## 2024-02-25 DIAGNOSIS — Z00.00 ROUTINE GENERAL MEDICAL EXAMINATION AT HEALTH CARE FACILITY: ICD-10-CM

## 2024-02-25 NOTE — TELEPHONE ENCOUNTER
No care due was identified.  [facility] Embedded Care Due Messages. Reference number: [id number].   2/25/2024 7:04:42 AM CST

## 2024-02-26 RX ORDER — AMLODIPINE BESYLATE 5 MG/1
5 TABLET ORAL
Qty: 90 TABLET | Refills: 1 | Status: SHIPPED | OUTPATIENT
Start: 2024-02-26

## 2024-03-07 ENCOUNTER — OFFICE VISIT (OUTPATIENT)
Dept: OBSTETRICS AND GYNECOLOGY | Facility: CLINIC | Age: 39
End: 2024-03-07
Payer: COMMERCIAL

## 2024-03-07 VITALS
WEIGHT: 217.38 LBS | BODY MASS INDEX: 36.17 KG/M2 | SYSTOLIC BLOOD PRESSURE: 124 MMHG | DIASTOLIC BLOOD PRESSURE: 82 MMHG

## 2024-03-07 DIAGNOSIS — N64.4 BREAST TENDERNESS: ICD-10-CM

## 2024-03-07 DIAGNOSIS — Z01.419 WELL WOMAN EXAM: Primary | ICD-10-CM

## 2024-03-07 PROCEDURE — 99999 PR PBB SHADOW E&M-EST. PATIENT-LVL III: CPT | Mod: PBBFAC,,,

## 2024-03-07 PROCEDURE — 3074F SYST BP LT 130 MM HG: CPT | Mod: CPTII,S$GLB,,

## 2024-03-07 PROCEDURE — 99385 PREV VISIT NEW AGE 18-39: CPT | Mod: S$GLB,,,

## 2024-03-07 PROCEDURE — 88175 CYTOPATH C/V AUTO FLUID REDO: CPT

## 2024-03-07 PROCEDURE — 3079F DIAST BP 80-89 MM HG: CPT | Mod: CPTII,S$GLB,,

## 2024-03-07 PROCEDURE — 87624 HPV HI-RISK TYP POOLED RSLT: CPT

## 2024-03-07 PROCEDURE — 1159F MED LIST DOCD IN RCRD: CPT | Mod: CPTII,S$GLB,,

## 2024-03-07 PROCEDURE — 3008F BODY MASS INDEX DOCD: CPT | Mod: CPTII,S$GLB,,

## 2024-03-07 PROCEDURE — 1160F RVW MEDS BY RX/DR IN RCRD: CPT | Mod: CPTII,S$GLB,,
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
## 2024-03-14 DIAGNOSIS — Z00.00 ROUTINE GENERAL MEDICAL EXAMINATION AT A HEALTH CARE FACILITY: ICD-10-CM

## 2024-03-14 RX ORDER — NORGESTIMATE AND ETHINYL ESTRADIOL 7DAYSX3 28
KIT ORAL
Qty: 84 TABLET | Refills: 0 | Status: SHIPPED | OUTPATIENT
Start: 2024-03-14 | End: 2024-06-18

## 2024-03-15 LAB
HPV HR 12 DNA SPEC QL NAA+PROBE: NEGATIVE
HPV16 AG SPEC QL: NEGATIVE
HPV18 DNA SPEC QL NAA+PROBE: NEGATIVE

## 2024-03-18 LAB
FINAL PATHOLOGIC DIAGNOSIS: NORMAL
Lab: NORMAL

## 2024-03-28 ENCOUNTER — OFFICE VISIT (OUTPATIENT)
Dept: OTOLARYNGOLOGY | Facility: CLINIC | Age: 39
End: 2024-03-28
Payer: COMMERCIAL

## 2024-03-28 VITALS
BODY MASS INDEX: 37.02 KG/M2 | WEIGHT: 222.44 LBS | DIASTOLIC BLOOD PRESSURE: 85 MMHG | HEART RATE: 69 BPM | SYSTOLIC BLOOD PRESSURE: 137 MMHG

## 2024-03-28 DIAGNOSIS — H68.003 SALPINGITIS OF BOTH EUSTACHIAN TUBES: ICD-10-CM

## 2024-03-28 DIAGNOSIS — J31.0 NONALLERGIC RHINITIS: Primary | ICD-10-CM

## 2024-03-28 DIAGNOSIS — J01.91 ACUTE RECURRENT SINUSITIS, UNSPECIFIED LOCATION: ICD-10-CM

## 2024-03-28 DIAGNOSIS — J34.2 DEVIATED NASAL SEPTUM: ICD-10-CM

## 2024-03-28 DIAGNOSIS — J34.3 NASAL TURBINATE HYPERTROPHY: ICD-10-CM

## 2024-03-28 DIAGNOSIS — J34.89 CONCHA BULLOSA: ICD-10-CM

## 2024-03-28 PROCEDURE — 87075 CULTR BACTERIA EXCEPT BLOOD: CPT | Performed by: OTOLARYNGOLOGY

## 2024-03-28 PROCEDURE — 87077 CULTURE AEROBIC IDENTIFY: CPT | Performed by: OTOLARYNGOLOGY

## 2024-03-28 PROCEDURE — 87070 CULTURE OTHR SPECIMN AEROBIC: CPT | Performed by: OTOLARYNGOLOGY

## 2024-03-28 PROCEDURE — 3079F DIAST BP 80-89 MM HG: CPT | Mod: CPTII,S$GLB,, | Performed by: OTOLARYNGOLOGY

## 2024-03-28 PROCEDURE — 31231 NASAL ENDOSCOPY DX: CPT | Mod: S$GLB,,, | Performed by: OTOLARYNGOLOGY

## 2024-03-28 PROCEDURE — 1159F MED LIST DOCD IN RCRD: CPT | Mod: CPTII,S$GLB,, | Performed by: OTOLARYNGOLOGY

## 2024-03-28 PROCEDURE — 99999 PR PBB SHADOW E&M-EST. PATIENT-LVL IV: CPT | Mod: PBBFAC,,, | Performed by: OTOLARYNGOLOGY

## 2024-03-28 PROCEDURE — 3008F BODY MASS INDEX DOCD: CPT | Mod: CPTII,S$GLB,, | Performed by: OTOLARYNGOLOGY

## 2024-03-28 PROCEDURE — 3075F SYST BP GE 130 - 139MM HG: CPT | Mod: CPTII,S$GLB,, | Performed by: OTOLARYNGOLOGY

## 2024-03-28 PROCEDURE — 99214 OFFICE O/P EST MOD 30 MIN: CPT | Mod: 25,S$GLB,, | Performed by: OTOLARYNGOLOGY

## 2024-03-28 PROCEDURE — 87185 SC STD ENZYME DETCJ PER NZM: CPT | Performed by: OTOLARYNGOLOGY
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
## 2024-03-28 NOTE — PROCEDURES
Nasal/sinus endoscopy    Date/Time: 3/28/2024 9:00 AM    Performed by: Kenroy Pierson MD  Authorized by: Kenroy Pierson MD    Anesthesia:     Local anesthetic:  Lidocaine 4% and José Luis-Synephrine 1/2%    Patient tolerance:  Patient tolerated the procedure well with no immediate complications  Nose:     Procedure Performed:  Nasal Endoscopy  External:      No external nasal deformity  Intranasal:      Mucosa no polyps     Mucosa ulcers not present     No mucosa lesions present     Enlarged turbinates     Septum gross deformity  Nasopharynx:      No mucosa lesions     Adenoids not present     Posterior choanae patent     Biphasic septal deviation, prominent to right  Mucopurulent exudate bilaterally, swabbed for culture on right  Tubal tonsils and ET edema with PND

## 2024-03-30 ENCOUNTER — TELEPHONE (OUTPATIENT)
Dept: ENDOSCOPY | Facility: HOSPITAL | Age: 39
End: 2024-03-30
Payer: COMMERCIAL

## 2024-03-30 ENCOUNTER — OFFICE VISIT (OUTPATIENT)
Dept: URGENT CARE | Facility: CLINIC | Age: 39
End: 2024-03-30
Payer: COMMERCIAL

## 2024-03-30 VITALS
HEART RATE: 77 BPM | WEIGHT: 222 LBS | TEMPERATURE: 98 F | SYSTOLIC BLOOD PRESSURE: 125 MMHG | RESPIRATION RATE: 20 BRPM | DIASTOLIC BLOOD PRESSURE: 87 MMHG | HEIGHT: 65 IN | BODY MASS INDEX: 36.99 KG/M2 | OXYGEN SATURATION: 98 %

## 2024-03-30 DIAGNOSIS — J98.01 COUGH DUE TO BRONCHOSPASM: ICD-10-CM

## 2024-03-30 DIAGNOSIS — J45.901 MILD ASTHMA WITH ACUTE EXACERBATION, UNSPECIFIED WHETHER PERSISTENT: Primary | ICD-10-CM

## 2024-03-30 DIAGNOSIS — R05.9 COUGH, UNSPECIFIED TYPE: ICD-10-CM

## 2024-03-30 LAB
CTP QC/QA: YES
SARS-COV-2 AG RESP QL IA.RAPID: NEGATIVE

## 2024-03-30 PROCEDURE — 96372 THER/PROPH/DIAG INJ SC/IM: CPT | Mod: 59,S$GLB,, | Performed by: FAMILY MEDICINE

## 2024-03-30 PROCEDURE — 99214 OFFICE O/P EST MOD 30 MIN: CPT | Mod: 25,S$GLB,, | Performed by: FAMILY MEDICINE

## 2024-03-30 PROCEDURE — 94640 AIRWAY INHALATION TREATMENT: CPT | Mod: S$GLB,,, | Performed by: FAMILY MEDICINE

## 2024-03-30 PROCEDURE — 87811 SARS-COV-2 COVID19 W/OPTIC: CPT | Mod: QW,S$GLB,, | Performed by: FAMILY MEDICINE

## 2024-03-30 RX ORDER — DOXYCYCLINE HYCLATE 100 MG
100 TABLET ORAL EVERY 12 HOURS
Qty: 14 TABLET | Refills: 0 | Status: SHIPPED | OUTPATIENT
Start: 2024-03-30 | End: 2024-04-06

## 2024-03-30 RX ORDER — PREDNISONE 20 MG/1
40 TABLET ORAL DAILY
Qty: 10 TABLET | Refills: 0 | Status: SHIPPED | OUTPATIENT
Start: 2024-03-30 | End: 2024-04-04

## 2024-03-30 RX ORDER — DEXAMETHASONE SODIUM PHOSPHATE 100 MG/10ML
10 INJECTION INTRAMUSCULAR; INTRAVENOUS
Status: COMPLETED | OUTPATIENT
Start: 2024-03-30 | End: 2024-03-30

## 2024-03-30 RX ORDER — ALBUTEROL SULFATE 0.83 MG/ML
2.5 SOLUTION RESPIRATORY (INHALATION)
Status: COMPLETED | OUTPATIENT
Start: 2024-03-30 | End: 2024-03-30

## 2024-03-30 RX ORDER — ALBUTEROL SULFATE 90 UG/1
2 AEROSOL, METERED RESPIRATORY (INHALATION) EVERY 6 HOURS PRN
Qty: 18 G | Refills: 0 | Status: SHIPPED | OUTPATIENT
Start: 2024-03-30 | End: 2024-04-24

## 2024-03-30 RX ADMIN — ALBUTEROL SULFATE 2.5 MG: 0.83 SOLUTION RESPIRATORY (INHALATION) at 12:03

## 2024-03-30 RX ADMIN — DEXAMETHASONE SODIUM PHOSPHATE 10 MG: 100 INJECTION INTRAMUSCULAR; INTRAVENOUS at 12:03

## 2024-03-30 NOTE — TELEPHONE ENCOUNTER
Spoke with pt. Confirmed importance of hold date of GLP-1 prior to procedure or procedure will be cancelled. Updated instructions sent to pt. Pt verbalized understanding.           Colonoscopy Procedure Prep Instructions        Date of procedure: 4/23/24 Arrive at: 12:45 PM     Location of Department:   [facility] [address] [place] [address]  Take the [facility] Elevators to 4th Floor Endoscopy Lab     As soon as possible:   your prep from pharmacy and over the counter DULCOLAX LAXATIVE TABLETS      On the day before your procedure   What You CAN do:   You may have clear liquids ONLY -see below for list.      Liquids That Are OK to Drink:   Water  Sports drinks (Gatorade, Power-Aid)  Coffee or tea (no cream or nondairy creamer)  Clear juices without pulp (apple, white grape)  Gelatin desserts (no fruit or toppings)  Clear soda (sprite, coke, ginger ale)  Chicken broth (until 12 midnight the night before procedure)        What You CANNOT do:   Do not EAT solid food, drink milk or anything   colored red.  Do not drink alcohol.  Do not take oral medications within 1 hour of starting   each dose of SUPREP.  No gum chewing or candy morning of procedure.        Note:   (Please disregard the insert instructions from pharmacy).  SUPREP Bowel Prep Kit is indicated for cleansing of the colon as a preparation for colonoscopy in adults.   Be sure to tell your doctor about all the medicines you take, including prescription and non-prescription medicines, vitamins, and herbal supplements. SUPREP Bowel Prep Kit may affect how other medicines work.  Medication taken by mouth may not be absorbed properly when taken within 1 hour before the start of each dose of SUPREP Bowel Prep Kit.     It is not uncommon to experience some abdominal cramping, nausea and/or vomiting when taking the prep. If you have nausea and/or vomiting while taking the prep, stop drinking for 20 to 30 minutes then continue.      How to take prep:     SUPREP Bowel Prep Kit is a (2-day) prep.   Both 6-ounce bottles are required for a complete preparation for colonoscopy. Dilute the solution concentrate as directed prior to use. You must drink water with each dose of SUPREP, and additional water after each dose.     DOSE 1--Day Before Colonoscopy 4/22/24     Drink at least 6 to 8 glasses of clear liquids from time you wake up until you begin your prep and then continue until bedtime to avoid dehydration.      12:00 pm (NOON) Take four (4) Dulcolax (Bisacodyl) tablets with at least 8 ounces or more of clear liquids.       6:00 pm:     You must complete Steps 1 through 4 using one (1) 6-ounce bottle before going to bed as shown below:     Step 1-Pour ONE (1) 6-ounce bottle of SUPREP liquid into the mixing container.  Step 2-Add cool drinking water to the 16-ounce line on the container and mix.  Step 3-Drink ALL the liquid in the container.  Step 4-You must drink two (2) more 16-ounce containers of water over the next 1 hour.  IMPORTANT: If you experience preparation-related symptoms (for example, nausea, bloating, or cramping), stop, or slow the rate of drinking the additional water until your symptoms decrease.     DOSE 2--Day of the Colonoscopy 4/23/24 at 7-8 AM     For this dose, repeat Steps 1 through 4 shown above using the other 6-ounce bottle.   You may continue drinking water/clear liquids until   4 hours before your colonoscopy or as directed by the scheduling nurse  9:45 AM.     For more information about your procedure, please watch this informational video. It is important to watch this animated consent video prior to your arrival. If you haven't watched the video prior to arriving, you are required to watch it during admission which can cause delays.      Options for viewing:  Using a keyboard:  press and hold the control tab (Ctrl) and left mouse click to follow link          Colonoscopy Instructional Video                                                          OR     Type link address into your web browser's address bar:  [email or web address]/[email or web address]     Using a mobile phone: tap on web address/link.               IMPORTANT INFORMATION TO KNOW BEFORE YOUR PROCEDURE     [facility] 4th Floor     If your procedure requires the administration of anesthesia, it is necessary for a responsible adult to drive you home. (Medical Transportation, Uber, Lyft, Taxi, etc. may ONLY be used if a responsible adult is present to accompany you home.  The responsible adult CAN'T be the  of the service).       person must be available to return to pick you up within 15 minutes of being notified of discharge.      Due to the limited socially distant seating in our waiting room, please limit your guest (1) who accompany you for this procedure. If someone accompanies you for this procedure into the facility:     Consider having them proceed to an area that is socially distant other than the lobby until a member of the medical team contacts them to provide an update after the procedure.      Also, please consider being dropped off and picked up from the facility.        Please bring a picture ID, insurance card, & copayment     Take Medications as directed below:      If you are taking any injectable medication (s) for weight loss and/or diabetes  weekly, please hold for 8 days prior to your scheduled procedure, please stop Ozempic (Semaglutide)  on 4/15/24. After the procedure, your provider will inform you of when to resume injection.     If you begin taking any blood thinning medications or injectable weight loss/diabetes medications (other than insulin) , please contact the endoscopy scheduling department listed below as soon as possible     If you are diabetic see the attached instruction sheet regarding your medication.      If you take HEART, BLOOD PRESSURE, SEIZURE, PAIN, LUNG (including  inhalers/nebulizers), ANTI-REJECTION (transplant patients), or PSYCHIATRIC medications, please take at your regular times with a sip of water or as directed by the scheduling nurse.      Important contact information:     Endoscopy [phone] Hours of operation Monday-Friday 8:00-4:30pm.     Questions about insurance or financial obligations call [phone] or [phone].     If you have questions regarding the prep or need to reschedule, please call [phone]. After hours questions requiring immediate assistance, contact [facility] On-Call nurse line at [phone] or [phone].   NOTE:      On occasion, unforeseen circumstances may cause a delay in your procedure start time. We respect your time and appreciate your patience during these circumstances.

## 2024-03-30 NOTE — PROGRESS NOTES
"Subjective:      Patient ID: Anette Villa is a 38 y.o. female.    Vitals:  height is 5' 5" (1.651 m) and weight is 100.7 kg (222 lb). Her oral temperature is 98.1 °F (36.7 °C). Her blood pressure is 125/87 and her pulse is 77. Her respiration is 20 and oxygen saturation is 98%.     Chief Complaint: Cough    Pt present with symptoms of- Cough, SOB, Runny Nose, Congestion, Sore Throat ( From Cough) and Headache X 10 days.     Cough  This is a new problem. The current episode started 1 to 4 weeks ago. The problem has been gradually worsening. The problem occurs every few minutes. The cough is Productive of sputum. Associated symptoms include headaches, nasal congestion and shortness of breath. Pertinent negatives include no fever or sore throat. Nothing aggravates the symptoms. She has tried OTC cough suppressant for the symptoms. The treatment provided mild relief. There is no history of asthma, bronchitis or COPD.       Constitution: Negative for fever.   HENT:  Negative for sore throat.    Respiratory:  Positive for cough and shortness of breath.    Neurological:  Positive for headaches.      Objective:     Physical Exam   Constitutional: She is oriented to person, place, and time. She appears well-developed. She is cooperative.  Non-toxic appearance. She does not appear ill. No distress.   HENT:   Head: Normocephalic and atraumatic.   Ears:   Right Ear: Hearing, tympanic membrane and external ear normal.   Left Ear: Hearing, tympanic membrane and external ear normal.   Nose: Nose normal. No mucosal edema, rhinorrhea or nasal deformity. No epistaxis. Right sinus exhibits no maxillary sinus tenderness and no frontal sinus tenderness. Left sinus exhibits no maxillary sinus tenderness and no frontal sinus tenderness.   Mouth/Throat: Uvula is midline, oropharynx is clear and moist and mucous membranes are normal. No trismus in the jaw. Normal dentition. No uvula swelling. No oropharyngeal exudate, posterior " oropharyngeal edema or posterior oropharyngeal erythema.   Eyes: Conjunctivae and lids are normal. No scleral icterus.   Neck: Trachea normal and phonation normal. Neck supple. No edema present. No erythema present. No neck rigidity present.   Cardiovascular: Normal rate, regular rhythm, normal heart sounds and normal pulses.   Pulmonary/Chest: Effort normal. No respiratory distress. She has no decreased breath sounds. She has wheezes. She has no rhonchi.         Comments: Expiratory phase interrupted by persistent coughing which improved with exhaling with pursed lips.      Abdominal: Normal appearance.   Musculoskeletal: Normal range of motion.         General: No deformity. Normal range of motion.   Neurological: She is alert and oriented to person, place, and time. She exhibits normal muscle tone. Coordination normal.   Skin: Skin is warm, dry, intact, not diaphoretic and not pale.   Psychiatric: Her speech is normal and behavior is normal. Judgment and thought content normal.   Nursing note and vitals reviewed.      Assessment:     1. Mild asthma with acute exacerbation, unspecified whether persistent    2. Cough, unspecified type    3. Cough due to bronchospasm        Plan:       Mild asthma with acute exacerbation, unspecified whether persistent  -     albuterol (VENTOLIN HFA) 90 mcg/actuation inhaler; Inhale 2 puffs into the lungs every 6 (six) hours as needed for Wheezing. Rescue  Dispense: 18 g; Refill: 0  -     inhalation spacing device; Use as directed for inhalation.  Dispense: 1 each; Refill: 0  -     doxycycline (VIBRA-TABS) 100 MG tablet; Take 1 tablet (100 mg total) by mouth every 12 (twelve) hours. for 7 days  Dispense: 14 tablet; Refill: 0    Cough, unspecified type  -     SARS Coronavirus 2 Antigen, POCT Manual Read    Cough due to bronchospasm  -     albuterol nebulizer solution 2.5 mg  -     dexAMETHasone injection 10 mg  -     predniSONE (DELTASONE) 20 MG tablet; Take 2 tablets (40 mg total)  by mouth once daily. for 5 days  Dispense: 10 tablet; Refill: 0      Thank you for choosing [facility]!     Our goal in the Urgent Care is to always provide outstanding medical care. You may receive a survey by mail or e-mail in the next week regarding your experience today. We would greatly appreciate you completing and returning the survey. Your feedback provides us with a way to recognize our staff who provide very good care, and it helps us learn how to improve when your experience was below our aspiration of excellence.       We appreciate you trusting us with your medical care. We hope you feel better soon. We will be happy to take care of you for all of your future medical needs.  You must understand that you've received an Urgent Care treatment only and that you may be released before all your medical problems are known or treated. You, the patient, will arrange for follow up care as instructed.  Follow up with your PCP or specialty clinic as directed in the next 1-2 weeks if not improved or as needed.  You can call [phone] to schedule an appointment with the appropriate provider.  Another option is to follow up with [facility] ([email or web address]) virtually for quick simple medical advice.  If your condition worsens we recommend that you receive another evaluation at the emergency room immediately or contact your primary medical clinics after hours call service to discuss your concerns.  Please return here or go to the Emergency Department for any concerns or worsening of condition.      *If you were prescribed a narcotic or controlled medication, do not drive or operate heavy equipment or machinery while taking these medications.

## 2024-03-31 LAB — BACTERIA SPEC AEROBE CULT: ABNORMAL

## 2024-04-01 LAB — BACTERIA SPEC ANAEROBE CULT: NORMAL

## 2024-04-08 ENCOUNTER — PATIENT MESSAGE (OUTPATIENT)
Dept: OTOLARYNGOLOGY | Facility: CLINIC | Age: 39
End: 2024-04-08
Payer: COMMERCIAL

## 2024-04-11 ENCOUNTER — PATIENT MESSAGE (OUTPATIENT)
Dept: OBSTETRICS AND GYNECOLOGY | Facility: CLINIC | Age: 39
End: 2024-04-11
Payer: COMMERCIAL

## 2024-04-18 ENCOUNTER — TELEPHONE (OUTPATIENT)
Dept: FAMILY MEDICINE | Facility: CLINIC | Age: 39
End: 2024-04-18
Payer: COMMERCIAL

## 2024-04-18 ENCOUNTER — OFFICE VISIT (OUTPATIENT)
Dept: URGENT CARE | Facility: CLINIC | Age: 39
End: 2024-04-18
Payer: COMMERCIAL

## 2024-04-18 VITALS
RESPIRATION RATE: 18 BRPM | HEIGHT: 65 IN | HEART RATE: 80 BPM | SYSTOLIC BLOOD PRESSURE: 122 MMHG | TEMPERATURE: 98 F | WEIGHT: 222 LBS | OXYGEN SATURATION: 98 % | BODY MASS INDEX: 36.99 KG/M2 | DIASTOLIC BLOOD PRESSURE: 80 MMHG

## 2024-04-18 DIAGNOSIS — R05.9 COUGH, UNSPECIFIED TYPE: ICD-10-CM

## 2024-04-18 DIAGNOSIS — J32.9 CHRONIC SINUSITIS, UNSPECIFIED LOCATION: Primary | ICD-10-CM

## 2024-04-18 PROBLEM — H52.13 MYOPIA OF BOTH EYES WITH ASTIGMATISM: Status: ACTIVE | Noted: 2023-12-12

## 2024-04-18 PROBLEM — H52.203 MYOPIA OF BOTH EYES WITH ASTIGMATISM: Status: ACTIVE | Noted: 2023-12-12

## 2024-04-18 LAB
CTP QC/QA: YES
SARS-COV-2 AG RESP QL IA.RAPID: NEGATIVE

## 2024-04-18 PROCEDURE — 87811 SARS-COV-2 COVID19 W/OPTIC: CPT | Mod: QW,S$GLB,, | Performed by: FAMILY MEDICINE

## 2024-04-18 PROCEDURE — 99213 OFFICE O/P EST LOW 20 MIN: CPT | Mod: S$GLB,,, | Performed by: FAMILY MEDICINE

## 2024-04-18 RX ORDER — PREDNISONE 20 MG/1
20 TABLET ORAL 2 TIMES DAILY
Qty: 10 TABLET | Refills: 0 | Status: SHIPPED | OUTPATIENT
Start: 2024-04-18 | End: 2024-04-23

## 2024-04-18 RX ORDER — DOXYCYCLINE 100 MG/1
100 CAPSULE ORAL 2 TIMES DAILY
Qty: 20 CAPSULE | Refills: 0 | Status: SHIPPED | OUTPATIENT
Start: 2024-04-18 | End: 2024-04-28

## 2024-04-18 RX ORDER — PROMETHAZINE HYDROCHLORIDE AND DEXTROMETHORPHAN HYDROBROMIDE 6.25; 15 MG/5ML; MG/5ML
5 SYRUP ORAL EVERY 4 HOURS PRN
Qty: 180 ML | Refills: 0 | Status: SHIPPED | OUTPATIENT
Start: 2024-04-18 | End: 2024-04-28

## 2024-04-18 NOTE — TELEPHONE ENCOUNTER
Spoke to pt and stated that she has a URI and requested to be seen today. Pt was scheduled for today for a later appt.

## 2024-04-18 NOTE — PROGRESS NOTES
"Subjective:      Patient ID: Anette Villa is a 38 y.o. female.    Vitals:  height is 5' 5" (1.651 m) and weight is 100.7 kg (222 lb). Her temperature is 98.2 °F (36.8 °C). Her blood pressure is 122/80 and her pulse is 80. Her respiration is 18 and oxygen saturation is 98%.     Chief Complaint: Cough    Patient presents to the clinic with cough, congestion, myalgias, chills x a week.pt allergic to several antibiotics. She was seen by ent a few weeks ago and seen at  for sinusitis. Treated with steroid shot and doxycycline which seemed to help but pt states she was never completely better and now her symptoms have returned.     Cough  This is a new problem. The current episode started 1 to 4 weeks ago. The problem has been waxing and waning. The problem occurs hourly. The cough is Productive of sputum. Associated symptoms include chills, headaches, myalgias, nasal congestion, postnasal drip and a sore throat. She has tried steroid inhaler (antibiotics) for the symptoms.       Constitution: Positive for chills.   HENT:  Positive for postnasal drip and sore throat.    Respiratory:  Positive for cough.    Musculoskeletal:  Positive for muscle ache.   Neurological:  Positive for headaches.      Objective:     Physical Exam   Constitutional: She is oriented to person, place, and time. She appears well-developed. She is cooperative.  Non-toxic appearance. She does not appear ill. No distress.   HENT:   Head: Normocephalic and atraumatic.   Ears:   Right Ear: Hearing, tympanic membrane, external ear and ear canal normal.   Left Ear: Hearing, tympanic membrane, external ear and ear canal normal.   Nose: Congestion present. No mucosal edema, rhinorrhea or nasal deformity. No epistaxis. Right sinus exhibits no maxillary sinus tenderness and no frontal sinus tenderness. Left sinus exhibits no maxillary sinus tenderness and no frontal sinus tenderness.   Mouth/Throat: Uvula is midline, oropharynx is clear and moist and " mucous membranes are normal. Mucous membranes are moist. No trismus in the jaw. Normal dentition. No uvula swelling. No oropharyngeal exudate, posterior oropharyngeal edema or posterior oropharyngeal erythema. Oropharynx is clear.   Eyes: Conjunctivae and lids are normal. No scleral icterus.   Neck: Trachea normal and phonation normal. Neck supple. No edema present. No erythema present. No neck rigidity present.   Cardiovascular: Normal rate, regular rhythm, normal heart sounds and normal pulses.   Pulmonary/Chest: Effort normal and breath sounds normal. No respiratory distress. She has no decreased breath sounds. She has no rhonchi.         Comments: Sporadic croupy upper airway sounds    Abdominal: Normal appearance.   Musculoskeletal: Normal range of motion.         General: No deformity or edema. Normal range of motion.   Neurological: She is alert and oriented to person, place, and time. She exhibits normal muscle tone. Coordination normal.   Skin: Skin is warm, dry, intact, not diaphoretic and not pale.   Psychiatric: Her speech is normal and behavior is normal. Judgment and thought content normal.   Nursing note and vitals reviewed.      Assessment:     1. Chronic sinusitis, unspecified location    2. Cough, unspecified type        Plan:       Chronic sinusitis, unspecified location    Cough, unspecified type  -     SARS Coronavirus 2 Antigen, POCT Manual Read    Other orders  -     predniSONE (DELTASONE) 20 MG tablet; Take 1 tablet (20 mg total) by mouth 2 (two) times daily. for 5 days  Dispense: 10 tablet; Refill: 0  -     doxycycline (VIBRAMYCIN) 100 MG Cap; Take 1 capsule (100 mg total) by mouth 2 (two) times daily. for 10 days  Dispense: 20 capsule; Refill: 0  -     promethazine-dextromethorphan (PROMETHAZINE-DM) 6.25-15 mg/5 mL Syrp; Take 5 mLs by mouth every 4 (four) hours as needed.  Dispense: 180 mL; Refill: 0    Pt or guardian provided educational materials and instructions regarding their visit  diagnosis.

## 2024-04-18 NOTE — TELEPHONE ENCOUNTER
----- Message from Moreno Wright sent at 4/18/2024  8:14 AM CDT -----  Contact: Pt  .Type:  Same Day Appointment Request    Caller is requesting a same day appointment.  Caller declined first available appointment listed below.    Name of Caller: pt  When is the first available appointment?  Symptoms: illness   Best Call Back Number:[phone]  Additional Information:

## 2024-04-19 ENCOUNTER — TELEPHONE (OUTPATIENT)
Dept: ENDOSCOPY | Facility: HOSPITAL | Age: 39
End: 2024-04-19
Payer: COMMERCIAL

## 2024-04-19 NOTE — TELEPHONE ENCOUNTER
Contacted patient to schedule EGD and Colonoscopy on 4/23/24. Spoke with pt's mother and left message to call main line. Sent portal message requesting call back to confirm.

## 2024-04-23 ENCOUNTER — HOSPITAL ENCOUNTER (OUTPATIENT)
Facility: HOSPITAL | Age: 39
Discharge: HOME | End: 2024-04-23
Attending: INTERNAL MEDICINE | Admitting: INTERNAL MEDICINE
Payer: COMMERCIAL

## 2024-04-23 ENCOUNTER — ANESTHESIA (OUTPATIENT)
Dept: ENDOSCOPY | Facility: HOSPITAL | Age: 39
End: 2024-04-23
Payer: COMMERCIAL

## 2024-04-23 ENCOUNTER — ANESTHESIA EVENT (OUTPATIENT)
Dept: ENDOSCOPY | Facility: HOSPITAL | Age: 39
End: 2024-04-23
Payer: COMMERCIAL

## 2024-04-23 VITALS
HEIGHT: 65 IN | OXYGEN SATURATION: 100 % | DIASTOLIC BLOOD PRESSURE: 80 MMHG | RESPIRATION RATE: 18 BRPM | SYSTOLIC BLOOD PRESSURE: 121 MMHG | BODY MASS INDEX: 36.65 KG/M2 | WEIGHT: 220 LBS | TEMPERATURE: 98 F | HEART RATE: 60 BPM

## 2024-04-23 DIAGNOSIS — R19.7 DIARRHEA IN ADULT PATIENT: ICD-10-CM

## 2024-04-23 LAB
B-HCG UR QL: NEGATIVE
CTP QC/QA: YES

## 2024-04-23 PROCEDURE — 37000008 HC ANESTHESIA 1ST 15 MINUTES: Performed by: INTERNAL MEDICINE

## 2024-04-23 PROCEDURE — 88342 IMHCHEM/IMCYTCHM 1ST ANTB: CPT | Performed by: PATHOLOGY

## 2024-04-23 PROCEDURE — 43239 EGD BIOPSY SINGLE/MULTIPLE: CPT | Mod: ,,, | Performed by: INTERNAL MEDICINE

## 2024-04-23 PROCEDURE — 45385 COLONOSCOPY W/LESION REMOVAL: CPT | Mod: ,,, | Performed by: INTERNAL MEDICINE

## 2024-04-23 PROCEDURE — 45380 COLONOSCOPY AND BIOPSY: CPT | Mod: 59 | Performed by: INTERNAL MEDICINE

## 2024-04-23 PROCEDURE — 37000009 HC ANESTHESIA EA ADD 15 MINS: Performed by: INTERNAL MEDICINE

## 2024-04-23 PROCEDURE — 88305 TISSUE EXAM BY PATHOLOGIST: CPT | Performed by: PATHOLOGY

## 2024-04-23 PROCEDURE — E9220 PRA ENDO ANESTHESIA: HCPCS | Mod: ,,, | Performed by: NURSE ANESTHETIST, CERTIFIED REGISTERED

## 2024-04-23 PROCEDURE — 27201089 HC SNARE, DISP (ANY): Performed by: INTERNAL MEDICINE

## 2024-04-23 PROCEDURE — 88342 IMHCHEM/IMCYTCHM 1ST ANTB: CPT | Mod: 26,,, | Performed by: PATHOLOGY

## 2024-04-23 PROCEDURE — 27201012 HC FORCEPS, HOT/COLD, DISP: Performed by: INTERNAL MEDICINE

## 2024-04-23 PROCEDURE — 45380 COLONOSCOPY AND BIOPSY: CPT | Mod: 59,,, | Performed by: INTERNAL MEDICINE

## 2024-04-23 PROCEDURE — 88305 TISSUE EXAM BY PATHOLOGIST: CPT | Mod: 26,,, | Performed by: PATHOLOGY

## 2024-04-23 PROCEDURE — 81025 URINE PREGNANCY TEST: CPT | Performed by: INTERNAL MEDICINE

## 2024-04-23 PROCEDURE — 45385 COLONOSCOPY W/LESION REMOVAL: CPT | Performed by: INTERNAL MEDICINE

## 2024-04-23 PROCEDURE — 43239 EGD BIOPSY SINGLE/MULTIPLE: CPT | Performed by: INTERNAL MEDICINE

## 2024-04-23 PROCEDURE — 63600175 PHARM REV CODE 636 W HCPCS: Performed by: NURSE ANESTHETIST, CERTIFIED REGISTERED

## 2024-04-23 PROCEDURE — 25000003 PHARM REV CODE 250: Performed by: NURSE ANESTHETIST, CERTIFIED REGISTERED

## 2024-04-23 PROCEDURE — 82657 ENZYME CELL ACTIVITY: CPT | Performed by: PATHOLOGY

## 2024-04-23 RX ORDER — SODIUM CHLORIDE 9 MG/ML
INJECTION, SOLUTION INTRAVENOUS CONTINUOUS
Status: DISCONTINUED | OUTPATIENT
Start: 2024-04-23 | End: 2024-04-23 | Stop reason: HOSPADM

## 2024-04-23 RX ORDER — PROPOFOL 10 MG/ML
VIAL (ML) INTRAVENOUS
Status: DISCONTINUED | OUTPATIENT
Start: 2024-04-23 | End: 2024-04-23

## 2024-04-23 RX ORDER — LIDOCAINE HYDROCHLORIDE 20 MG/ML
INJECTION, SOLUTION EPIDURAL; INFILTRATION; INTRACAUDAL; PERINEURAL
Status: DISCONTINUED | OUTPATIENT
Start: 2024-04-23 | End: 2024-04-23

## 2024-04-23 RX ORDER — ONDANSETRON HYDROCHLORIDE 2 MG/ML
4 INJECTION, SOLUTION INTRAVENOUS ONCE
Status: DISCONTINUED | OUTPATIENT
Start: 2024-04-23 | End: 2024-04-23 | Stop reason: HOSPADM

## 2024-04-23 RX ORDER — ONDANSETRON 4 MG/1
4 TABLET, ORALLY DISINTEGRATING ORAL ONCE
Status: DISCONTINUED | OUTPATIENT
Start: 2024-04-23 | End: 2024-04-23 | Stop reason: HOSPADM

## 2024-04-23 RX ADMIN — PROPOFOL 50 MG: 10 INJECTION, EMULSION INTRAVENOUS at 02:04

## 2024-04-23 RX ADMIN — SODIUM CHLORIDE: 9 INJECTION, SOLUTION INTRAVENOUS at 02:04

## 2024-04-23 RX ADMIN — PROPOFOL 60 MG: 10 INJECTION, EMULSION INTRAVENOUS at 02:04

## 2024-04-23 RX ADMIN — LIDOCAINE HYDROCHLORIDE 100 MG: 20 INJECTION, SOLUTION EPIDURAL; INFILTRATION; INTRACAUDAL; PERINEURAL at 02:04

## 2024-04-23 RX ADMIN — PROPOFOL 50 MG: 10 INJECTION, EMULSION INTRAVENOUS at 03:04

## 2024-04-23 NOTE — PROVATION PATIENT INSTRUCTIONS
Discharge Summary/Instructions after an Endoscopic Procedure  Patient Name: Anette Villa  Patient MRN: [id number]  Patient YOB: 1985 Tuesday, April 23, 2024  William Gusman MD  Dear patient,  As a result of recent federal legislation (The Federal Cures Act), you may   receive lab or pathology results from your procedure in your [facility]   account before your physician is able to contact you. Your physician or   their representative will relay the results to you with their   recommendations at their soonest availability.  Thank you,  RESTRICTIONS:  During your procedure today, you received medications for sedation.  These   medications may affect your judgment, balance and coordination.  Therefore,   for 24 hours, you have the following restrictions:   - DO NOT drive a car, operate machinery, make legal/financial decisions,   sign important papers or drink alcohol.    ACTIVITY:  Today: no heavy lifting, straining or running due to procedural   sedation/anesthesia.  The following day: return to full activity including work.  DIET:  Eat and drink normally unless instructed otherwise.     TREATMENT FOR COMMON SIDE EFFECTS:  - Mild abdominal pain, nausea, belching, bloating or excessive gas:  rest,   eat lightly and use a heating pad.  - Sore Throat: treat with throat lozenges and/or gargle with warm salt   water.  - Because air was used during the procedure, expelling large amounts of air   from your rectum or belching is normal.  - If a bowel prep was taken, you may not have a bowel movement for 1-3 days.    This is normal.  SYMPTOMS TO WATCH FOR AND REPORT TO YOUR PHYSICIAN:  1. Abdominal pain or bloating, other than gas cramps.  2. Chest pain.  3. Back pain.  4. Signs of infection such as: chills or fever occurring within 24 hours   after the procedure.  5. Rectal bleeding, which would show as bright red, maroon, or black stools.   (A tablespoon of blood from the rectum is not serious, especially  if   hemorrhoids are present.)  6. Vomiting.  7. Weakness or dizziness.  GO DIRECTLY TO THE NEAREST EMERGENCY ROOM IF YOU HAVE ANY OF THE FOLLOWING:      Difficulty breathing              Chills and/or fever over 101 F   Persistent vomiting and/or vomiting blood   Severe abdominal pain   Severe chest pain   Black, tarry stools   Bleeding- more than one tablespoon   Any other symptom or condition that you feel may need urgent attention  Your doctor recommends these additional instructions:  If any biopsies were taken, your doctors clinic will contact you in 1 to 2   weeks with any results.  - Discharge patient to home.   - Await pathology results.   - Telephone endoscopist for pathology results in 3 weeks.   - Repeat colonoscopy in 7 years for surveillance based on pathology results.     - Return to GI clinic at the next available appointment.   - Return to primary care physician.   - The findings and recommendations were discussed with the patient.  For questions, problems or results please call your physician - William Gusman MD at Work:  [phone].  [facility], EMERGENCY ROOM PHONE NUMBER: [phone]  IF A COMPLICATION OR EMERGENCY SITUATION ARISES AND YOU ARE UNABLE TO REACH   YOUR PHYSICIAN - GO DIRECTLY TO THE EMERGENCY ROOM.  William Gusman MD  4/23/2024 3:19:10 PM  This report has been verified and signed electronically.  Dear patient,  As a result of recent federal legislation (The Federal Cures Act), you may   receive lab or pathology results from your procedure in your [facility]   account before your physician is able to contact you. Your physician or   their representative will relay the results to you with their   recommendations at their soonest availability.  Thank you,  PROVATION
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
## 2024-04-23 NOTE — ANESTHESIA POSTPROCEDURE EVALUATION
Anesthesia Post Evaluation    Patient: Anette Villa    Procedure(s) Performed: Procedure(s) (LRB):  EGD (ESOPHAGOGASTRODUODENOSCOPY) (N/A)  COLONOSCOPY (N/A)    Final Anesthesia Type: general      Patient location during evaluation: PACU  Patient participation: Yes- Able to Participate  Level of consciousness: awake and alert  Post-procedure vital signs: reviewed and stable  Pain management: adequate  Airway patency: patent    PONV status at discharge: No PONV  Anesthetic complications: no      Cardiovascular status: blood pressure returned to baseline  Respiratory status: unassisted  Hydration status: euvolemic  Follow-up not needed.              Vitals Value Taken Time   /80 04/23/24 1553   Temp 36.7 °C (98 °F) 04/23/24 1518   Pulse 60 04/23/24 1553   Resp 18 04/23/24 1553   SpO2 100 % 04/23/24 1553         Event Time   Out of Recovery 16:03:05         Pain/Nirali Score: Nirali Score: 10 (4/23/2024  3:19 PM)

## 2024-04-23 NOTE — PROVATION PATIENT INSTRUCTIONS
Discharge Summary/Instructions after an Endoscopic Procedure  Patient Name: Anette Villa  Patient MRN: [id number]  Patient YOB: 1985 Tuesday, April 23, 2024  William Gusman MD  Dear patient,  As a result of recent federal legislation (The Federal Cures Act), you may   receive lab or pathology results from your procedure in your [facility]   account before your physician is able to contact you. Your physician or   their representative will relay the results to you with their   recommendations at their soonest availability.  Thank you,  RESTRICTIONS:  During your procedure today, you received medications for sedation.  These   medications may affect your judgment, balance and coordination.  Therefore,   for 24 hours, you have the following restrictions:   - DO NOT drive a car, operate machinery, make legal/financial decisions,   sign important papers or drink alcohol.    ACTIVITY:  Today: no heavy lifting, straining or running due to procedural   sedation/anesthesia.  The following day: return to full activity including work.  DIET:  Eat and drink normally unless instructed otherwise.     TREATMENT FOR COMMON SIDE EFFECTS:  - Mild abdominal pain, nausea, belching, bloating or excessive gas:  rest,   eat lightly and use a heating pad.  - Sore Throat: treat with throat lozenges and/or gargle with warm salt   water.  - Because air was used during the procedure, expelling large amounts of air   from your rectum or belching is normal.  - If a bowel prep was taken, you may not have a bowel movement for 1-3 days.    This is normal.  SYMPTOMS TO WATCH FOR AND REPORT TO YOUR PHYSICIAN:  1. Abdominal pain or bloating, other than gas cramps.  2. Chest pain.  3. Back pain.  4. Signs of infection such as: chills or fever occurring within 24 hours   after the procedure.  5. Rectal bleeding, which would show as bright red, maroon, or black stools.   (A tablespoon of blood from the rectum is not serious, especially  if   hemorrhoids are present.)  6. Vomiting.  7. Weakness or dizziness.  GO DIRECTLY TO THE NEAREST EMERGENCY ROOM IF YOU HAVE ANY OF THE FOLLOWING:      Difficulty breathing              Chills and/or fever over 101 F   Persistent vomiting and/or vomiting blood   Severe abdominal pain   Severe chest pain   Black, tarry stools   Bleeding- more than one tablespoon   Any other symptom or condition that you feel may need urgent attention  Your doctor recommends these additional instructions:  If any biopsies were taken, your doctors clinic will contact you in 1 to 2   weeks with any results.  - Discharge patient to home.   - Follow an antireflux regimen indefinitely.   - Await pathology results.   - Telephone endoscopist for pathology results in 3 weeks.   - Repeat upper endoscopy in 3 years for surveillance based on pathology   results.   - Return to GI clinic at the next available appointment.   - The findings and recommendations were discussed with the patient.  For questions, problems or results please call your physician - William Gusman MD at Work:  [phone].  [facility], EMERGENCY ROOM PHONE NUMBER: [phone]  IF A COMPLICATION OR EMERGENCY SITUATION ARISES AND YOU ARE UNABLE TO REACH   YOUR PHYSICIAN - GO DIRECTLY TO THE EMERGENCY ROOM.  William Gusman MD  4/23/2024 3:20:01 PM  This report has been verified and signed electronically.  Dear patient,  As a result of recent federal legislation (The Federal Cures Act), you may   receive lab or pathology results from your procedure in your [facility]   account before your physician is able to contact you. Your physician or   their representative will relay the results to you with their   recommendations at their soonest availability.  Thank you,  PROVATION

## 2024-04-24 DIAGNOSIS — J45.901 MILD ASTHMA WITH ACUTE EXACERBATION, UNSPECIFIED WHETHER PERSISTENT: ICD-10-CM

## 2024-04-24 RX ORDER — ALBUTEROL SULFATE 90 UG/1
2 AEROSOL, METERED RESPIRATORY (INHALATION) EVERY 6 HOURS PRN
Qty: 16 G | Refills: 3 | Status: SHIPPED | OUTPATIENT
Start: 2024-04-24

## 2024-04-25 LAB
FINAL PATHOLOGIC DIAGNOSIS: NORMAL
Lab: NORMAL

## 2024-04-30 ENCOUNTER — PATIENT MESSAGE (OUTPATIENT)
Dept: GASTROENTEROLOGY | Facility: CLINIC | Age: 39
End: 2024-04-30
Payer: COMMERCIAL

## 2024-04-30 LAB
FINAL PATHOLOGIC DIAGNOSIS: NORMAL
GROSS: NORMAL
Lab: NORMAL
MICROSCOPIC EXAM: NORMAL

## 2024-04-30 NOTE — PROGRESS NOTES
Anette your EGD colonoscopy pathology was benign no cause for diarrhea identified recommend your next EGD in 3 years    Anette Recommend your next colonoscopy in 7 years polyps was an adenoma that has a benign colon polyp but a precancerous type of colon polyp but completely benign.      1. Duodenum (biopsy):  Duodenal mucosa with no significant histopathologic changes  No support for celiac disease  No pathogens identified    2. Stomach (biopsy):  Mild chronic antral gastritis, no activity  No Helicobacter organisms (routine and immunostain)    3. Distal esophagus 38-39 cm (biopsy):  No intestinal metaplasia, no dysplasia  Squamous glandular mucosa with chronic inflammation.  Glandular mucosa comprised of surface foveolar epithelium and mucus type glands    4. Terminal ileum (biopsy):  Small intestine mucosa with no significant histopathologic changes    5. Colon, random (biopsy):  Colonic mucosa with no significant histopathologic changes  No support for microscopic colitis    6. Colon, transverse 1 mm polyp (polypectomy):  Tubular adenoma    7. Rectal polyp (polypectomy):  Hyperplastic changes.  No dysplasia     Comment: Interp By Fransisca Seals M.D., Signed on 04/30/2024

## 2024-05-01 ENCOUNTER — OFFICE VISIT (OUTPATIENT)
Dept: FAMILY MEDICINE | Facility: CLINIC | Age: 39
End: 2024-05-01
Attending: FAMILY MEDICINE
Payer: COMMERCIAL

## 2024-05-01 ENCOUNTER — HOSPITAL ENCOUNTER (OUTPATIENT)
Dept: RADIOLOGY | Facility: HOSPITAL | Age: 39
Discharge: HOME | End: 2024-05-01
Attending: FAMILY MEDICINE
Payer: COMMERCIAL

## 2024-05-01 VITALS
SYSTOLIC BLOOD PRESSURE: 114 MMHG | OXYGEN SATURATION: 99 % | HEIGHT: 65 IN | HEART RATE: 86 BPM | BODY MASS INDEX: 38.24 KG/M2 | DIASTOLIC BLOOD PRESSURE: 74 MMHG | WEIGHT: 229.5 LBS

## 2024-05-01 DIAGNOSIS — E66.01 SEVERE OBESITY (BMI 35.0-35.9 WITH COMORBIDITY): ICD-10-CM

## 2024-05-01 DIAGNOSIS — R05.3 CHRONIC COUGH: ICD-10-CM

## 2024-05-01 DIAGNOSIS — R05.3 CHRONIC COUGH: Primary | ICD-10-CM

## 2024-05-01 DIAGNOSIS — I10 PRIMARY HYPERTENSION: ICD-10-CM

## 2024-05-01 PROCEDURE — 3008F BODY MASS INDEX DOCD: CPT | Mod: CPTII,S$GLB,, | Performed by: FAMILY MEDICINE

## 2024-05-01 PROCEDURE — 71046 X-RAY EXAM CHEST 2 VIEWS: CPT | Mod: 26,,, | Performed by: RADIOLOGY

## 2024-05-01 PROCEDURE — 3074F SYST BP LT 130 MM HG: CPT | Mod: CPTII,S$GLB,, | Performed by: FAMILY MEDICINE

## 2024-05-01 PROCEDURE — 99214 OFFICE O/P EST MOD 30 MIN: CPT | Mod: S$GLB,,, | Performed by: FAMILY MEDICINE

## 2024-05-01 PROCEDURE — 99999 PR PBB SHADOW E&M-EST. PATIENT-LVL IV: CPT | Mod: PBBFAC,,, | Performed by: FAMILY MEDICINE

## 2024-05-01 PROCEDURE — 1160F RVW MEDS BY RX/DR IN RCRD: CPT | Mod: CPTII,S$GLB,, | Performed by: FAMILY MEDICINE

## 2024-05-01 PROCEDURE — 1159F MED LIST DOCD IN RCRD: CPT | Mod: CPTII,S$GLB,, | Performed by: FAMILY MEDICINE

## 2024-05-01 PROCEDURE — 71046 X-RAY EXAM CHEST 2 VIEWS: CPT | Mod: TC,FY

## 2024-05-01 PROCEDURE — 3078F DIAST BP <80 MM HG: CPT | Mod: CPTII,S$GLB,, | Performed by: FAMILY MEDICINE

## 2024-05-01 RX ORDER — BROMPHENIRAMINE MALEATE, PSEUDOEPHEDRINE HYDROCHLORIDE, AND DEXTROMETHORPHAN HYDROBROMIDE 2; 30; 10 MG/5ML; MG/5ML; MG/5ML
5 SYRUP ORAL EVERY 8 HOURS PRN
Qty: 180 ML | Refills: 1 | Status: SHIPPED | OUTPATIENT
Start: 2024-05-01
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
## 2024-07-14 ENCOUNTER — HEALTH MAINTENANCE LETTER (OUTPATIENT)
Age: 39
End: 2024-07-14

## 2024-08-09 ENCOUNTER — OFFICE VISIT (OUTPATIENT)
Dept: FAMILY MEDICINE | Facility: CLINIC | Age: 39
End: 2024-08-09
Payer: COMMERCIAL

## 2024-08-09 VITALS
TEMPERATURE: 99 F | OXYGEN SATURATION: 99 % | HEIGHT: 66 IN | DIASTOLIC BLOOD PRESSURE: 78 MMHG | WEIGHT: 231.25 LBS | BODY MASS INDEX: 37.16 KG/M2 | SYSTOLIC BLOOD PRESSURE: 110 MMHG | HEART RATE: 93 BPM

## 2024-08-09 DIAGNOSIS — F41.0 PANIC ATTACK: ICD-10-CM

## 2024-08-09 DIAGNOSIS — F41.1 GAD (GENERALIZED ANXIETY DISORDER): Primary | ICD-10-CM

## 2024-08-09 PROCEDURE — 99999 PR PBB SHADOW E&M-EST. PATIENT-LVL IV: CPT | Mod: PBBFAC,,, | Performed by: FAMILY MEDICINE

## 2024-08-09 RX ORDER — ALPRAZOLAM 0.25 MG/1
0.25 TABLET ORAL 2 TIMES DAILY PRN
Qty: 20 TABLET | Refills: 0 | Status: SHIPPED | OUTPATIENT
Start: 2024-08-09 | End: 2024-09-08

## 2024-08-09 RX ORDER — HYDROXYZINE HYDROCHLORIDE 25 MG/1
25 TABLET, FILM COATED ORAL 3 TIMES DAILY
Qty: 60 TABLET | Refills: 2 | Status: SHIPPED | OUTPATIENT
Start: 2024-08-09

## 2024-08-09 RX ORDER — ESCITALOPRAM OXALATE 10 MG/1
10 TABLET ORAL DAILY
Qty: 30 TABLET | Refills: 2 | Status: SHIPPED | OUTPATIENT
Start: 2024-08-09 | End: 2025-08-09

## 2024-08-27 DIAGNOSIS — I10 PRIMARY HYPERTENSION: ICD-10-CM

## 2024-08-27 DIAGNOSIS — E66.01 MORBID OBESITY WITH BMI OF 40.0-44.9, ADULT: ICD-10-CM

## 2024-08-27 DIAGNOSIS — K76.0 NAFLD (NONALCOHOLIC FATTY LIVER DISEASE): ICD-10-CM

## 2024-08-27 DIAGNOSIS — Z00.00 ROUTINE GENERAL MEDICAL EXAMINATION AT HEALTH CARE FACILITY: ICD-10-CM

## 2024-08-27 RX ORDER — AMLODIPINE BESYLATE 5 MG/1
5 TABLET ORAL
Qty: 90 TABLET | Refills: 2 | Status: SHIPPED | OUTPATIENT
Start: 2024-08-27

## 2024-08-27 NOTE — TELEPHONE ENCOUNTER
No care due was identified.  [facility] Embedded Care Due Messages. Reference number: [id number].   8/27/2024 12:10:01 AM CDT

## 2024-08-27 NOTE — TELEPHONE ENCOUNTER
Anette Villa  is requesting a refill authorization.  Brief Assessment and Rationale for Refill:  Approve     Medication Therapy Plan:         Comments:     Note composed:4:14 AM 08/27/2024

## 2024-09-11 DIAGNOSIS — E66.811 CLASS 1 OBESITY WITHOUT SERIOUS COMORBIDITY WITH BODY MASS INDEX (BMI) OF 33.0 TO 33.9 IN ADULT, UNSPECIFIED OBESITY TYPE: ICD-10-CM

## 2024-09-12 DIAGNOSIS — F41.9 ANXIETY: ICD-10-CM

## 2024-09-12 RX ORDER — PHENTERMINE HYDROCHLORIDE 37.5 MG/1
37.5 CAPSULE ORAL EVERY MORNING
Qty: 30 CAPSULE | Refills: 5 | Status: SHIPPED | OUTPATIENT
Start: 2024-09-12

## 2024-09-13 RX ORDER — ESCITALOPRAM OXALATE 10 MG/1
10 TABLET ORAL DAILY
Qty: 90 TABLET | Refills: 0 | Status: SHIPPED | OUTPATIENT
Start: 2024-09-13

## 2024-09-20 ENCOUNTER — MYC REFILL (OUTPATIENT)
Dept: FAMILY MEDICINE | Facility: CLINIC | Age: 39
End: 2024-09-20

## 2024-09-20 ENCOUNTER — OFFICE VISIT (OUTPATIENT)
Dept: FAMILY MEDICINE | Facility: CLINIC | Age: 39
End: 2024-09-20
Payer: COMMERCIAL

## 2024-09-20 VITALS
BODY MASS INDEX: 37.76 KG/M2 | RESPIRATION RATE: 14 BRPM | DIASTOLIC BLOOD PRESSURE: 82 MMHG | WEIGHT: 200 LBS | HEART RATE: 87 BPM | SYSTOLIC BLOOD PRESSURE: 126 MMHG | OXYGEN SATURATION: 97 % | HEIGHT: 61 IN

## 2024-09-20 DIAGNOSIS — Z30.41 ENCOUNTER FOR SURVEILLANCE OF CONTRACEPTIVE PILLS: ICD-10-CM

## 2024-09-20 DIAGNOSIS — E66.01 CLASS 2 SEVERE OBESITY WITH SERIOUS COMORBIDITY AND BODY MASS INDEX (BMI) OF 35.0 TO 35.9 IN ADULT, UNSPECIFIED OBESITY TYPE (H): ICD-10-CM

## 2024-09-20 DIAGNOSIS — Z13.220 LIPID SCREENING: ICD-10-CM

## 2024-09-20 DIAGNOSIS — Z00.00 ROUTINE GENERAL MEDICAL EXAMINATION AT A HEALTH CARE FACILITY: Primary | ICD-10-CM

## 2024-09-20 DIAGNOSIS — Z12.4 CERVICAL CANCER SCREENING: ICD-10-CM

## 2024-09-20 DIAGNOSIS — E66.812 CLASS 2 SEVERE OBESITY WITH SERIOUS COMORBIDITY AND BODY MASS INDEX (BMI) OF 35.0 TO 35.9 IN ADULT, UNSPECIFIED OBESITY TYPE (H): ICD-10-CM

## 2024-09-20 LAB
CHOLEST SERPL-MCNC: 199 MG/DL
FASTING STATUS PATIENT QL REPORTED: NO
FASTING STATUS PATIENT QL REPORTED: NO
GLUCOSE SERPL-MCNC: 92 MG/DL (ref 70–99)
HDLC SERPL-MCNC: 79 MG/DL
LDLC SERPL CALC-MCNC: 93 MG/DL
NONHDLC SERPL-MCNC: 120 MG/DL
TRIGL SERPL-MCNC: 133 MG/DL

## 2024-09-20 PROCEDURE — 36415 COLL VENOUS BLD VENIPUNCTURE: CPT | Performed by: FAMILY MEDICINE

## 2024-09-20 PROCEDURE — 82947 ASSAY GLUCOSE BLOOD QUANT: CPT | Performed by: FAMILY MEDICINE

## 2024-09-20 PROCEDURE — G0145 SCR C/V CYTO,THINLAYER,RESCR: HCPCS | Performed by: FAMILY MEDICINE

## 2024-09-20 PROCEDURE — 80061 LIPID PANEL: CPT | Performed by: FAMILY MEDICINE

## 2024-09-20 PROCEDURE — 87624 HPV HI-RISK TYP POOLED RSLT: CPT | Performed by: FAMILY MEDICINE

## 2024-09-20 PROCEDURE — 99395 PREV VISIT EST AGE 18-39: CPT | Performed by: FAMILY MEDICINE

## 2024-09-20 RX ORDER — NORGESTIMATE AND ETHINYL ESTRADIOL 7DAYSX3 28
1 KIT ORAL DAILY
Qty: 84 TABLET | Refills: 4 | Status: CANCELLED | OUTPATIENT
Start: 2024-09-20

## 2024-09-20 RX ORDER — NORGESTIMATE AND ETHINYL ESTRADIOL 7DAYSX3 28
1 KIT ORAL DAILY
Qty: 84 TABLET | Refills: 4 | Status: SHIPPED | OUTPATIENT
Start: 2024-09-20

## 2024-09-20 ASSESSMENT — PAIN SCALES - GENERAL: PAINLEVEL: NO PAIN (0)

## 2024-09-20 NOTE — PROGRESS NOTES
"Preventive Care Visit  [facility]  SOPHIA TAPIA DO, Family Medicine  Sep 20, 2024      Assessment & Plan     Routine general medical examination at a health care facility      Cervical cancer screening  - HPV and Gynecologic Cytology Panel - Recommended Age 30 - 65 Years  - Glucose    Class 2 severe obesity with serious comorbidity and body mass index (BMI) of 35.0 to 35.9 in adult, unspecified obesity type (H)      Lipid screening  - Lipid panel reflex to direct LDL Fasting    Encounter for surveillance of contraceptive pills  - norgestim-eth estrad triphasic (TRI-SPRINTEC) 0.18/0.215/0.25 MG-35 MCG tablet  Dispense: 84 tablet; Refill: 4      BMI  Estimated body mass index is 37.48 kg/m  as calculated from the following:    Height as of this encounter: 1.556 m (5' 1.25\").    Weight as of this encounter: 90.7 kg (200 lb).   Weight management plan: Discussed healthy diet and exercise guidelines    Counseling  Appropriate preventive services were addressed with this patient via screening, questionnaire, or discussion as appropriate for fall prevention, nutrition, physical activity, Tobacco-use cessation, social engagement, weight loss and cognition.  Checklist reviewing preventive services available has been given to the patient.  Reviewed patient's diet, addressing concerns and/or questions.   The patient was instructed to see the dentist every 6 months.       Brendan Still is a 39 year old, presenting for the following:  Physical        9/20/2024     1:07 PM   Additional Questions   Roomed by Anny BARBER MA   Accompanied by Self        Health Care Directive  Patient does not have a Health Care Directive or Living Will: Discussed advance care planning with patient; information given to patient to review.    HPI  Recently started exercising.         9/20/2024   General Health   How would you rate your overall physical health? Good   Feel stress (tense, anxious, or unable to sleep) Not " at all          9/20/2024   Nutrition   Three or more servings of calcium each day? (!) NO   Diet: Regular (no restrictions)   How many servings of fruit and vegetables per day? (!) 2-3   How many sweetened beverages each day? 0-1          9/20/2024   Exercise   Days per week of moderate/strenous exercise 5 days          9/20/2024   Social Factors   Frequency of gathering with friends or relatives More than three times a week   Worry food won't last until get money to buy more No   Food not last or not have enough money for food? No   Do you have housing? (Housing is defined as stable permanent housing and does not include staying ouside in a car, in a tent, in an abandoned building, in an overnight shelter, or couch-surfing.) Yes   Are you worried about losing your housing? No   Lack of transportation? No   Unable to get utilities (heat,electricity)? No          9/20/2024   Dental   Dentist two times every year? (!) NO          9/20/2024   TB Screening   Were you born outside of the [place]? No            Today's PHQ-2 Score:       9/20/2024     7:33 AM   PHQ-2 ( 1999 [employer])   Q1: Little interest or pleasure in doing things 0   Q2: Feeling down, depressed or hopeless 0   PHQ-2 Score 0   Q1: Little interest or pleasure in doing things Not at all   Q2: Feeling down, depressed or hopeless Not at all   PHQ-2 Score 0           9/20/2024   Substance Use   Alcohol more than 3/day or more than 7/wk No   Do you use any other substances recreationally? No        Social History     Tobacco Use    Smoking status: Never     Passive exposure: Never    Smokeless tobacco: Never   Vaping Use    Vaping status: Never Used   Substance Use Topics    Alcohol use: Yes    Drug use: Never      Mammogram Screening - Mammogram every 1-2 years updated in Health Maintenance based on mutual decision making          9/20/2024   One time HIV Screening   Previous HIV test? No          9/20/2024   STI Screening   New sexual partner(s) since last  "STI/HIV test? No        History of abnormal Pap smear: No - age 30-64 HPV with reflex Pap every 5 years recommended        8/3/2018    12:00 AM   PAP / HPV   PAP-ABSTRACT See Scanned Document           This result is from an external source.           9/20/2024   Contraception/Family Planning   Questions about contraception or family planning No      Reviewed and updated as needed this visit by Provider         Dhiraj Jimenez            No past medical history on file.  Past Surgical History:   Procedure Laterality Date    ESOPHAGOSCOPY, GASTROSCOPY, DUODENOSCOPY (EGD), COMBINED N/A 3/31/2021    Procedure: ESOPHAGOGASTRODUODENOSCOPY, WITH BIOPSY;  Surgeon: Gerry Chiu DO;  Location: [facility]     Review of Systems  Constitutional, HEENT, cardiovascular, pulmonary, gi and gu systems are negative, except as otherwise noted.     Objective    Exam  /82 (BP Location: Right arm, Patient Position: Chair, Cuff Size: Adult Large)   Pulse 87   Resp 14   Ht 1.556 m (5' 1.25\")   Wt 90.7 kg (200 lb)   LMP 09/08/2024   SpO2 97%   BMI 37.48 kg/m     Estimated body mass index is 37.48 kg/m  as calculated from the following:    Height as of this encounter: 1.556 m (5' 1.25\").    Weight as of this encounter: 90.7 kg (200 lb).    Physical Exam  Constitutional:       Appearance: Normal appearance.   HENT:      Head: Normocephalic.      Right Ear: Tympanic membrane normal.      Left Ear: Tympanic membrane normal.      Mouth/Throat:      Mouth: Mucous membranes are moist.   Eyes:      Conjunctiva/sclera: Conjunctivae normal.   Cardiovascular:      Rate and Rhythm: Normal rate and regular rhythm.      Pulses: Normal pulses.   Pulmonary:      Effort: Pulmonary effort is normal.      Breath sounds: Normal breath sounds.   Abdominal:      General: Bowel sounds are normal.   Musculoskeletal:      Right lower leg: No edema.      Left lower leg: No edema.   Skin:     General: Skin is warm and dry.   Neurological:      General: No " focal deficit present.      Mental Status: She is alert.   Psychiatric:         Thought Content: Thought content normal.         Judgment: Judgment normal.         Signed Electronically by: SOPHIA TAPIA DO

## 2024-09-20 NOTE — PATIENT INSTRUCTIONS
Patient Education   Preventive Care Advice   This is general advice given by our system to help you stay healthy. However, your care team may have specific advice just for you. Please talk to your care team about your preventive care needs.  Nutrition  Eat 5 or more servings of fruits and vegetables each day.  Try wheat bread, brown rice and whole grain pasta (instead of white bread, rice, and pasta).  Get enough calcium and vitamin D. Check the label on foods and aim for 100% of the RDA (recommended daily allowance).  Lifestyle  Exercise at least 150 minutes each week  (30 minutes a day, 5 days a week).  Do muscle strengthening activities 2 days a week. These help control your weight and prevent disease.  No smoking.  Wear sunscreen to prevent skin cancer.  Have a dental exam and cleaning every 6 months.  Yearly exams  See your health care team every year to talk about:  Any changes in your health.  Any medicines your care team has prescribed.  Preventive care, family planning, and ways to prevent chronic diseases.  Shots (vaccines)   HPV shots (up to age 26), if you've never had them before.  Hepatitis B shots (up to age 59), if you've never had them before.  COVID-19 shot: Get this shot when it's due.  Flu shot: Get a flu shot every year.  Tetanus shot: Get a tetanus shot every 10 years.  Pneumococcal, hepatitis A, and RSV shots: Ask your care team if you need these based on your risk.  Shingles shot (for age 50 and up)  General health tests  Diabetes screening:  Starting at age 35, Get screened for diabetes at least every 3 years.  If you are younger than age 35, ask your care team if you should be screened for diabetes.  Cholesterol test: At age 39, start having a cholesterol test every 5 years, or more often if advised.  Bone density scan (DEXA): At age 50, ask your care team if you should have this scan for osteoporosis (brittle bones).  Hepatitis C: Get tested at least once in your life.  STIs (sexually  transmitted infections)  Before age 24: Ask your care team if you should be screened for STIs.  After age 24: Get screened for STIs if you're at risk. You are at risk for STIs (including HIV) if:  You are sexually active with more than one person.  You don't use condoms every time.  You or a partner was diagnosed with a sexually transmitted infection.  If you are at risk for HIV, ask about PrEP medicine to prevent HIV.  Get tested for HIV at least once in your life, whether you are at risk for HIV or not.  Cancer screening tests  Cervical cancer screening: If you have a cervix, begin getting regular cervical cancer screening tests starting at age 21.  Breast cancer scan (mammogram): If you've ever had breasts, begin having regular mammograms starting at age 40. This is a scan to check for breast cancer.  Colon cancer screening: It is important to start screening for colon cancer at age 45.  Have a colonoscopy test every 10 years (or more often if you're at risk) Or, ask your provider about stool tests like a FIT test every year or Cologuard test every 3 years.  To learn more about your testing options, visit:   .  For help making a decision, visit:   https://bit.ly/lx54842.  Prostate cancer screening test: If you have a prostate, ask your care team if a prostate cancer screening test (PSA) at age 55 is right for you.  Lung cancer screening: If you are a current or former smoker ages 50 to 80, ask your care team if ongoing lung cancer screenings are right for you.  For informational purposes only. Not to replace the advice of your health care provider. Copyright   2023 [employer]. All rights reserved. Clinically reviewed by the [facility]. [employer] [id number] - REV 01/24.

## 2024-09-23 LAB
HPV HR 12 DNA CVX QL NAA+PROBE: NEGATIVE
HPV16 DNA CVX QL NAA+PROBE: NEGATIVE
HPV18 DNA CVX QL NAA+PROBE: NEGATIVE
HUMAN PAPILLOMA VIRUS FINAL DIAGNOSIS: NORMAL

## 2024-09-25 LAB
BKR AP ASSOCIATED HPV REPORT: NORMAL
BKR LAB AP GYN ADEQUACY: NORMAL
BKR LAB AP GYN INTERPRETATION: NORMAL
BKR LAB AP PREVIOUS ABNORMAL: NORMAL
PATH REPORT.COMMENTS IMP SPEC: NORMAL
PATH REPORT.COMMENTS IMP SPEC: NORMAL
PATH REPORT.RELEVANT HX SPEC: NORMAL

## 2024-12-14 DIAGNOSIS — F41.9 ANXIETY: ICD-10-CM

## 2024-12-16 RX ORDER — ESCITALOPRAM OXALATE 10 MG/1
10 TABLET ORAL DAILY
Qty: 90 TABLET | Refills: 0 | Status: SHIPPED | OUTPATIENT
Start: 2024-12-16

## 2025-01-27 ENCOUNTER — OFFICE VISIT (OUTPATIENT)
Dept: URGENT CARE | Facility: CLINIC | Age: 40
End: 2025-01-27
Payer: COMMERCIAL

## 2025-01-27 VITALS
HEIGHT: 66 IN | TEMPERATURE: 98 F | OXYGEN SATURATION: 97 % | HEART RATE: 89 BPM | SYSTOLIC BLOOD PRESSURE: 117 MMHG | RESPIRATION RATE: 20 BRPM | DIASTOLIC BLOOD PRESSURE: 82 MMHG | BODY MASS INDEX: 36.84 KG/M2 | WEIGHT: 229.25 LBS

## 2025-01-27 DIAGNOSIS — B96.89 ACUTE BACTERIAL SINUSITIS: Primary | ICD-10-CM

## 2025-01-27 DIAGNOSIS — J34.89 NASAL CONGESTION WITH RHINORRHEA: ICD-10-CM

## 2025-01-27 DIAGNOSIS — L21.9 SEBORRHEIC DERMATITIS OF SCALP: ICD-10-CM

## 2025-01-27 DIAGNOSIS — R05.8 OTHER COUGH: ICD-10-CM

## 2025-01-27 DIAGNOSIS — R09.81 NASAL CONGESTION WITH RHINORRHEA: ICD-10-CM

## 2025-01-27 DIAGNOSIS — R06.02 SHORTNESS OF BREATH: ICD-10-CM

## 2025-01-27 DIAGNOSIS — J01.90 ACUTE BACTERIAL SINUSITIS: Primary | ICD-10-CM

## 2025-01-27 PROCEDURE — 96372 THER/PROPH/DIAG INJ SC/IM: CPT | Mod: S$GLB,,, | Performed by: PHYSICIAN ASSISTANT

## 2025-01-27 PROCEDURE — 99213 OFFICE O/P EST LOW 20 MIN: CPT | Mod: 25,S$GLB,, | Performed by: PHYSICIAN ASSISTANT

## 2025-01-27 RX ORDER — INHALER, ASSIST DEVICES
SPACER (EA) MISCELLANEOUS
Qty: 1 EACH | Refills: 0 | Status: SHIPPED | OUTPATIENT
Start: 2025-01-27

## 2025-01-27 RX ORDER — KETOCONAZOLE 20 MG/ML
SHAMPOO, SUSPENSION TOPICAL
Qty: 120 ML | Refills: 0 | Status: SHIPPED | OUTPATIENT
Start: 2025-01-27

## 2025-01-27 RX ORDER — DEXAMETHASONE SODIUM PHOSPHATE 10 MG/ML
10 INJECTION INTRAMUSCULAR; INTRAVENOUS ONCE
Status: COMPLETED | OUTPATIENT
Start: 2025-01-27 | End: 2025-01-27

## 2025-01-27 RX ORDER — BROMPHENIRAMINE MALEATE, PSEUDOEPHEDRINE HYDROCHLORIDE, AND DEXTROMETHORPHAN HYDROBROMIDE 2; 30; 10 MG/5ML; MG/5ML; MG/5ML
10 SYRUP ORAL EVERY 4 HOURS PRN
Qty: 200 ML | Refills: 0 | Status: SHIPPED | OUTPATIENT
Start: 2025-01-27 | End: 2025-02-10

## 2025-01-27 RX ORDER — ALBUTEROL SULFATE 90 UG/1
1-2 INHALANT RESPIRATORY (INHALATION) EVERY 4 HOURS PRN
Qty: 18 G | Refills: 0 | Status: SHIPPED | OUTPATIENT
Start: 2025-01-27 | End: 2025-02-26

## 2025-01-27 RX ORDER — CLOBETASOL PROPIONATE 0.5 MG/G
AEROSOL, FOAM TOPICAL
Qty: 50 G | Refills: 0 | Status: SHIPPED | OUTPATIENT
Start: 2025-01-27

## 2025-01-27 RX ORDER — AMOXICILLIN AND CLAVULANATE POTASSIUM 875; 125 MG/1; MG/1
1 TABLET, FILM COATED ORAL 2 TIMES DAILY
Qty: 20 TABLET | Refills: 0 | Status: SHIPPED | OUTPATIENT
Start: 2025-01-27 | End: 2025-02-06

## 2025-01-27 RX ADMIN — DEXAMETHASONE SODIUM PHOSPHATE 10 MG: 10 INJECTION INTRAMUSCULAR; INTRAVENOUS at 05:01

## 2025-01-27 NOTE — PATIENT INSTRUCTIONS
Recommend oral antihistamine (loratadine [Claritin]/cetrizine [Zyrtec]) +/- oral decongestant (pseudoephedrine) for rhinorrhea, steroid nasal spray (flonase), prescription/OTC cough medicine [brompheniramine-pseudoeph-DM (BROMFED DM) ] as needed during day before 8 pm,  Nyquil at night, Tylenol (Acetaminophen) and/or Motrin (Ibuprofen) as directed for control of pain and/or fever. Mucinex Dm for wet cough   Bromphed contains pseudoephedrine so please do not take a separate oral decongestant (sudafed/pseudoephedrine) or stimulant (adderall/vyvanse) for ADHD.   Take Dayquil or Bromphed. Dayquil contains phenylephrine + Bromphed has pseudoephedrine - taking it together can cause elevated blood pressure.      Please drink plenty of fluids.  Please get plenty of rest.  Nasal irrigation with a saline spray or Netti Pot like device per their directions is also recommended.  If you  smoke, please stop smoking.    To help ease a sore throat, you can:  Use a sore throat spray.  Suck on hard candy or throat lozenges.  Gargle with warm saltwater a few times each day. Mix of 1/4 teaspoon (1.25 grams) salt in 8 ounces (240 mL) of warm water.  Use a cool mist humidifier to help you breathe easier.    If you negative (-) for a COVID test today and you are continuing to have symptoms, it is recommended to repeat the test in 48 hours x 3. If you continue to be negative, you may return to school/work once you have improved symptoms and no fever for 24 hours without any medications. This applies to all viral illnesses.       Discussed prescriptions and over-the-counter medicines to help with patient's symptoms:  A steroid nose spray (flonase) and antihistamine nasal spray (azelastine) can help with a stuffy nose. It can also help with drainage down the back of your throat.  An antihistamine (loratadine,zyrtec,allegra, xyzal) can help with itching, sneezing, or runny nose.  An antihistamine eye drop can help with itchy eyes.  A  decongestant (pseudoephedrine,  Phenylephrine, oxymetazoline aka afrin nasal spray) can help with a stuffy nose. Take <10 days for congestion and rhinorrhea. Once symptoms improve, proceed with loratadine/zyrtec once a day. These ingredients can keep you up all night, decrease appetite, feel jittery, and raise blood pressure with long term use.  OTC Coricidin can be used for patients with hypertension and palpitations because you cannot use ingredients such as pseudoephedrine and phenylephrine for oral decongestants.  Medications that control cough are suppressants and expectorants. Suppressants are tessalon pearls and dextromethorphan. If you have a productive cough with sputum, you need an expectorant called guaifenesin. Dextromethorphan and Guaifenesin are active ingredients in many OTC cough/cold medications such as Dayquil/Nyquil, Mucinex, and Robitussin Mucus+Chest Congestion.            Common Cold Medicine Ingredients Cheat sheet  Acetaminophen (APAP) -pain reliever/fever reducer  Dextromethorphan - cough suppressant  Guaifenesin - expectorant/thins and loosens mucus  Phenylephrine - nasal decongestant  Diphenhydramine or Doxylamine succinate - antihistamine, helps you fall asleep  Promethazine or Brompheniramine - Prescription strength antihistamines    These OTC cold medications are safe to use if you do not have high blood pressure (hypertension) or palpitations.  DayQuil and NyQuil - Cough, Cold & Flu Relief LiquiCaps  DayQuil: Acetaminophen, Dextromethorphan, and Phenylephrine   NyQuil: Acetaminophen, Dextromethorphan, and Doxylamine  DayQuil and NyQuil SEVERE Maximum Strength Cough, Cold & Flu Relief LiquiCaps  DAYQUIL: Acetaminophen, Dextromethorphan, Guaifenesin, and Phenylephrine  NYQUIL: Acetaminophen, Dextromethorphan, Doxylamine, and Phenylephrine   Mucinex DM: Guaifenesin,Dextromethorphan  Mucinex Maximum Strength Sinus-Max® Pressure, Pain & Cough Liquid Gels: Acetaminophen/Dextromethorphan/  Guaifenesin/Phenylephrine     If not allergic, take Tylenol (Acetaminophen) 650 mg to  1 g every 6 hours as needed  and/or Motrin (Ibuprofen) 600 to 800 mg every 6 hours as needed for fever or pain.    Discussed adverse side effects of recurrent/long term steroid use: elevated blood pressure elevated blood sugar, pancreatitis,glaucoma/cataracts, weight gain in face/abdomen/neck, round face (moon face), fluid retention in legs/lungs, mood swings, upset stomach, increased risk of infections, osteoporosis and increased risk of fractures, fatigue, loss of appetite, nausea and muscle weakness, thin skin, bruising and slower wound healing.        Please remember that you have received care at an urgent care today. Urgent cares are not emergency rooms and are not equipped to handle life threatening emergencies and cannot rule in or out certain medical conditions and you may be released before all of your medical problems are known or treated.     Please arrange follow up with your primary care physician or speciality clinic within 2-5 days if your signs and symptoms have not resolved or worsen.     Patient can call our Referral Hotline at [phone] to make an appointment.      Please return here or go to the Emergency Department for any concerns or worsening of condition.  Signs of infection. These include a fever of 100.4°F (38°C) or higher, chills, cough, more sputum or change in color of sputum.  You are having so much trouble breathing that you can only say one or two words at a time.  You need to sit upright at all times to be able to breathe and or cannot lie down.  You have trouble breathing when talking or sitting still.  You have a fever of 100.4°F (38°C) or higher or chills.  You have chest pain when you cough, have trouble breathing but can still talk in full sentences, or cough up blood.

## 2025-01-27 NOTE — PROGRESS NOTES
"Subjective:      Patient ID: Anette Villa is a 39 y.o. female.    Vitals:  height is 5' 6" (1.676 m) and weight is 104 kg (229 lb 4.5 oz). Her oral temperature is 98.2 °F (36.8 °C). Her blood pressure is 117/82 and her pulse is 89. Her respiration is 20 and oxygen saturation is 97%.     Chief Complaint: Cough    Anette Villa is a 39 y.o. female with hx of allergic rhinitis who complains of  cough, congestion, runny nose, sneezing, headaches, body aches. Sx started 1 week ago. Tx include OTC cold and flu, benzonatate, promethazine with no relief.  She is requesting for albuterol refill.    Cough  This is a recurrent problem. The current episode started 1 to 4 weeks ago. The problem has been gradually worsening. The problem occurs constantly. The cough is Non-productive. Associated symptoms include headaches, myalgias, nasal congestion, postnasal drip, a rash and rhinorrhea. Pertinent negatives include no chest pain, chills, ear congestion, ear pain, fever, heartburn, hemoptysis, sore throat, shortness of breath, sweats, weight loss or wheezing. Nothing aggravates the symptoms. She has tried OTC cough suppressant and prescription cough suppressant for the symptoms. The treatment provided mild relief. There is no history of asthma, bronchiectasis, bronchitis, COPD, emphysema, environmental allergies or pneumonia.     Constitution: Positive for fatigue. Negative for chills and fever.   HENT:  Positive for congestion, postnasal drip, sinus pain and sinus pressure. Negative for ear pain, ear discharge, foreign body in ear, tinnitus, sore throat, trouble swallowing and voice change.    Cardiovascular:  Negative for chest pain, leg swelling, palpitations and sob on exertion.   Respiratory:  Positive for cough and sputum production. Negative for bloody sputum, shortness of breath, wheezing and asthma.    Gastrointestinal:  Negative for nausea, vomiting and heartburn.   Musculoskeletal:  Positive for muscle ache. "   Skin:  Positive for rash and erythema.   Allergic/Immunologic: Negative for environmental allergies, seasonal allergies, food allergies and asthma.   Neurological:  Positive for headaches.   Psychiatric/Behavioral:  Negative for nervous/anxious. The patient is not nervous/anxious.       Objective:     Physical Exam   Constitutional: She is oriented to person, place, and time. She is cooperative. No distress.      Comments:Patient is awake and alert, sitting up in exam chair, speaking and answering in complete sentences     normalawake  HENT:   Head: Normocephalic and atraumatic.      Comments: Patient observed breathing through mouth, sniffling, and frequently clearing throat.Tenderness to frontal sinuses and maxillary sinuses; pain elicited to areas when patient lean forward      Ears:   Right Ear: External ear and ear canal normal. A middle ear effusion is present.   Left Ear: External ear and ear canal normal. A middle ear effusion is present.   Nose: Mucosal edema, rhinorrhea, sinus tenderness and congestion present. Right sinus exhibits maxillary sinus tenderness and frontal sinus tenderness. Left sinus exhibits maxillary sinus tenderness and frontal sinus tenderness.   Mouth/Throat: Uvula is midline, oropharynx is clear and moist and mucous membranes are normal. Mucous membranes are moist. No oropharyngeal exudate or posterior oropharyngeal erythema. Tonsils are 0 on the right. Tonsils are 0 on the left. No tonsillar exudate. Oropharynx is clear.      Comments:  postnasal discharge noted on the posterior pharyngeal wall    Eyes: Conjunctivae and lids are normal. Pupils are equal, round, and reactive to light. Lids are everted and swept, no foreign bodies found. Extraocular movement intact vision grossly intact gaze aligned appropriately   Neck: Neck supple.   Cardiovascular: Normal rate, regular rhythm, normal heart sounds and normal pulses.   Pulmonary/Chest: Effort normal and breath sounds normal. No  respiratory distress. She has no wheezes. She has no rhonchi. She has no rales.   Abdominal: Normal appearance.   Musculoskeletal: Normal range of motion.         General: Normal range of motion.      Cervical back: She exhibits no tenderness.   Lymphadenopathy:     She has no cervical adenopathy.   Neurological: She is alert and oriented to person, place, and time.   Skin: Skin is warm. erythema         Comments: Diffuse, fine scaliness of the scalp with underlying erythema.      Psychiatric: Her behavior is normal. Mood, judgment and thought content normal.   Nursing note and vitals reviewed.      Assessment:     1. Acute bacterial sinusitis    2. Nasal congestion with rhinorrhea    3. Other cough    4. Shortness of breath    5. Seborrheic dermatitis of scalp      Patient presents with clinical exam findings and history consistent with above.      On exam, patient is nontoxic appearing and vitals are stable.      Diagnostic testing results were reviewed and discussed with patient/guardian.   Tests ordered in clinic: None  Previous progress notes/admissions/labs and medications were reviewed.      Plan:       Acute bacterial sinusitis  -     dexAMETHasone injection 10 mg  -     amoxicillin-clavulanate 875-125mg (AUGMENTIN) 875-125 mg per tablet; Take 1 tablet by mouth 2 (two) times daily. for 10 days  Dispense: 20 tablet; Refill: 0  -     Ambulatory referral/consult to Internal Medicine    Nasal congestion with rhinorrhea  -     brompheniramine-pseudoeph-DM (BROMFED DM) 2-30-10 mg/5 mL Syrp; Take 10 mLs by mouth every 4 (four) hours as needed (cough, cold, and congestion).  Dispense: 200 mL; Refill: 0    Other cough  -     brompheniramine-pseudoeph-DM (BROMFED DM) 2-30-10 mg/5 mL Syrp; Take 10 mLs by mouth every 4 (four) hours as needed (cough, cold, and congestion).  Dispense: 200 mL; Refill: 0    Shortness of breath  -     dexAMETHasone injection 10 mg  -     albuterol (PROVENTIL/VENTOLIN HFA) 90 mcg/actuation  "inhaler; Inhale 1-2 puffs into the lungs every 4 (four) hours as needed for Wheezing or Shortness of Breath.  Dispense: 18 g; Refill: 0  -     inhalation spacing device (OPTICHAMBER SARIAH [facility] Use as directed for inhalation.  Dispense: 1 each; Refill: 0    Seborrheic dermatitis of scalp  -     ketoconazole (NIZORAL) 2 % shampoo; Lather and apply topically to face/scalp/ears. Wash off after 3-5 minutes. Repeat twice a week for seborrheic dermatitis.  Dispense: 120 mL; Refill: 0  -     clobetasoL (OLUX) 0.05 % Foam; Apply 1 gm on the skin everyday or twice a day to scalp  Dispense: 50 g; Refill: 0                    1) See orders for this visit as documented in the electronic medical record.  2) Symptomatic therapy suggested: use acetaminophen/ibuprofen every 6-8 hours prn pain or fever, push fluids.   3) Call or return to clinic prn if these symptoms worsen or fail to improve as anticipated.    Discussed results/diagnosis/plan with patient in clinic.  We had shared decision making for patient's treatment. Patient verbalized understanding and in agreement with current treatment plan.     Patient was instructed to return for re-evaluation with urgent care or PCP for continued outpatient workup and management if symptoms do not improve/worsening symptoms. Strict ED versus clinic precautions given in depth.    Discharge and follow-up instructions given verbally/printed with the patient who expressed understanding. The instructions and results are also [facility] on [facility].              Riddhi "Kristine" BENTON Gottlieb          Patient Instructions   Recommend oral antihistamine (loratadine [Claritin]/cetrizine [Zyrtec]) +/- oral decongestant (pseudoephedrine) for rhinorrhea, steroid nasal spray (flonase), prescription/OTC cough medicine [brompheniramine-pseudoeph-DM (BROMFED DM) ] as needed during day before 8 pm,  Nyquil at night, Tylenol (Acetaminophen) and/or Motrin (Ibuprofen) as directed for control of pain and/or " fever. Mucinex Dm for wet cough   Bromphed contains pseudoephedrine so please do not take a separate oral decongestant (sudafed/pseudoephedrine) or stimulant (adderall/vyvanse) for ADHD.   Take Dayquil or Bromphed. Dayquil contains phenylephrine + Bromphed has pseudoephedrine - taking it together can cause elevated blood pressure.      Please drink plenty of fluids.  Please get plenty of rest.  Nasal irrigation with a saline spray or Netti Pot like device per their directions is also recommended.  If you  smoke, please stop smoking.    To help ease a sore throat, you can:  Use a sore throat spray.  Suck on hard candy or throat lozenges.  Gargle with warm saltwater a few times each day. Mix of 1/4 teaspoon (1.25 grams) salt in 8 ounces (240 mL) of warm water.  Use a cool mist humidifier to help you breathe easier.    If you negative (-) for a COVID test today and you are continuing to have symptoms, it is recommended to repeat the test in 48 hours x 3. If you continue to be negative, you may return to school/work once you have improved symptoms and no fever for 24 hours without any medications. This applies to all viral illnesses.       Discussed prescriptions and over-the-counter medicines to help with patient's symptoms:  A steroid nose spray (flonase) and antihistamine nasal spray (azelastine) can help with a stuffy nose. It can also help with drainage down the back of your throat.  An antihistamine (loratadine,zyrtec,allegra, xyzal) can help with itching, sneezing, or runny nose.  An antihistamine eye drop can help with itchy eyes.  A decongestant (pseudoephedrine,  Phenylephrine, oxymetazoline aka afrin nasal spray) can help with a stuffy nose. Take <10 days for congestion and rhinorrhea. Once symptoms improve, proceed with loratadine/zyrtec once a day. These ingredients can keep you up all night, decrease appetite, feel jittery, and raise blood pressure with long term use.  OTC Coricidin can be used for  patients with hypertension and palpitations because you cannot use ingredients such as pseudoephedrine and phenylephrine for oral decongestants.  Medications that control cough are suppressants and expectorants. Suppressants are tessalon pearls and dextromethorphan. If you have a productive cough with sputum, you need an expectorant called guaifenesin. Dextromethorphan and Guaifenesin are active ingredients in many OTC cough/cold medications such as Dayquil/Nyquil, Mucinex, and Robitussin Mucus+Chest Congestion.            Common Cold Medicine Ingredients Cheat sheet  Acetaminophen (APAP) -pain reliever/fever reducer  Dextromethorphan - cough suppressant  Guaifenesin - expectorant/thins and loosens mucus  Phenylephrine - nasal decongestant  Diphenhydramine or Doxylamine succinate - antihistamine, helps you fall asleep  Promethazine or Brompheniramine - Prescription strength antihistamines    These OTC cold medications are safe to use if you do not have high blood pressure (hypertension) or palpitations.  DayQuil and NyQuil - Cough, Cold & Flu Relief LiquiCaps  DayQuil: Acetaminophen, Dextromethorphan, and Phenylephrine   NyQuil: Acetaminophen, Dextromethorphan, and Doxylamine  DayQuil and NyQuil SEVERE Maximum Strength Cough, Cold & Flu Relief LiquiCaps  DAYQUIL: Acetaminophen, Dextromethorphan, Guaifenesin, and Phenylephrine  NYQUIL: Acetaminophen, Dextromethorphan, Doxylamine, and Phenylephrine   Mucinex DM: Guaifenesin,Dextromethorphan  Mucinex Maximum Strength Sinus-Max® Pressure, Pain & Cough Liquid Gels: Acetaminophen/Dextromethorphan/ Guaifenesin/Phenylephrine     If not allergic, take Tylenol (Acetaminophen) 650 mg to  1 g every 6 hours as needed  and/or Motrin (Ibuprofen) 600 to 800 mg every 6 hours as needed for fever or pain.    Discussed adverse side effects of recurrent/long term steroid use: elevated blood pressure elevated blood sugar, pancreatitis,glaucoma/cataracts, weight gain in face/abdomen/neck,  round face (moon face), fluid retention in legs/lungs, mood swings, upset stomach, increased risk of infections, osteoporosis and increased risk of fractures, fatigue, loss of appetite, nausea and muscle weakness, thin skin, bruising and slower wound healing.        Please remember that you have received care at an urgent care today. Urgent cares are not emergency rooms and are not equipped to handle life threatening emergencies and cannot rule in or out certain medical conditions and you may be released before all of your medical problems are known or treated.     Please arrange follow up with your primary care physician or speciality clinic within 2-5 days if your signs and symptoms have not resolved or worsen.     Patient can call our Referral Hotline at [phone] to make an appointment.      Please return here or go to the Emergency Department for any concerns or worsening of condition.  Signs of infection. These include a fever of 100.4°F (38°C) or higher, chills, cough, more sputum or change in color of sputum.  You are having so much trouble breathing that you can only say one or two words at a time.  You need to sit upright at all times to be able to breathe and or cannot lie down.  You have trouble breathing when talking or sitting still.  You have a fever of 100.4°F (38°C) or higher or chills.  You have chest pain when you cough, have trouble breathing but can still talk in full sentences, or cough up blood.

## 2025-01-27 NOTE — LETTER
"     January 27, 2025      [facility] - [address]  [place] [address]  Phone: [phone]  Fax: [phone]       Patient: Anette Villa   YOB: 1985  Date of Visit: 01/27/2025    To Whom It May Concern:    Rio Villa  was at [facility] on 01/27/2025. The patient may return to work/school on 1/28/25 with no restrictions. If you have any questions or concerns, or if I can be of further assistance, please do not hesitate to contact me.    Sincerely,        Riddhi Gottlieb PA-C (Jackie)     "

## 2025-02-18 ENCOUNTER — TELEPHONE (OUTPATIENT)
Dept: FAMILY MEDICINE | Facility: CLINIC | Age: 40
End: 2025-02-18

## 2025-02-18 ENCOUNTER — OFFICE VISIT (OUTPATIENT)
Dept: FAMILY MEDICINE | Facility: CLINIC | Age: 40
End: 2025-02-18
Payer: COMMERCIAL

## 2025-02-18 VITALS
SYSTOLIC BLOOD PRESSURE: 122 MMHG | DIASTOLIC BLOOD PRESSURE: 84 MMHG | OXYGEN SATURATION: 96 % | BODY MASS INDEX: 38.09 KG/M2 | WEIGHT: 237 LBS | HEIGHT: 66 IN | TEMPERATURE: 99 F | HEART RATE: 101 BPM

## 2025-02-18 DIAGNOSIS — J40 BRONCHITIS: Primary | ICD-10-CM

## 2025-02-18 DIAGNOSIS — R05.9 COUGH, UNSPECIFIED TYPE: ICD-10-CM

## 2025-02-18 DIAGNOSIS — R09.81 NASAL CONGESTION: ICD-10-CM

## 2025-02-18 LAB
CTP QC/QA: YES
SARS-COV-2 RDRP RESP QL NAA+PROBE: NEGATIVE

## 2025-02-18 RX ORDER — PREDNISONE 20 MG/1
20 TABLET ORAL DAILY
Qty: 3 TABLET | Refills: 0 | Status: SHIPPED | OUTPATIENT
Start: 2025-02-18 | End: 2025-02-21

## 2025-02-18 RX ORDER — PROMETHAZINE HYDROCHLORIDE AND DEXTROMETHORPHAN HYDROBROMIDE 6.25; 15 MG/5ML; MG/5ML
5 SYRUP ORAL EVERY 8 HOURS PRN
Qty: 180 ML | Refills: 1 | Status: SHIPPED | OUTPATIENT
Start: 2025-02-18

## 2025-02-18 RX ORDER — DOXYCYCLINE 100 MG/1
100 CAPSULE ORAL 2 TIMES DAILY
Qty: 10 CAPSULE | Refills: 0 | Status: SHIPPED | OUTPATIENT
Start: 2025-02-18 | End: 2025-02-23

## 2025-02-18 NOTE — PATIENT INSTRUCTIONS
Patient Instructions   Recommend oral antihistamine (claritin, zyrtec, allegra,xyzal)) +/- oral decongestant (pseudoephedrine)  for rhinorrhea/ear congestion for 3 to 5 days if blood pressure is <130/80mmhg, steroid nasal spray (flonase) +/- , prescription (promethazine-DM) at night due to drowsiness  /OTC cough medicine (theraflu or Mucinex Dm 12 hour or Coricidin HBP® Maximum Strength Cold & Flu Day),  Tylenol (Acetaminophen) and/or Motrin (Ibuprofen) as directed for control of pain and/or fever.        Please drink plenty of fluids.  Please get plenty of rest.  Nasal irrigation with a saline spray or Netti Pot like device per their directions is also recommended.  If you  smoke, please stop smoking.     To help ease a sore throat, you can:  Use a sore throat spray.  Suck on hard candy or throat lozenges.  Gargle with warm saltwater a few times each day. Mix of 1/4 teaspoon (1.25 grams) salt in 8 ounces (240 mL) of warm water.  Use a cool mist humidifier to help you breathe easier.     If you negative (-) for a COVID test today and you are continuing to have symptoms, it is recommended to repeat the test in 48 hours x 3. If you continue to be negative, you may return to school/work once you have improved symptoms and no fever for 24 hours without any medications. This applies to all viral illnesses.         Discussed prescriptions and over-the-counter medicines to help with patient's symptoms:  A steroid nose spray (flonase) and antihistamine nasal spray (azelastine) can help with a stuffy nose. It can also help with drainage down the back of your throat.  An antihistamine (loratadine,zyrtec,allegra, xyzal) can help with itching, sneezing, or runny nose.  An antihistamine eye drop can help with itchy eyes.  A decongestant (pseudoephedrine,  Phenylephrine, oxymetazoline aka afrin nasal spray) can help with a stuffy nose. Take <10 days for congestion and rhinorrhea. Once symptoms improve, proceed with  loratadine/zyrtec once a day. These ingredients can keep you up all night, decrease appetite, feel jittery, and raise blood pressure with long term use.  OTC Coricidin can be used for patients with hypertension and palpitations because you cannot use ingredients such as pseudoephedrine and phenylephrine for oral decongestants.  Coricidin HBP Cough & Cold (Chlorpheniramine/Dextromethorphan)  Coricidin HBP Maximum Strength Multi-Symptom Flu  (Acetaminophen,Dextromethorphan, Chlorpheniramine)  Coricidin HBP® Maximum Strength Cold & Flu Day/Night (Acetaminophen,Dextromethorphan, Doxylamine, Guaifenesin)  Coricidin HBP Chest Congestion & Cough (Dextromethorphan + Guaifenesin)  Mucinex DM: Guaifenesin,Dextromethorphan     Medications that control cough are suppressants and expectorants. Suppressants are tessalon pearls and dextromethorphan. If you have a productive cough with sputum, you need an expectorant called guaifenesin. Dextromethorphan and Guaifenesin are active ingredients in many OTC cough/cold medications such as Dayquil/Nyquil, Mucinex, and Robitussin Mucus+Chest Congestion.             Common Cold Medicine Ingredients Cheat sheet  Acetaminophen (APAP) -pain reliever/fever reducer  Dextromethorphan - cough suppressant  Guaifenesin - expectorant/thins and loosens mucus  Phenylephrine - nasal decongestant  Diphenhydramine or Doxylamine succinate - antihistamine, helps you fall asleep  Promethazine or Brompheniramine - Prescription strength antihistamines     If not allergic, take Tylenol (Acetaminophen) 650 mg to  1 g every 6 hours as needed  and/or Motrin (Ibuprofen) 600 to 800 mg every 6 hours as needed for fever or pain.     If your blood pressure was elevated during your visit and this was discussed with you, please obtain a home blood pressure cuff and take your blood pressure once daily for two weeks, record values and follow up with your PCP. If you were started on blood pressure medication today, ensure  you obtain a follow up appointment with your PCP in 5-7 days for a re-check, if you develop shortness of breath, severe headache, weakness, chest pain, vision problems after leaving urgent care, call 911 and go to the ER immediately.
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
## 2025-04-06 DIAGNOSIS — E66.811 CLASS 1 OBESITY WITHOUT SERIOUS COMORBIDITY WITH BODY MASS INDEX (BMI) OF 33.0 TO 33.9 IN ADULT, UNSPECIFIED OBESITY TYPE: ICD-10-CM

## 2025-04-07 RX ORDER — PHENTERMINE HYDROCHLORIDE 37.5 MG/1
37.5 CAPSULE ORAL EVERY MORNING
Qty: 30 CAPSULE | Refills: 5 | Status: SHIPPED | OUTPATIENT
Start: 2025-04-07

## 2025-04-08 NOTE — PROGRESS NOTES
Patient ID: Anette Villa is a 39 y.o. White female    Pt was late to appointment and was not seen for her intake appointment. C will reach out to pt to reschedule.

## 2025-04-09 ENCOUNTER — OFFICE VISIT (OUTPATIENT)
Dept: INTERNAL MEDICINE | Facility: CLINIC | Age: 40
End: 2025-04-09
Payer: COMMERCIAL

## 2025-04-09 DIAGNOSIS — E66.812 OBESITY, CLASS II, BMI 35-39.9: Primary | ICD-10-CM

## 2025-04-09 PROCEDURE — 99499 UNLISTED E&M SERVICE: CPT | Mod: 95,,,

## 2025-04-14 ENCOUNTER — PATIENT MESSAGE (OUTPATIENT)
Dept: INTERNAL MEDICINE | Facility: CLINIC | Age: 40
End: 2025-04-14
Payer: COMMERCIAL

## 2025-04-23 NOTE — PROGRESS NOTES
Patient ID: Anette Villa is a 39 y.o. White female    Subjective  Chief Complaint: patient presents for medical weight loss management.    Contraindications to GLP-1 receptor agonist therapy:   Denies personal or family history of MTC, personal history of MEN2, history of allergic reaction while taking a GLP-1 receptor agonist, and history of pancreatitis while taking a GLP-1 receptor agonist     Pregnancy Status:   - Pt denies current pregnancy, breastfeeding, or plans to become pregnant.  - Pt denies current use of oral hormonal contraception.     Co-morbidities: HTN    History of weight loss therapy:  Pt reports therapy with compounded tirzepatide and is currently taking 7.5 mg. Pt has been taking tirzepatide for about a year and reports weight loss of about 100 lbs. Pt states that she has been spacing her doses every few weeks to conserve her medication but has since gained some of her weight back. Her last dose was a week and has a couple doses left.    Tolerance to current therapy:  Denies nausea, vomiting, diarrhea, constipation, abdominal pain    Weight loss history:  Starting weight: 300 lbs - pt reported  Current weight:    4/24/2025   Recent Readings    Weight (lbs) 238 lb    BMI 40.85 BMI      % weight loss since GLP-1 initiation: 20.6 %    Objective  Lab Results   Component Value Date     07/06/2023     08/06/2019     06/07/2017     Lab Results   Component Value Date    K 4.3 07/06/2023    K 4.4 08/06/2019    K 4.1 06/07/2017     Lab Results   Component Value Date     07/06/2023     08/06/2019     06/07/2017     Lab Results   Component Value Date    CO2 23 07/06/2023    CO2 24 08/06/2019    CO2 23 06/07/2017     Lab Results   Component Value Date    BUN 14 07/06/2023    BUN 16 08/06/2019    BUN 18 06/07/2017     Lab Results   Component Value Date    GLU 90 07/06/2023    GLU 89 08/06/2019    GLU 82 06/07/2017     Lab Results   Component Value Date    CALCIUM 9.7  07/06/2023    CALCIUM 10.0 08/06/2019    CALCIUM 9.4 06/07/2017     Lab Results   Component Value Date    PROT 7.6 07/06/2023    PROT 7.8 08/06/2019    PROT 7.9 06/07/2017     Lab Results   Component Value Date    ALBUMIN 4.2 07/06/2023    ALBUMIN 4.3 08/06/2019    ALBUMIN 3.9 06/07/2017     Lab Results   Component Value Date    BILITOT 0.4 07/06/2023    BILITOT 0.3 08/06/2019    BILITOT 0.3 06/07/2017     Lab Results   Component Value Date    AST 16 07/06/2023    AST 21 08/06/2019    AST 18 06/07/2017     Lab Results   Component Value Date    ALT 15 07/06/2023    ALT 20 08/06/2019    ALT 19 06/07/2017     Lab Results   Component Value Date    ANIONGAP 10 07/06/2023    ANIONGAP 9 08/06/2019    ANIONGAP 10 06/07/2017     Lab Results   Component Value Date    CREATININE 0.8 07/06/2023    CREATININE 0.8 08/06/2019    CREATININE 0.8 06/07/2017     Lab Results   Component Value Date    EGFRNORACEVR >60 07/06/2023     Assessment/Plan  -Continuation of GLP-1 RA therapy  -Continue Zepbound 7.5 mg subq weekly.  - RTC in 3 months for follow-up evaluation    Patient consented to pharmacist management via collaborative practice.

## 2025-04-24 ENCOUNTER — PATIENT MESSAGE (OUTPATIENT)
Dept: INTERNAL MEDICINE | Facility: CLINIC | Age: 40
End: 2025-04-24

## 2025-04-24 ENCOUNTER — OFFICE VISIT (OUTPATIENT)
Dept: INTERNAL MEDICINE | Facility: CLINIC | Age: 40
End: 2025-04-24
Payer: COMMERCIAL

## 2025-04-24 DIAGNOSIS — E66.01 OBESITY, CLASS III, BMI 40-49.9 (MORBID OBESITY): Primary | ICD-10-CM

## 2025-04-24 RX ORDER — TIRZEPATIDE 7.5 MG/.5ML
7.5 INJECTION, SOLUTION SUBCUTANEOUS
Qty: 2 ML | Refills: 2 | Status: ACTIVE | OUTPATIENT
Start: 2025-04-24

## 2025-05-26 DIAGNOSIS — K76.0 NAFLD (NONALCOHOLIC FATTY LIVER DISEASE): ICD-10-CM

## 2025-05-26 DIAGNOSIS — I10 PRIMARY HYPERTENSION: ICD-10-CM

## 2025-05-26 DIAGNOSIS — E66.01 MORBID OBESITY WITH BMI OF 40.0-44.9, ADULT: ICD-10-CM

## 2025-05-26 DIAGNOSIS — Z00.00 ROUTINE GENERAL MEDICAL EXAMINATION AT HEALTH CARE FACILITY: ICD-10-CM

## 2025-05-26 RX ORDER — AMLODIPINE BESYLATE 5 MG/1
5 TABLET ORAL
Qty: 90 TABLET | Refills: 0 | Status: SHIPPED | OUTPATIENT
Start: 2025-05-26

## 2025-05-26 NOTE — TELEPHONE ENCOUNTER
Provider Staff:  Action required for this patient    Requires appointment      Please see care gap opportunities below in Care Due Message.    Thanks!  [facility]     Appointments      Date Provider   Last Visit   5/1/2024 Heriberto Garcia MD   Next Visit   Visit date not found Heriberto Garcia MD     Refill Decision Note   Anette Villa  is requesting a refill authorization.  Brief Assessment and Rationale for Refill:  Approve     Medication Therapy Plan:        Comments:     Note composed:9:14 AM 05/26/2025

## 2025-05-26 NOTE — TELEPHONE ENCOUNTER
Care Due:                  Date            Visit Type   Department     Provider  --------------------------------------------------------------------------------                                MYCHART                              FOLLOWUP/OF  [facility] FAM[facility]  Last Visit: 05-      FICE VISIT   MEDICINE       Heriberto Garcia  Next Visit: None Scheduled  None         None Found                                                            Last  Test          Frequency    Reason                     Performed    Due Date  --------------------------------------------------------------------------------    Office Visit  15 months..  amLODIPine...............  05- 07-    [facility] Embedded Care Due Messages. Reference number: [id number].   5/26/2025 12:03:25 AM LOLA

## 2025-06-07 NOTE — PROGRESS NOTES
"                          Physical Therapy Daily Treatment Note     Name: Anette Villa  Clinic Number: [id number]    Therapy Diagnosis:   Encounter Diagnosis   Name Primary?    Decreased functional mobility Yes       Physician: Heriberto Garcia MD    Visit Date: 5/31/2023  Physician Orders: PT Eval and Treat   Medical Diagnosis from Referral:   M53.3 (ICD-10-CM) - Sacral back pain   M54.31,M54.32 (ICD-10-CM) - Bilateral sciatica      Evaluation Date: 4/24/2023  Authorization Period Expiration: 4/19/24  Plan of Care Expiration: 6/23/23  Visit # / Visits authorized: 3/ 30 + eval  FOTO: 4/10    Time In: 3:52 pm  Time Out: 4:50 pm  Total Billable Time: 58 minutes (2 MT, 2 TE)    Precautions: Standard    Subjective     Patient reports: she continues to feel better after therapy and needling but the pain comes back. States it feels like it has overall improved since starting.   She was compliant with home exercise program.  Response to previous treatment: reduced pain for a few days  Functional change: ongoing    Pain: 2/10  Location: midline low back/sacrum      Objective     Anette received therapeutic exercises to develop strength, endurance, ROM, flexibility, posture, and core stabilization for 30 minutes including:  Modified Piriformis 3 x 30"  Figure 4 stretch 3 x 30" NP  Walter pose 3 x 30"  Clamshells with RTB 30 x 3"  Supine hip adduction isometric 30 x 5"  Reverse clamshells with block  between knees 20 x 5"  Bridge 3x10     Anette received the following manual therapy techniques: Joint mobilizations, Manual traction, and Soft tissue Mobilization were applied to the sacrococcygeal joint for 25 minutes, including:  Functional dry needling to bilateral sacrum using 4 40mm needles bilaterally with electrical stimulation x 15 minutes. Utilized winding and periosteal pecking. Needles removed following without adverse response.    Anette received the following supervised modalities after being cleared for " "contradictions:   TENS electrical stimulation for pain to the sacrum. Electrical stimulation was applied to these needles at 4 Hz and a pulse of 250 µseconds for 15 minutes with a prison check. reported treatment well without any adverse effects.     Home Exercises Provided and Patient Education Provided:     Education provided:   - Continue HEP as directed by evaluating therapist    Written home exercises provided: Patient instructed to cont prior HEP.  Exercises were reviewed and Bao able to demonstrate them prior to the end of the session.  Anette demonstrated good  understanding of the education provided.     See electronic medical record under Notes-Patient Instructions for exercises provided prior visit.    Assessment     From al: "Anette is a 37 y.o. female referred to outpatient Physical Therapy with a medical diagnosis of sacral back pain. Pt presents with localized tenderness to lower sacrum/coccygeal region. No provocation with lumbar or hip testing and minimal range of motion deficits, mild strength deficits without pain. Pt with positive sacral thrust but negative remaining SI cluster testing. Pt with limited tolerance for sitting, transitional movements, standing and running. Following clearance of contraindications and receiving written and verbal consent, performed dry needling to SIJ with electrical stimulation for pain relief. Pt reported slight improvement following without adverse effects. May consider referral to pelvic floor therapy for internal assessment or additional pain management"    Patient continues to report good effects from dry needling and therapy but her pain returns a few days after treatment. Reported resolution of pain after needling this session. No reports of pain with any exercises. Scheduled pt out through remaining [facility], scheduling limited by her being on call some days. Noted 1/10 pain following therapy.    Anette Is progressing well towards her goals.   Patient " prognosis is Good.   Patient will continue to benefit from skilled outpatient physical therapy to address the deficits listed in the problem list box on initial evaluation, provide pt/family education and to maximize patient's level of independence in the home and community environment.     Pt's spiritual, cultural and educational needs considered and patient agreeable to plan of care and goals.  Anticipated barriers to physical therapy: None       Short Term Goals (2 Weeks):  1. Pt will be compliant with HEP to supplement PT in decreasing pain with functional mobility. (Progressing, not met)  2. Pt will perform sit to stand with good control to demonstrate improved core strength. (Progressing, not met)  3. Pt will report no pain with sitting with equal weightbearing for 15 minutes (Progressing, not met)     Long Term Goals (4 Weeks):   1. Pt will improve FOTO score to </= 21% limited to decrease perceived limitation with maintaining/changing body position.  (Progressing, not met)  2. Pt will report no tenderness to palpation of SIJ (Progressing, not met)  3. Pt will improve impaired LE MMTs to >/=5/5 to improve strength for functional tasks. (Progressing, not met)  4. Pt will report no pain during sitting during work shift to promote sitting tolerance.  (Progressing, not met)    Plan     Continue progressing BLE and low back mobility/stability exercises as tolerated, monitor response to DN    Plan of care Certification: 4/24/2023 to 6/23/23.     Outpatient Physical Therapy 2 times weekly for 8 weeks    Rustam Cardona, PT, DPT          Attending Only

## 2025-06-12 ENCOUNTER — PATIENT MESSAGE (OUTPATIENT)
Dept: INTERNAL MEDICINE | Facility: CLINIC | Age: 40
End: 2025-06-12
Payer: COMMERCIAL

## 2025-06-16 ENCOUNTER — TELEPHONE (OUTPATIENT)
Dept: INTERNAL MEDICINE | Facility: CLINIC | Age: 40
End: 2025-06-16
Payer: COMMERCIAL

## 2025-06-16 DIAGNOSIS — E66.812 OBESITY, CLASS II, BMI 35-39.9: Primary | ICD-10-CM

## 2025-06-16 RX ORDER — TIRZEPATIDE 10 MG/.5ML
10 INJECTION, SOLUTION SUBCUTANEOUS
Qty: 2 ML | Refills: 1 | Status: ACTIVE | OUTPATIENT
Start: 2025-06-16

## 2025-06-16 NOTE — TELEPHONE ENCOUNTER
Pt confirmed general tolerability with 7.5 mg. To avoid unnecessary delays in titration, sent order to [facility] for 10 mg. Will continue with scheduled follow-up.

## 2025-07-18 ENCOUNTER — PATIENT MESSAGE (OUTPATIENT)
Dept: ADMINISTRATIVE | Facility: OTHER | Age: 40
End: 2025-07-18
Payer: COMMERCIAL

## 2025-07-22 NOTE — PROGRESS NOTES
Patient ID: Anette Villa is a 39 y.o. White female    Subjective  Chief Complaint: patient presents for medical weight loss management.    Contraindications to GLP-1 receptor agonist therapy:   Denies personal or family history of MTC, personal history of MEN2, history of allergic reaction while taking a GLP-1 receptor agonist, and history of pancreatitis while taking a GLP-1 receptor agonist     Pregnancy Status:   - Pt denies current pregnancy, breastfeeding, or plans to become pregnant.  - Pt denies current use of oral hormonal contraception.     Co-morbidities: HTN    History of weight loss therapy:  Pt reports therapy with compounded tirzepatide and is currently taking Zepbound 10 mg.   Pt has been taking tirzepatide for about a year prior to [facility] and reports weight loss of about 100 lbs.     Tolerance to current therapy:  Denies nausea, vomiting, diarrhea, constipation, abdominal pain    Weight loss history:  Starting weight: 300 lbs - pt reported  Current weight:    7/18/2025   Recent Readings    Weight (lbs) 229 lb    BMI 39.3 BMI      % weight loss since GLP-1 initiation: 23.7 %    Objective  Lab Results   Component Value Date     07/06/2023     08/06/2019     06/07/2017     Lab Results   Component Value Date    K 4.3 07/06/2023    K 4.4 08/06/2019    K 4.1 06/07/2017     Lab Results   Component Value Date     07/06/2023     08/06/2019     06/07/2017     Lab Results   Component Value Date    CO2 23 07/06/2023    CO2 24 08/06/2019    CO2 23 06/07/2017     Lab Results   Component Value Date    BUN 14 07/06/2023    BUN 16 08/06/2019    BUN 18 06/07/2017     Lab Results   Component Value Date    GLU 90 07/06/2023    GLU 89 08/06/2019    GLU 82 06/07/2017     Lab Results   Component Value Date    CALCIUM 9.7 07/06/2023    CALCIUM 10.0 08/06/2019    CALCIUM 9.4 06/07/2017     Lab Results   Component Value Date    PROT 7.6 07/06/2023    PROT 7.8  08/06/2019    PROT 7.9 06/07/2017     Lab Results   Component Value Date    ALBUMIN 4.2 07/06/2023    ALBUMIN 4.3 08/06/2019    ALBUMIN 3.9 06/07/2017     Lab Results   Component Value Date    BILITOT 0.4 07/06/2023    BILITOT 0.3 08/06/2019    BILITOT 0.3 06/07/2017     Lab Results   Component Value Date    AST 16 07/06/2023    AST 21 08/06/2019    AST 18 06/07/2017     Lab Results   Component Value Date    ALT 15 07/06/2023    ALT 20 08/06/2019    ALT 19 06/07/2017     Lab Results   Component Value Date    ANIONGAP 10 07/06/2023    ANIONGAP 9 08/06/2019    ANIONGAP 10 06/07/2017     Lab Results   Component Value Date    CREATININE 0.8 07/06/2023    CREATININE 0.8 08/06/2019    CREATININE 0.8 06/07/2017     Lab Results   Component Value Date    EGFRNORACEVR >60 07/06/2023     Assessment/Plan    -Continue Zepbound 10 mg subq weekly.  - RTC in 6 months for follow-up evaluation    Patient consented to pharmacist management via collaborative practice.

## 2025-07-23 ENCOUNTER — PATIENT MESSAGE (OUTPATIENT)
Dept: INTERNAL MEDICINE | Facility: CLINIC | Age: 40
End: 2025-07-23

## 2025-07-23 ENCOUNTER — OFFICE VISIT (OUTPATIENT)
Dept: INTERNAL MEDICINE | Facility: CLINIC | Age: 40
End: 2025-07-23
Payer: COMMERCIAL

## 2025-07-23 DIAGNOSIS — E66.812 OBESITY, CLASS II, BMI 35-39.9: Primary | ICD-10-CM

## 2025-07-23 RX ORDER — TIRZEPATIDE 10 MG/.5ML
10 INJECTION, SOLUTION SUBCUTANEOUS
Qty: 2 ML | Refills: 5 | Status: ACTIVE | OUTPATIENT
Start: 2025-07-23

## 2025-07-24 NOTE — TELEPHONE ENCOUNTER
- Counseled patient on dietary and lifestyle modifications along continue compliance with CPAP therapy  -Continue to monitor   Spoke wit pt to reschedule missed injection. Both injections have been rescheduled. Pt voiced understanding. Pt was also asked if she could sign the papers she dropped off for school. Pt voiced understanding.

## 2025-08-15 ENCOUNTER — PATIENT MESSAGE (OUTPATIENT)
Dept: ADMINISTRATIVE | Facility: OTHER | Age: 40
End: 2025-08-15
Payer: COMMERCIAL

## 2025-08-21 ENCOUNTER — PATIENT OUTREACH (OUTPATIENT)
Dept: CARE COORDINATION | Facility: CLINIC | Age: 40
End: 2025-08-21
Payer: COMMERCIAL

## 2025-09-02 ENCOUNTER — OFFICE VISIT (OUTPATIENT)
Dept: FAMILY MEDICINE | Facility: CLINIC | Age: 40
End: 2025-09-02
Attending: FAMILY MEDICINE
Payer: COMMERCIAL

## 2025-09-02 VITALS
HEART RATE: 89 BPM | WEIGHT: 219.81 LBS | TEMPERATURE: 98 F | BODY MASS INDEX: 35.32 KG/M2 | SYSTOLIC BLOOD PRESSURE: 120 MMHG | DIASTOLIC BLOOD PRESSURE: 80 MMHG | OXYGEN SATURATION: 98 % | HEIGHT: 66 IN

## 2025-09-02 DIAGNOSIS — I10 PRIMARY HYPERTENSION: ICD-10-CM

## 2025-09-02 DIAGNOSIS — K76.0 NAFLD (NONALCOHOLIC FATTY LIVER DISEASE): ICD-10-CM

## 2025-09-02 DIAGNOSIS — F41.0 PANIC ATTACK: ICD-10-CM

## 2025-09-02 DIAGNOSIS — E66.01 SEVERE OBESITY (BMI 35.0-35.9 WITH COMORBIDITY): ICD-10-CM

## 2025-09-02 DIAGNOSIS — E66.01 MORBID OBESITY WITH BMI OF 40.0-44.9, ADULT: ICD-10-CM

## 2025-09-02 DIAGNOSIS — F41.1 GAD (GENERALIZED ANXIETY DISORDER): ICD-10-CM

## 2025-09-02 DIAGNOSIS — Z00.00 ROUTINE GENERAL MEDICAL EXAMINATION AT HEALTH CARE FACILITY: Primary | ICD-10-CM

## 2025-09-02 PROCEDURE — 99999 PR PBB SHADOW E&M-EST. PATIENT-LVL III: CPT | Mod: PBBFAC,,, | Performed by: FAMILY MEDICINE

## 2025-09-02 RX ORDER — AMLODIPINE BESYLATE 5 MG/1
5 TABLET ORAL DAILY
Qty: 90 TABLET | Refills: 5 | Status: SHIPPED | OUTPATIENT
Start: 2025-09-02

## 2025-09-02 RX ORDER — ALPRAZOLAM 0.25 MG/1
0.25 TABLET ORAL 2 TIMES DAILY PRN
Qty: 20 TABLET | Refills: 0 | Status: SHIPPED | OUTPATIENT
Start: 2025-09-02 | End: 2025-10-02

## 2025-09-02 RX ORDER — HYDROXYZINE HYDROCHLORIDE 25 MG/1
25 TABLET, FILM COATED ORAL 3 TIMES DAILY
Qty: 60 TABLET | Refills: 2 | Status: SHIPPED | OUTPATIENT
Start: 2025-09-02

## 2025-09-02 RX ORDER — ESCITALOPRAM OXALATE 10 MG/1
10 TABLET ORAL DAILY
Qty: 90 TABLET | Refills: 3 | Status: SHIPPED | OUTPATIENT
Start: 2025-09-02 | End: 2026-09-02

## 2025-09-04 ENCOUNTER — PATIENT OUTREACH (OUTPATIENT)
Dept: CARE COORDINATION | Facility: CLINIC | Age: 40
End: 2025-09-04
Payer: COMMERCIAL